# Patient Record
Sex: MALE | Race: WHITE | NOT HISPANIC OR LATINO | Employment: OTHER | URBAN - METROPOLITAN AREA
[De-identification: names, ages, dates, MRNs, and addresses within clinical notes are randomized per-mention and may not be internally consistent; named-entity substitution may affect disease eponyms.]

---

## 2018-04-05 ENCOUNTER — OFFICE VISIT (OUTPATIENT)
Dept: CARDIOLOGY | Facility: CLINIC | Age: 63
End: 2018-04-05

## 2018-04-05 VITALS
DIASTOLIC BLOOD PRESSURE: 81 MMHG | BODY MASS INDEX: 45.64 KG/M2 | HEART RATE: 73 BPM | SYSTOLIC BLOOD PRESSURE: 135 MMHG | HEIGHT: 66 IN | WEIGHT: 284 LBS

## 2018-04-05 DIAGNOSIS — R94.31 ABNORMAL EKG: ICD-10-CM

## 2018-04-05 DIAGNOSIS — I25.110 CORONARY ARTERY DISEASE INVOLVING NATIVE HEART WITH UNSTABLE ANGINA PECTORIS, UNSPECIFIED VESSEL OR LESION TYPE (HCC): ICD-10-CM

## 2018-04-05 DIAGNOSIS — R06.02 SOB (SHORTNESS OF BREATH): ICD-10-CM

## 2018-04-05 DIAGNOSIS — E66.09 CLASS 1 OBESITY DUE TO EXCESS CALORIES WITHOUT SERIOUS COMORBIDITY WITH BODY MASS INDEX (BMI) OF 30.0 TO 30.9 IN ADULT: Primary | ICD-10-CM

## 2018-04-05 PROCEDURE — 99203 OFFICE O/P NEW LOW 30 MIN: CPT | Performed by: INTERNAL MEDICINE

## 2018-04-05 PROCEDURE — 93000 ELECTROCARDIOGRAM COMPLETE: CPT | Performed by: INTERNAL MEDICINE

## 2018-04-05 RX ORDER — MONTELUKAST SODIUM 10 MG/1
10 TABLET ORAL NIGHTLY
COMMUNITY
Start: 2018-02-08

## 2018-04-05 RX ORDER — DULOXETIN HYDROCHLORIDE 60 MG/1
60 CAPSULE, DELAYED RELEASE ORAL 2 TIMES DAILY
COMMUNITY

## 2018-04-05 RX ORDER — DICLOFENAC SODIUM 75 MG/1
75 TABLET, DELAYED RELEASE ORAL 2 TIMES DAILY
COMMUNITY
Start: 2014-09-22 | End: 2020-02-29 | Stop reason: HOSPADM

## 2018-04-05 RX ORDER — HYDROCODONE BITARTRATE AND ACETAMINOPHEN 10; 325 MG/1; MG/1
1 TABLET ORAL EVERY 6 HOURS PRN
COMMUNITY

## 2018-04-05 RX ORDER — GABAPENTIN 300 MG/1
300 CAPSULE ORAL 3 TIMES DAILY
COMMUNITY
Start: 2018-03-14

## 2018-04-05 RX ORDER — OMEPRAZOLE 40 MG/1
40 CAPSULE, DELAYED RELEASE ORAL 2 TIMES DAILY
COMMUNITY
Start: 2017-11-26

## 2018-04-05 RX ORDER — ALFUZOSIN HYDROCHLORIDE 10 MG/1
10 TABLET, EXTENDED RELEASE ORAL DAILY
COMMUNITY
Start: 2018-03-27

## 2018-04-05 RX ORDER — LAMOTRIGINE 200 MG/1
200 TABLET ORAL DAILY
COMMUNITY
Start: 2018-03-27

## 2018-04-05 NOTE — PROGRESS NOTES
" Subjective:        CC  CP WITH DEEP BREATHING    SOB ON MINIMUM EXCERTION         Armando Dixon Jr. is a 62 y.o. male who Is here for the cardiac evaluation as the patient has been complaining of chest pains mild to moderate in intensity worse with a deep breathing     Patient also complaining of shortness of breath on minimum exertion     Armando Dixon Jr. has been complaining of the shortness of breath mild-to-moderate in intensity with mild-to-moderate usually relieved with rest associated with anxiety and fatigue  Cardiac risk factors: advanced age (older than 55 for men, 65 for women), male gender and obesity (BMI >= 30 kg/m2).    History reviewed. No pertinent past medical history. reports that he has never smoked. He has never used smokeless tobacco. He reports that he does not drink alcohol or use drugs.  Family History   Problem Relation Age of Onset   • Hypertension Mother    • Arrhythmia Mother    • Hyperlipidemia Neg Hx    • Heart disease Neg Hx    • Heart failure Neg Hx    • Heart attack Neg Hx         Review of Systems  Constitutional: No wt loss, fever   Gastrointestinal: No nausea , abdominal pain  Behavioral/Psych: No insomnia or anxiety   Cardiovascular ----positive for Chest pain and shortness of breath. All other systems reviewed and are negative    Objective:       Physical Exam             Physical Exam  /81   Pulse 73   Ht 167.6 cm (66\")   Wt 129 kg (284 lb)   BMI 45.84 kg/m²     General appearance: NAD, conversant   Eyes: anicteric sclerae, moist conjunctivae; no lid-lag; PERRLA   HENT: Atraumatic; oropharynx clear with moist mucous membranes and no mucosal ulcerations;  normal hard and soft palate   Neck: Trachea midline; FROM, supple, no thyromegaly or lymphadenopathy   Lungs: CTA, with normal respiratory effort and no intercostal retractions   CV: S1-S2 regular, no murmurs, no rub, no gallop   Abdomen: Soft, non-tender; no masses or HSM   Extremities: No peripheral edema " or extremity lymphadenopathy  Skin: Normal temperature, turgor and texture; no rash, ulcers or subcutaneous nodules   Psych: Appropriate affect, alert and oriented to person, place and time           Cardiographics  @  ECG 12 Lead  Date/Time: 4/5/2018 12:37 PM  Performed by: SABIHA BALDWIN  Authorized by: SABIHA BALDWIN   Comparison: compared with previous ECG   Similar to previous ECG  Rhythm: sinus rhythm  ST Flattening: all  Clinical impression: non-specific ECG        8/14  IMPRESSION:  1.  Normal left ventricular systolic function.   2.  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  His symptoms are more likely to be gastrointestinal.  I would not like   to stent this vessel until gastrointestinal causes are ruled out.    Echocardiogram:     Imaging  Chest x-ray: not done     Lab Review   not applicable       Current Outpatient Prescriptions:   •  diclofenac (VOLTAREN) 75 MG EC tablet, Take  by mouth., Disp: , Rfl:   •  omeprazole (priLOSEC) 40 MG capsule, Take 40 mg by mouth., Disp: , Rfl:   •  alfuzosin (UROXATRAL) 10 MG 24 hr tablet, , Disp: , Rfl:   •  DULoxetine (CYMBALTA) 60 MG capsule, Take 60 mg by mouth., Disp: , Rfl:   •  gabapentin (NEURONTIN) 300 MG capsule, , Disp: , Rfl:   •  HYDROcodone-acetaminophen (NORCO)  MG per tablet, Take 1 tablet by mouth., Disp: , Rfl:   •  lamoTRIgine (LaMICtal) 200 MG tablet, , Disp: , Rfl:   •  montelukast (SINGULAIR) 10 MG tablet, , Disp: , Rfl:         Assessment:      1. Class 1 obesity due to excess calories without serious comorbidity with body mass index (BMI) of 30.0 to 30.9 in adult    2. Coronary artery disease involving native heart with unstable angina pectoris, unspecified vessel or lesion type    3. SOB (shortness of breath)    4. Abnormal EKG        There is no problem list on file for this patient.        Plan:          1. Class  1 obesity due to excess calories without serious comorbidity with body mass index (BMI) of 30.0 to 30.9 in adult  Significant risk of obesity to CAD, HTN has been explained  Advantages of wt reduction has been explained    - Stress Test With Myocardial Perfusion One Day  - Adult Transthoracic Echo Complete W/ Cont if Necessary Per Protocol    2. Coronary artery disease involving native heart with unstable angina pectoris, unspecified vessel or lesion type  Armando PRINCESS Dixon Jr. with coronary artery disease has no angina pectoris    Risk reduction for the coronary artery disease, controlling the blood pressure, blood sugar management, cholesterol management, exercise, stress management, and proper compliance with medications and follow-up has been discussed    - Stress Test With Myocardial Perfusion One Day  - Adult Transthoracic Echo Complete W/ Cont if Necessary Per Protocol    3. SOB (shortness of breath)  Considering the patient's symptoms as well as clinical situation and  EKG findings, along with cardiac risk factors, ischemic workup is necessary to rule out ischemic cardiomyopathy, stress induced arrhythmias, and functional capacity for diagnosis as well as prognostic consideration    - Stress Test With Myocardial Perfusion One Day  - Adult Transthoracic Echo Complete W/ Cont if Necessary Per Protocol    4. Abnormal EKG  Considering patient's medical condition as well as the risk factors, patient will require echocardiogram for further evaluation for the LV function, four-chamber evaluation, including the pressures, valvular function and  pericardial disease and pericardial effusion    - Stress Test With Myocardial Perfusion One Day  - Adult Transthoracic Echo Complete W/ Cont if Necessary Per Protocol      Pros and cons of ASA in primary and secondary prevention of CAD has been discussed.  Risks of bleeding and other possible side effects have been discussed, Diff advantages and disadvantages of 325 vs 81  Mg  of ASA were discussed, at this stage it has been recommended to start ASA 81 mg /day       WALKING BELLE, ECHO    SEE US 2-4 WKS        Counseling was given to Armando Dixon Jr. for the following topics:  risk factor reductions, prognosis and risks and benefits of treatment options .       SMOKING COUNSELING:    Counseling given: Not Answered      .  EMR Dragon/Transcription disclaimer:   Much of this encounter note is an electronic transcription/translation of spoken language to printed text. The electronic translation of spoken language may permit erroneous, or at times, nonsensical words or phrases to be inadvertently transcribed; Although I have reviewed the note for such errors, some may still exist.

## 2018-04-30 ENCOUNTER — HOSPITAL ENCOUNTER (OUTPATIENT)
Dept: CARDIOLOGY | Facility: HOSPITAL | Age: 63
Discharge: HOME | End: 2018-04-30
Attending: INTERNAL MEDICINE | Admitting: INTERNAL MEDICINE

## 2018-04-30 ENCOUNTER — HOSPITAL ENCOUNTER (OUTPATIENT)
Dept: CARDIOLOGY | Facility: HOSPITAL | Age: 63
Discharge: HOME | End: 2018-04-30
Attending: INTERNAL MEDICINE

## 2018-04-30 ENCOUNTER — TELEPHONE (OUTPATIENT)
Dept: CARDIOLOGY | Facility: HOSPITAL | Age: 63
End: 2018-04-30

## 2018-04-30 VITALS
HEART RATE: 60 BPM | DIASTOLIC BLOOD PRESSURE: 81 MMHG | RESPIRATION RATE: 20 BRPM | HEIGHT: 66 IN | WEIGHT: 290 LBS | SYSTOLIC BLOOD PRESSURE: 159 MMHG | OXYGEN SATURATION: 96 % | BODY MASS INDEX: 46.61 KG/M2

## 2018-04-30 LAB
AORTIC DIMENSIONLESS INDEX: 0.8 (DI)
BH CV ECHO MEAS - ACS: 2.3 CM
BH CV ECHO MEAS - AO MAX PG (FULL): 5.5 MMHG
BH CV ECHO MEAS - AO MAX PG: 12.7 MMHG
BH CV ECHO MEAS - AO MEAN PG (FULL): 2 MMHG
BH CV ECHO MEAS - AO MEAN PG: 6 MMHG
BH CV ECHO MEAS - AO ROOT AREA (BSA CORRECTED): 1.5
BH CV ECHO MEAS - AO ROOT AREA: 9.1 CM^2
BH CV ECHO MEAS - AO ROOT DIAM: 3.4 CM
BH CV ECHO MEAS - AO V2 MAX: 178 CM/SEC
BH CV ECHO MEAS - AO V2 MEAN: 116 CM/SEC
BH CV ECHO MEAS - AO V2 VTI: 36.2 CM
BH CV ECHO MEAS - ASC AORTA: 2.8 CM
BH CV ECHO MEAS - AVA(I,A): 3.8 CM^2
BH CV ECHO MEAS - AVA(I,D): 3.8 CM^2
BH CV ECHO MEAS - AVA(V,A): 3.7 CM^2
BH CV ECHO MEAS - AVA(V,D): 3.7 CM^2
BH CV ECHO MEAS - BSA(HAYCOCK): 2.5 M^2
BH CV ECHO MEAS - BSA: 2.3 M^2
BH CV ECHO MEAS - BZI_BMI: 45.8 KILOGRAMS/M^2
BH CV ECHO MEAS - BZI_METRIC_HEIGHT: 167.6 CM
BH CV ECHO MEAS - BZI_METRIC_WEIGHT: 128.8 KG
BH CV ECHO MEAS - CONTRAST EF (2CH): 62.5 ML/M^2
BH CV ECHO MEAS - CONTRAST EF 4CH: 58.1 ML/M^2
BH CV ECHO MEAS - EDV(CUBED): 91.1 ML
BH CV ECHO MEAS - EDV(MOD-SP2): 192 ML
BH CV ECHO MEAS - EDV(MOD-SP4): 179 ML
BH CV ECHO MEAS - EDV(TEICH): 92.4 ML
BH CV ECHO MEAS - EF(CUBED): 64 %
BH CV ECHO MEAS - EF(MOD-BP): 61 %
BH CV ECHO MEAS - EF(MOD-SP2): 62 %
BH CV ECHO MEAS - EF(MOD-SP4): 58.1 %
BH CV ECHO MEAS - EF(TEICH): 55.7 %
BH CV ECHO MEAS - ESV(CUBED): 32.8 ML
BH CV ECHO MEAS - ESV(MOD-SP2): 72 ML
BH CV ECHO MEAS - ESV(MOD-SP4): 75 ML
BH CV ECHO MEAS - ESV(TEICH): 41 ML
BH CV ECHO MEAS - FS: 28.9 %
BH CV ECHO MEAS - IVS/LVPW: 1
BH CV ECHO MEAS - IVSD: 1.3 CM
BH CV ECHO MEAS - LAT PEAK E' VEL: 11 CM/SEC
BH CV ECHO MEAS - LV DIASTOLIC VOL/BSA (35-75): 77.1 ML/M^2
BH CV ECHO MEAS - LV MASS(C)D: 222.6 GRAMS
BH CV ECHO MEAS - LV MASS(C)DI: 95.9 GRAMS/M^2
BH CV ECHO MEAS - LV MAX PG: 7.2 MMHG
BH CV ECHO MEAS - LV MEAN PG: 4 MMHG
BH CV ECHO MEAS - LV SYSTOLIC VOL/BSA (12-30): 32.3 ML/M^2
BH CV ECHO MEAS - LV V1 MAX: 134 CM/SEC
BH CV ECHO MEAS - LV V1 MEAN: 87.4 CM/SEC
BH CV ECHO MEAS - LV V1 VTI: 28 CM
BH CV ECHO MEAS - LVIDD: 4.5 CM
BH CV ECHO MEAS - LVIDS: 3.2 CM
BH CV ECHO MEAS - LVLD AP2: 9.2 CM
BH CV ECHO MEAS - LVLD AP4: 9.4 CM
BH CV ECHO MEAS - LVLS AP2: 7.8 CM
BH CV ECHO MEAS - LVLS AP4: 8.1 CM
BH CV ECHO MEAS - LVOT AREA (M): 4.9 CM^2
BH CV ECHO MEAS - LVOT AREA: 4.9 CM^2
BH CV ECHO MEAS - LVOT DIAM: 2.5 CM
BH CV ECHO MEAS - LVPWD: 1.3 CM
BH CV ECHO MEAS - MED PEAK E' VEL: 10 CM/SEC
BH CV ECHO MEAS - MV A DUR: 0.13 SEC
BH CV ECHO MEAS - MV A MAX VEL: 85.9 CM/SEC
BH CV ECHO MEAS - MV DEC SLOPE: 503 CM/SEC^2
BH CV ECHO MEAS - MV DEC TIME: 0.15 SEC
BH CV ECHO MEAS - MV E MAX VEL: 90.7 CM/SEC
BH CV ECHO MEAS - MV E/A: 1.1
BH CV ECHO MEAS - MV MAX PG: 7.4 MMHG
BH CV ECHO MEAS - MV MEAN PG: 3 MMHG
BH CV ECHO MEAS - MV P1/2T MAX VEL: 128 CM/SEC
BH CV ECHO MEAS - MV P1/2T: 74.5 MSEC
BH CV ECHO MEAS - MV V2 MAX: 136 CM/SEC
BH CV ECHO MEAS - MV V2 MEAN: 80 CM/SEC
BH CV ECHO MEAS - MV V2 VTI: 36.3 CM
BH CV ECHO MEAS - MVA P1/2T LCG: 1.7 CM^2
BH CV ECHO MEAS - MVA(P1/2T): 3 CM^2
BH CV ECHO MEAS - MVA(VTI): 3.8 CM^2
BH CV ECHO MEAS - PA ACC TIME: 0.11 SEC
BH CV ECHO MEAS - PA MAX PG (FULL): 2.3 MMHG
BH CV ECHO MEAS - PA MAX PG: 4.4 MMHG
BH CV ECHO MEAS - PA PR(ACCEL): 29.1 MMHG
BH CV ECHO MEAS - PA V2 MAX: 105 CM/SEC
BH CV ECHO MEAS - PULM A REVS DUR: 0.12 SEC
BH CV ECHO MEAS - PULM A REVS VEL: 29.1 CM/SEC
BH CV ECHO MEAS - PULM DIAS VEL: 38.1 CM/SEC
BH CV ECHO MEAS - PULM S/D: 1.9
BH CV ECHO MEAS - PULM SYS VEL: 73.3 CM/SEC
BH CV ECHO MEAS - PVA(V,A): 2.2 CM^2
BH CV ECHO MEAS - PVA(V,D): 2.2 CM^2
BH CV ECHO MEAS - QP/QS: 0.4
BH CV ECHO MEAS - RV MAX PG: 2.1 MMHG
BH CV ECHO MEAS - RV MEAN PG: 1 MMHG
BH CV ECHO MEAS - RV V1 MAX: 72.8 CM/SEC
BH CV ECHO MEAS - RV V1 MEAN: 55.3 CM/SEC
BH CV ECHO MEAS - RV V1 VTI: 17.4 CM
BH CV ECHO MEAS - RVOT AREA: 3.1 CM^2
BH CV ECHO MEAS - RVOT DIAM: 2 CM
BH CV ECHO MEAS - SI(AO): 141.6 ML/M^2
BH CV ECHO MEAS - SI(CUBED): 25.1 ML/M^2
BH CV ECHO MEAS - SI(LVOT): 59.2 ML/M^2
BH CV ECHO MEAS - SI(MOD-SP2): 51.7 ML/M^2
BH CV ECHO MEAS - SI(MOD-SP4): 44.8 ML/M^2
BH CV ECHO MEAS - SI(TEICH): 22.2 ML/M^2
BH CV ECHO MEAS - SV(AO): 328.7 ML
BH CV ECHO MEAS - SV(CUBED): 58.4 ML
BH CV ECHO MEAS - SV(LVOT): 137.4 ML
BH CV ECHO MEAS - SV(MOD-SP2): 120 ML
BH CV ECHO MEAS - SV(MOD-SP4): 104 ML
BH CV ECHO MEAS - SV(RVOT): 54.7 ML
BH CV ECHO MEAS - SV(TEICH): 51.5 ML
BH CV ECHO MEAS - TAPSE (>1.6): 3.2 CM2
BH CV ECHO MEASUREMENTS AVERAGE E/E' RATIO: 8.64
BH CV STRESS BP STAGE 1: NORMAL
BH CV STRESS COMMENTS STAGE 1: NORMAL
BH CV STRESS DOSE REGADENOSON STAGE 1: 0.4
BH CV STRESS DURATION MIN STAGE 1: 2
BH CV STRESS DURATION SEC STAGE 1: 37
BH CV STRESS GRADE STAGE 1: 0
BH CV STRESS HR STAGE 1: 111
BH CV STRESS METS STAGE 1: 1.4
BH CV STRESS PROTOCOL 1: NORMAL
BH CV STRESS RECOVERY BP: NORMAL MMHG
BH CV STRESS RECOVERY HR: 75 BPM
BH CV STRESS RECOVERY O2: 96 %
BH CV STRESS SPEED STAGE 1: 1
BH CV STRESS STAGE 1: 1
BH CV VAS BP LEFT ARM: NORMAL MMHG
BH CV XLRA - RV BASE: 3.7 CM
BH CV XLRA - TDI S': 16 CM/SEC
LEFT ATRIUM VOLUME INDEX: 30.8 ML/M2
LV EF NUC BP: 60 %
MAXIMAL PREDICTED HEART RATE: 159 BPM
MAXIMAL PREDICTED HEART RATE: 159 BPM
PERCENT MAX PREDICTED HR: 69.81 %
STRESS BASELINE BP: NORMAL MMHG
STRESS BASELINE HR: 62 BPM
STRESS O2 SAT REST: 96 %
STRESS PERCENT HR: 82 %
STRESS POST ESTIMATED WORKLOAD: 1.4 METS
STRESS POST EXERCISE DUR MIN: 2 MIN
STRESS POST EXERCISE DUR SEC: 37 SEC
STRESS POST PEAK BP: NORMAL MMHG
STRESS POST PEAK HR: 111 BPM
STRESS TARGET HR: 135 BPM
STRESS TARGET HR: 135 BPM

## 2018-04-30 PROCEDURE — 93306 TTE W/DOPPLER COMPLETE: CPT

## 2018-04-30 PROCEDURE — 93017 CV STRESS TEST TRACING ONLY: CPT

## 2018-04-30 PROCEDURE — A9500 TC99M SESTAMIBI: HCPCS | Performed by: INTERNAL MEDICINE

## 2018-04-30 PROCEDURE — 93306 TTE W/DOPPLER COMPLETE: CPT | Performed by: INTERNAL MEDICINE

## 2018-04-30 PROCEDURE — 25010000002 PERFLUTREN (DEFINITY) 8.476 MG IN SODIUM CHLORIDE 0.9 % 10 ML INJECTION: Performed by: INTERNAL MEDICINE

## 2018-04-30 PROCEDURE — 78452 HT MUSCLE IMAGE SPECT MULT: CPT | Performed by: INTERNAL MEDICINE

## 2018-04-30 PROCEDURE — 93016 CV STRESS TEST SUPVJ ONLY: CPT | Performed by: INTERNAL MEDICINE

## 2018-04-30 PROCEDURE — 93018 CV STRESS TEST I&R ONLY: CPT | Performed by: INTERNAL MEDICINE

## 2018-04-30 PROCEDURE — 25010000002 REGADENOSON 0.4 MG/5ML SOLUTION: Performed by: INTERNAL MEDICINE

## 2018-04-30 PROCEDURE — 0 TECHNETIUM SESTAMIBI: Performed by: INTERNAL MEDICINE

## 2018-04-30 PROCEDURE — 78452 HT MUSCLE IMAGE SPECT MULT: CPT

## 2018-04-30 RX ADMIN — REGADENOSON 0.4 MG: 0.08 INJECTION, SOLUTION INTRAVENOUS at 09:10

## 2018-04-30 RX ADMIN — TECHNETIUM TC 99M SESTAMIBI 1 DOSE: 1 INJECTION INTRAVENOUS at 07:23

## 2018-04-30 RX ADMIN — TECHNETIUM TC 99M SESTAMIBI 1 DOSE: 1 INJECTION INTRAVENOUS at 09:10

## 2018-04-30 RX ADMIN — SODIUM CHLORIDE 7 ML: 9 INJECTION INTRAMUSCULAR; INTRAVENOUS; SUBCUTANEOUS at 07:49

## 2018-06-07 ENCOUNTER — OFFICE VISIT (OUTPATIENT)
Dept: CARDIOLOGY | Facility: CLINIC | Age: 63
End: 2018-06-07

## 2018-06-07 ENCOUNTER — TELEPHONE (OUTPATIENT)
Dept: CARDIOLOGY | Facility: CLINIC | Age: 63
End: 2018-06-07

## 2018-06-07 VITALS
DIASTOLIC BLOOD PRESSURE: 84 MMHG | HEIGHT: 66 IN | HEART RATE: 71 BPM | BODY MASS INDEX: 47.09 KG/M2 | SYSTOLIC BLOOD PRESSURE: 164 MMHG | WEIGHT: 293 LBS

## 2018-06-07 DIAGNOSIS — Z53.21 PATIENT LEFT WITHOUT BEING SEEN: Primary | ICD-10-CM

## 2018-06-07 RX ORDER — TRIAMTERENE AND HYDROCHLOROTHIAZIDE 37.5; 25 MG/1; MG/1
1 CAPSULE ORAL EVERY MORNING
Qty: 30 CAPSULE | Refills: 0 | Status: SHIPPED | OUTPATIENT
Start: 2018-06-07 | End: 2018-07-09 | Stop reason: SDUPTHER

## 2018-06-07 NOTE — TELEPHONE ENCOUNTER
Mr Dixon has been having swelling in his legs and feet. Per Dr ROJAS start on dyazide and return in one month.    Mr Dixon verbalized understanding and states will call back sooner if no improvement.

## 2018-07-05 ENCOUNTER — OFFICE VISIT (OUTPATIENT)
Dept: CARDIOLOGY | Facility: CLINIC | Age: 63
End: 2018-07-05

## 2018-07-05 VITALS
SYSTOLIC BLOOD PRESSURE: 127 MMHG | DIASTOLIC BLOOD PRESSURE: 73 MMHG | WEIGHT: 284 LBS | HEART RATE: 77 BPM | BODY MASS INDEX: 45.64 KG/M2 | HEIGHT: 66 IN

## 2018-07-05 DIAGNOSIS — E66.09 CLASS 1 OBESITY DUE TO EXCESS CALORIES WITHOUT SERIOUS COMORBIDITY WITH BODY MASS INDEX (BMI) OF 30.0 TO 30.9 IN ADULT: ICD-10-CM

## 2018-07-05 DIAGNOSIS — I10 ESSENTIAL HYPERTENSION: Primary | ICD-10-CM

## 2018-07-05 PROBLEM — E66.811 CLASS 1 OBESITY DUE TO EXCESS CALORIES WITHOUT SERIOUS COMORBIDITY WITH BODY MASS INDEX (BMI) OF 30.0 TO 30.9 IN ADULT: Status: ACTIVE | Noted: 2018-07-05

## 2018-07-05 PROCEDURE — 99213 OFFICE O/P EST LOW 20 MIN: CPT | Performed by: INTERNAL MEDICINE

## 2018-07-05 NOTE — PROGRESS NOTES
" Subjective:       Armando Dixon Jr. is a 63 y.o. male who here for follow up    CC  Follow-up for the hypertension and obesity   HPI  63-year-old male with known history of the benign essential arterial hypertension and obesity here for the follow-up after the stress test and echocardiogram which are normal     Problem List Items Addressed This Visit        Cardiovascular and Mediastinum    Essential hypertension - Primary       Digestive    Class 1 obesity due to excess calories without serious comorbidity with body mass index (BMI) of 30.0 to 30.9 in adult        .    The following portions of the patient's history were reviewed and updated as appropriate: allergies, current medications, past family history, past medical history, past social history, past surgical history and problem list.    Past Medical History:   Diagnosis Date   • Chronic kidney disease     chronic stones  has had 4 eposodes   • Hypertension    • ENRRIQUE on CPAP    • Seasonal allergies     reports that he has never smoked. He has never used smokeless tobacco. He reports that he does not drink alcohol or use drugs.  Family History   Problem Relation Age of Onset   • Hypertension Mother    • Arrhythmia Mother    • Hyperlipidemia Neg Hx    • Heart disease Neg Hx    • Heart failure Neg Hx    • Heart attack Neg Hx        Review of Systems  Constitutional: No wt loss, fever, fatigue  Gastrointestinal: No nausea, abdominal pain  Behavioral/Psych: No insomnia or anxiety   Cardiovascular No chest pains or tightness in chest  Objective:               Physical Exam  /73 (BP Location: Left arm, Patient Position: Sitting)   Pulse 77   Ht 167.6 cm (66\")   Wt 129 kg (284 lb)   BMI 45.84 kg/m²     General appearance: NAD, conversant   Eyes: anicteric sclerae, moist conjunctivae; no lid-lag; PERRLA   HENT: Atraumatic; oropharynx clear with moist mucous membranes and no mucosal ulcerations;  normal hard and soft palate   Neck: Trachea midline; FROM, " supple, no thyromegaly or lymphadenopathy   Lungs: CTA, with normal respiratory effort and no intercostal retractions   CV: S1-S2 regular, no murmurs, no rub, no gallop   Abdomen: Soft, non-tender; no masses or HSM   Extremities: No peripheral edema or extremity lymphadenopathy  Skin: Normal temperature, turgor and texture; no rash, ulcers or subcutaneous nodules   Psych: Appropriate affect, alert and oriented to person, place and time           Cardiographics  @Procedures    Echocardiogram:        Current Outpatient Prescriptions:   •  alfuzosin (UROXATRAL) 10 MG 24 hr tablet, , Disp: , Rfl:   •  diclofenac (VOLTAREN) 75 MG EC tablet, Take  by mouth., Disp: , Rfl:   •  DULoxetine (CYMBALTA) 60 MG capsule, Take 60 mg by mouth., Disp: , Rfl:   •  gabapentin (NEURONTIN) 300 MG capsule, , Disp: , Rfl:   •  HYDROcodone-acetaminophen (NORCO)  MG per tablet, Take 1 tablet by mouth., Disp: , Rfl:   •  lamoTRIgine (LaMICtal) 200 MG tablet, , Disp: , Rfl:   •  montelukast (SINGULAIR) 10 MG tablet, , Disp: , Rfl:   •  omeprazole (priLOSEC) 40 MG capsule, Take 40 mg by mouth., Disp: , Rfl:   •  triamterene-hydrochlorothiazide (DYAZIDE) 37.5-25 MG per capsule, Take 1 capsule by mouth Every Morning., Disp: 30 capsule, Rfl: 0   Assessment:        There is no problem list on file for this patient.        Interpretation Summary        · Findings consistent with an equivocal ECG stress test.  · Left ventricular ejection fraction is normal (Calculated EF = 60%).  · Myocardial perfusion imaging indicates a normal myocardial perfusion study with no evidence of ischemia.  · Impressions are consistent with a low risk study     Interpretation Summary     · Left atrial cavity size is mildly dilated.  · Calculated EF = 61%.  · There is no evidence of pericardial effusion.              Plan:            ICD-10-CM ICD-9-CM   1. Essential hypertension I10 401.9   2. Class 1 obesity due to excess calories without serious comorbidity with  body mass index (BMI) of 30.0 to 30.9 in adult E66.09 278.00    Z68.30 V85.30     1. Essential hypertension  Blood pressure under control    2. Class 1 obesity due to excess calories without serious comorbidity with body mass index (BMI) of 30.0 to 30.9 in adult  Significant risk of obesity to CAD, HTN has been explained  Advantages of wt reduction has been explained         Specificity and sensitivity of the stress test/ cardiac workup has been explained. Pt has been explained if  Symptoms continue please go to ER, and further w/p will be required.    Also explained this does not rule out coronary artery disease or the future events, continue to emphasize on risk reductions for coronary artery disease    bp better with dyazide  See in 1 yr  COUNSELING:    Armando Dixon Jr.Counseling was given to patient for the following topics: diagnostic results, risk factor reductions, impressions, risks and benefits of treatment options and importance of treatment compliance .       SMOKING COUNSELING:    Counseling given: Not Answered      EMR Dragon/Transcription disclaimer:   Much of this encounter note is an electronic transcription/translation of spoken language to printed text. The electronic translation of spoken language may permit erroneous, or at times, nonsensical words or phrases to be inadvertently transcribed; Although I have reviewed the note for such errors, some may still exist.

## 2018-07-10 RX ORDER — TRIAMTERENE AND HYDROCHLOROTHIAZIDE 37.5; 25 MG/1; MG/1
CAPSULE ORAL
Qty: 30 CAPSULE | Refills: 1 | Status: SHIPPED | OUTPATIENT
Start: 2018-07-10 | End: 2018-09-17 | Stop reason: SDUPTHER

## 2018-09-17 RX ORDER — TRIAMTERENE AND HYDROCHLOROTHIAZIDE 37.5; 25 MG/1; MG/1
CAPSULE ORAL
Qty: 30 CAPSULE | Refills: 0 | Status: SHIPPED | OUTPATIENT
Start: 2018-09-17 | End: 2018-10-22 | Stop reason: SDUPTHER

## 2018-10-23 RX ORDER — TRIAMTERENE AND HYDROCHLOROTHIAZIDE 37.5; 25 MG/1; MG/1
CAPSULE ORAL
Qty: 30 CAPSULE | Refills: 3 | Status: SHIPPED | OUTPATIENT
Start: 2018-10-23 | End: 2019-04-08 | Stop reason: SDUPTHER

## 2019-04-08 RX ORDER — TRIAMTERENE AND HYDROCHLOROTHIAZIDE 37.5; 25 MG/1; MG/1
CAPSULE ORAL
Qty: 30 CAPSULE | Refills: 2 | Status: SHIPPED | OUTPATIENT
Start: 2019-04-08 | End: 2019-07-26 | Stop reason: SDUPTHER

## 2019-07-26 RX ORDER — TRIAMTERENE AND HYDROCHLOROTHIAZIDE 37.5; 25 MG/1; MG/1
CAPSULE ORAL
Qty: 30 CAPSULE | Refills: 0 | Status: SHIPPED | OUTPATIENT
Start: 2019-07-26 | End: 2019-08-27 | Stop reason: SDUPTHER

## 2019-08-28 RX ORDER — TRIAMTERENE AND HYDROCHLOROTHIAZIDE 37.5; 25 MG/1; MG/1
CAPSULE ORAL
Qty: 30 CAPSULE | Refills: 0 | Status: SHIPPED | OUTPATIENT
Start: 2019-08-28 | End: 2019-10-15 | Stop reason: SDUPTHER

## 2019-10-15 RX ORDER — TRIAMTERENE AND HYDROCHLOROTHIAZIDE 37.5; 25 MG/1; MG/1
CAPSULE ORAL
Qty: 30 CAPSULE | Refills: 0 | Status: SHIPPED | OUTPATIENT
Start: 2019-10-15 | End: 2019-12-10 | Stop reason: SDUPTHER

## 2019-11-20 RX ORDER — TRIAMTERENE AND HYDROCHLOROTHIAZIDE 37.5; 25 MG/1; MG/1
CAPSULE ORAL
Qty: 30 CAPSULE | Refills: 0 | OUTPATIENT
Start: 2019-11-20

## 2019-12-09 RX ORDER — TRIAMTERENE AND HYDROCHLOROTHIAZIDE 37.5; 25 MG/1; MG/1
CAPSULE ORAL
Qty: 30 CAPSULE | Refills: 0 | OUTPATIENT
Start: 2019-12-09

## 2019-12-10 RX ORDER — TRIAMTERENE AND HYDROCHLOROTHIAZIDE 37.5; 25 MG/1; MG/1
1 CAPSULE ORAL EVERY MORNING
Qty: 30 CAPSULE | Refills: 1 | Status: SHIPPED | OUTPATIENT
Start: 2019-12-10 | End: 2020-02-26

## 2019-12-24 ENCOUNTER — OFFICE VISIT (OUTPATIENT)
Dept: CARDIOLOGY | Facility: CLINIC | Age: 64
End: 2019-12-24

## 2019-12-24 VITALS
WEIGHT: 256 LBS | HEIGHT: 66 IN | HEART RATE: 67 BPM | SYSTOLIC BLOOD PRESSURE: 135 MMHG | DIASTOLIC BLOOD PRESSURE: 79 MMHG | BODY MASS INDEX: 41.14 KG/M2

## 2019-12-24 DIAGNOSIS — E78.2 MIXED HYPERLIPIDEMIA: ICD-10-CM

## 2019-12-24 DIAGNOSIS — E66.09 CLASS 1 OBESITY DUE TO EXCESS CALORIES WITHOUT SERIOUS COMORBIDITY WITH BODY MASS INDEX (BMI) OF 30.0 TO 30.9 IN ADULT: ICD-10-CM

## 2019-12-24 DIAGNOSIS — I10 ESSENTIAL HYPERTENSION: Primary | ICD-10-CM

## 2019-12-24 PROCEDURE — 93000 ELECTROCARDIOGRAM COMPLETE: CPT | Performed by: NURSE PRACTITIONER

## 2019-12-24 PROCEDURE — 99214 OFFICE O/P EST MOD 30 MIN: CPT | Performed by: NURSE PRACTITIONER

## 2019-12-24 NOTE — PROGRESS NOTES
Patient Name: Armando Dixon Jr.  :1955  Age: 64 y.o.  Primary Cardiologist: Minerva Adams MD  Encounter Provider:  EDU Spence      Chief Complaint:   Chief Complaint   Patient presents with   • Hypertension     1 YR FOLLOW UP         HPI this 64-year-old male, new to this provider, comes today in annual follow-up with current diagnoses to include hypertension, obesity with BMI of 45.8, ENRRIQUE with CPAP therapy, and chronic kidney disease.  Last echo 2018 revealed mild elation of LAC, EF 61% with no evidence of pericardial effusion.  Stress testing done at the same time revealed no evidence of myocardial ischemia.  Prior cardiac catheterization done 2014 revealed moderate stenosis in the proximal part of diagonal artery and symptomatology at that time were thought to be GI in nature.  Last lipid panel 2019 revealed triglycerides 168  HDL 53 and LDL 94 with AST and ALT within normal limits at that time.  ECG in office today reveals sinus rhythm, heart rate 60s.    Patient Active Problem List   Diagnosis   • Essential hypertension   • Class 1 obesity due to excess calories without serious comorbidity with body mass index (BMI) of 30.0 to 30.9 in adult   • Mixed hyperlipidemia           The following portions of the patient's history were reviewed and updated as appropriate: allergies, current medications, past family history, past medical history, past social history, past surgical history and problem list.    Current Outpatient Medications on File Prior to Visit   Medication Sig Dispense Refill   • alfuzosin (UROXATRAL) 10 MG 24 hr tablet      • diclofenac (VOLTAREN) 75 MG EC tablet Take  by mouth.     • DULoxetine (CYMBALTA) 60 MG capsule Take 60 mg by mouth.     • gabapentin (NEURONTIN) 300 MG capsule      • HYDROcodone-acetaminophen (NORCO)  MG per tablet Take 1 tablet by mouth.     • lamoTRIgine (LaMICtal) 200 MG tablet      • montelukast  (SINGULAIR) 10 MG tablet      • omeprazole (priLOSEC) 40 MG capsule Take 40 mg by mouth.     • triamterene-hydrochlorothiazide (DYAZIDE) 37.5-25 MG per capsule Take 1 capsule by mouth Every Morning. 30 capsule 1     No current facility-administered medications on file prior to visit.        Past Medical History:   Diagnosis Date   • Chronic kidney disease     chronic stones  has had 4 eposodes   • Hypertension    • ENRRIQUE on CPAP    • Seasonal allergies        Past Surgical History:   Procedure Laterality Date   • CARPAL TUNNEL RELEASE Bilateral    • CHOLECYSTECTOMY     • COLONOSCOPY  10/06/2014    [facility]   • ENDOSCOPY  08/29/2014    Severe esophagitis, gastritis, neg for H pylori, Walters's, and fungi   • GASTRIC BYPASS     • TOTAL HIP ARTHROPLASTY         Family History   Problem Relation Age of Onset   • Hypertension Mother    • Arrhythmia Mother    • Hyperlipidemia Neg Hx    • Heart disease Neg Hx    • Heart failure Neg Hx    • Heart attack Neg Hx        Social History     Socioeconomic History   • Marital status:      Spouse name: Not on file   • Number of children: Not on file   • Years of education: Not on file   • Highest education level: Not on file   Tobacco Use   • Smoking status: Never Smoker   • Smokeless tobacco: Never Used   Substance and Sexual Activity   • Alcohol use: Yes     Comment: RARELY   • Drug use: No   • Sexual activity: Defer       Review of Systems   Constitution: Negative for diaphoresis and malaise/fatigue.   HENT: Negative for nosebleeds and stridor.    Cardiovascular: Negative for chest pain, claudication, dyspnea on exertion, irregular heartbeat, leg swelling, near-syncope, orthopnea, palpitations, paroxysmal nocturnal dyspnea and syncope.   Respiratory: Negative for cough, hemoptysis, shortness of breath and wheezing.    Gastrointestinal: Negative for abdominal pain, constipation, diarrhea, hematemesis, hematochezia, melena, nausea and vomiting.   Neurological: Negative for  "dizziness, focal weakness, light-headedness and weakness.       OBJECTIVE:   Vital Signs  Vitals:    12/24/19 1010   BP: 135/79   Pulse: 67     Estimated body mass index is 41.32 kg/m² as calculated from the following:    Height as of this encounter: 167.6 cm (66\").    Weight as of this encounter: 116 kg (256 lb).    Physical Exam   Constitutional: He is oriented to person, place, and time. He appears well-developed and well-nourished. No distress.   HENT:   Head: Normocephalic and atraumatic.   Eyes: Pupils are equal, round, and reactive to light. Conjunctivae and EOM are normal.   Neck: Normal range of motion. Neck supple. No JVD present. No tracheal deviation present. No thyromegaly present.   Cardiovascular: Normal rate, regular rhythm, normal heart sounds and intact distal pulses. Exam reveals no gallop and no friction rub.   No murmur heard.  Pulmonary/Chest: Effort normal and breath sounds normal. No respiratory distress. He has no wheezes. He has no rales. He exhibits no tenderness.   Abdominal: Soft. Bowel sounds are normal. He exhibits no distension. There is no tenderness.   Musculoskeletal: Normal range of motion. He exhibits no edema, tenderness or deformity.   Neurological: He is alert and oriented to person, place, and time.   Skin: Skin is warm and dry. No rash noted. He is not diaphoretic. No erythema. No pallor.   Psychiatric: He has a normal mood and affect. His behavior is normal.         ECG 12 Lead  Date/Time: 12/24/2019 10:25 AM  Performed by: Kimberly Vela APRN  Authorized by: Kimberly Vela APRN   Comparison: compared with previous ECG from 4/6/2018  Similar to previous ECG  Rhythm: sinus rhythm  Rate: normal  Conduction: conduction normal  ST Segments: ST segments normal  T Waves: T waves normal  QRS axis: normal    Clinical impression: normal ECG            Cardiac Cath:  8/26/14  IMPRESSION:  1.  Normal left ventricular systolic function.   2.  Moderate stenosis in the proximal " part of the diagonal branch artery  originating  at the mid-trifurcation of the mid-left anterior descending  artery.  His symptoms are more likely to be gastrointestinal.  I would not like   to stent this vessel until gastrointestinal causes are ruled out.    Stress Testin18  Interpretation Summary        · Findings consistent with an equivocal ECG stress test.  · Left ventricular ejection fraction is normal (Calculated EF = 60%).  · Myocardial perfusion imaging indicates a normal myocardial perfusion study with no evidence of ischemia.  · Impressions are consistent with a low risk study.     Asymptomatic for chest pain. Specificity of study reduced secondary to baseline Non-specific ST-T wave abnormalities, however ECG is not suggestive of ischemia  Ectopy: none.  Pharmacologic study due to inability to tolerate increasing speed and grade of treadmill due to orthopedic and  mobility  issues.  Participated in Low Level exercise and tolerance is fair.          Cardiac Echo:  18  Interpretation Summary     · Left atrial cavity size is mildly dilated.  · Calculated EF = 61%.  · There is no evidence of pericardial effusion.           ASSESSMENT:      Diagnosis Plan   1. Essential hypertension  ECG 12 Lead    Lipid Panel    Comprehensive Metabolic Panel   2. Class 1 obesity due to excess calories without serious comorbidity with body mass index (BMI) of 30.0 to 30.9 in adult     3. Mixed hyperlipidemia           PLAN OF CARE:     1.  Hypertension-blood pressure in office today is currently stable and well controlled.  Patient currently on Dyazide.  Last echo and ischemic work-up as above.  Continue current medication regimen    Importance of controlling hypertension and blood pressure  checkup on the regular basis has been explained  Hypertension as a silent killer has been discussed  Risk reduction of the weight and regular exercises to control the hypertension has been explained      2.   Hyperlipidemia-currently controlled with diet.  Continue to monitor annually.    3.  ENRRIQUE-compliant with CPAP therapy.  Continue.  Patient follows with pulmonary.  ECG in office today reveals sinus rhythm, rate normal.    4.  Obesity-BMI 45.8.  The importance of healthy diet and exercise at least 30 minutes daily reviewed with the patient in detail.  He has been following weight watchers and has lost 50 pounds.  He is encouraged to continue up this excellent work.        Follow-up in 1 year sooner if needed with FLP and CMP    Sincerely,   EDU Spence  [facility]  12/24/19  10:37 AM

## 2020-01-24 PROCEDURE — 93005 ELECTROCARDIOGRAM TRACING: CPT | Performed by: EMERGENCY MEDICINE

## 2020-01-24 PROCEDURE — 99284 EMERGENCY DEPT VISIT MOD MDM: CPT

## 2020-01-24 PROCEDURE — 93005 ELECTROCARDIOGRAM TRACING: CPT

## 2020-01-24 PROCEDURE — 93010 ELECTROCARDIOGRAM REPORT: CPT | Performed by: INTERNAL MEDICINE

## 2020-01-24 RX ORDER — ASPIRIN 325 MG
325 TABLET ORAL ONCE
Status: DISCONTINUED | OUTPATIENT
Start: 2020-01-24 | End: 2020-01-25

## 2020-01-24 RX ORDER — SODIUM CHLORIDE 0.9 % (FLUSH) 0.9 %
10 SYRINGE (ML) INJECTION AS NEEDED
Status: DISCONTINUED | OUTPATIENT
Start: 2020-01-24 | End: 2020-01-25 | Stop reason: HOSPADM

## 2020-01-25 ENCOUNTER — HOSPITAL ENCOUNTER (EMERGENCY)
Facility: HOSPITAL | Age: 65
Discharge: HOME | End: 2020-01-25
Attending: EMERGENCY MEDICINE | Admitting: EMERGENCY MEDICINE

## 2020-01-25 ENCOUNTER — APPOINTMENT (OUTPATIENT)
Dept: GENERAL RADIOLOGY | Facility: HOSPITAL | Age: 65
End: 2020-01-25

## 2020-01-25 VITALS
BODY MASS INDEX: 42.75 KG/M2 | DIASTOLIC BLOOD PRESSURE: 78 MMHG | RESPIRATION RATE: 16 BRPM | TEMPERATURE: 97.2 F | HEART RATE: 62 BPM | SYSTOLIC BLOOD PRESSURE: 137 MMHG | HEIGHT: 66 IN | WEIGHT: 266 LBS | OXYGEN SATURATION: 98 %

## 2020-01-25 DIAGNOSIS — R07.9 CHEST PAIN, UNSPECIFIED TYPE: Primary | ICD-10-CM

## 2020-01-25 LAB
ALBUMIN SERPL-MCNC: 3.9 G/DL (ref 3.5–5.2)
ALBUMIN/GLOB SERPL: 1.6 G/DL
ALP SERPL-CCNC: 104 U/L (ref 39–117)
ALT SERPL W P-5'-P-CCNC: 13 U/L (ref 1–41)
ANION GAP SERPL CALCULATED.3IONS-SCNC: 8.8 MMOL/L (ref 5–15)
AST SERPL-CCNC: 16 U/L (ref 1–40)
BASOPHILS # BLD AUTO: 0.02 10*3/MM3 (ref 0–0.2)
BASOPHILS NFR BLD AUTO: 0.4 % (ref 0–1.5)
BILIRUB SERPL-MCNC: 0.2 MG/DL (ref 0.2–1.2)
BUN BLD-MCNC: 18 MG/DL (ref 8–23)
BUN/CREAT SERPL: 19.8 (ref 7–25)
CALCIUM SPEC-SCNC: 9.1 MG/DL (ref 8.6–10.5)
CHLORIDE SERPL-SCNC: 105 MMOL/L (ref 98–107)
CO2 SERPL-SCNC: 25.2 MMOL/L (ref 22–29)
CREAT BLD-MCNC: 0.91 MG/DL (ref 0.76–1.27)
DEPRECATED RDW RBC AUTO: 40.8 FL (ref 37–54)
EOSINOPHIL # BLD AUTO: 0.13 10*3/MM3 (ref 0–0.4)
EOSINOPHIL NFR BLD AUTO: 2.4 % (ref 0.3–6.2)
ERYTHROCYTE [DISTWIDTH] IN BLOOD BY AUTOMATED COUNT: 12.9 % (ref 12.3–15.4)
GFR SERPL CREATININE-BSD FRML MDRD: 84 ML/MIN/1.73
GLOBULIN UR ELPH-MCNC: 2.5 GM/DL
GLUCOSE BLD-MCNC: 104 MG/DL (ref 65–99)
HCT VFR BLD AUTO: 39 % (ref 37.5–51)
HGB BLD-MCNC: 12.6 G/DL (ref 13–17.7)
HOLD SPECIMEN: NORMAL
HOLD SPECIMEN: NORMAL
IMM GRANULOCYTES # BLD AUTO: 0.01 10*3/MM3 (ref 0–0.05)
IMM GRANULOCYTES NFR BLD AUTO: 0.2 % (ref 0–0.5)
LYMPHOCYTES # BLD AUTO: 1.51 10*3/MM3 (ref 0.7–3.1)
LYMPHOCYTES NFR BLD AUTO: 27.7 % (ref 19.6–45.3)
MCH RBC QN AUTO: 28.3 PG (ref 26.6–33)
MCHC RBC AUTO-ENTMCNC: 32.3 G/DL (ref 31.5–35.7)
MCV RBC AUTO: 87.6 FL (ref 79–97)
MONOCYTES # BLD AUTO: 0.58 10*3/MM3 (ref 0.1–0.9)
MONOCYTES NFR BLD AUTO: 10.6 % (ref 5–12)
NEUTROPHILS # BLD AUTO: 3.21 10*3/MM3 (ref 1.7–7)
NEUTROPHILS NFR BLD AUTO: 58.7 % (ref 42.7–76)
NRBC BLD AUTO-RTO: 0 /100 WBC (ref 0–0.2)
PLATELET # BLD AUTO: 214 10*3/MM3 (ref 140–450)
PMV BLD AUTO: 9.9 FL (ref 6–12)
POTASSIUM BLD-SCNC: 4.5 MMOL/L (ref 3.5–5.2)
PROT SERPL-MCNC: 6.4 G/DL (ref 6–8.5)
RBC # BLD AUTO: 4.45 10*6/MM3 (ref 4.14–5.8)
SODIUM BLD-SCNC: 139 MMOL/L (ref 136–145)
TROPONIN T SERPL-MCNC: <0.01 NG/ML (ref 0–0.03)
WBC NRBC COR # BLD: 5.46 10*3/MM3 (ref 3.4–10.8)
WHOLE BLOOD HOLD SPECIMEN: NORMAL
WHOLE BLOOD HOLD SPECIMEN: NORMAL

## 2020-01-25 PROCEDURE — 84484 ASSAY OF TROPONIN QUANT: CPT

## 2020-01-25 PROCEDURE — 84484 ASSAY OF TROPONIN QUANT: CPT | Performed by: PHYSICIAN ASSISTANT

## 2020-01-25 PROCEDURE — 85025 COMPLETE CBC W/AUTO DIFF WBC: CPT

## 2020-01-25 PROCEDURE — 80053 COMPREHEN METABOLIC PANEL: CPT

## 2020-01-25 PROCEDURE — 71046 X-RAY EXAM CHEST 2 VIEWS: CPT

## 2020-01-25 NOTE — DISCHARGE INSTRUCTIONS
Home, rest, home medicine as prescribed, follow up with cardiologist for recheck and further evaluation. Return to care if pain changes, worsens, or with further concerns.

## 2020-01-25 NOTE — ED NOTES
Pt arrived via ems for c/o gradual onset of chest pain since 8:15, thought was cold air, but pain continued, radiating up into his neck and his left arm.  Pt 324 mg asa prior to ems arrival, given 1 SL nitro per ems with improvement of pain.      Jennifer Leon, RN  01/24/20 [id number]

## 2020-01-25 NOTE — ED NOTES
Pt provided discharge and follow up instructions at this time.  Medications reviewed.  Questions answered.  Pt verbalized understanding of all discharge information. Pt vitals stable, no obvious distress noted.     Kenya Bustos, RN  01/25/20 [id number]

## 2020-01-25 NOTE — ED PROVIDER NOTES
" EMERGENCY DEPARTMENT ENCOUNTER    CHIEF COMPLAINT  Chief Complaint: chest pain  History given by: patient  History limited by: none  Room Number: 17/17  PMD: Bonifacio Dodd MD  Cardiologist: Dr. Adams    HPI:  Pt is a 64 y.o. male who presents complaining of sudden onset of intermittent (x2 episodes) burning chest pain/pressure that was first noticed at around 2015 on his way out of his house to his car. He notes mild SOA during episodes but denies nausea, vomiting, and diaphoresis. He notes that he continued on his way to [facility] to  food and upon ambulating in home after dinner, his chest began to \"burn\" again with radiation into the jaw.  He notes that the pain also began to feel more like a pressure. Pt does not note any precipitating factors. Per pt, he was given NTG en route to hospital by EMS with complete resolution of CP. He reports Hx of 60% cardiac blockage (x6 years ago), esophagitis and takes omeprazole, water pill, and beta-blocker.           PAST MEDICAL HISTORY  Active Ambulatory Problems     Diagnosis Date Noted   • Essential hypertension 07/05/2018   • Class 1 obesity due to excess calories without serious comorbidity with body mass index (BMI) of 30.0 to 30.9 in adult 07/05/2018   • Mixed hyperlipidemia 12/24/2019     Resolved Ambulatory Problems     Diagnosis Date Noted   • No Resolved Ambulatory Problems     Past Medical History:   Diagnosis Date   • Chronic kidney disease    • Hypertension    • ENRRIQUE on CPAP    • Seasonal allergies        PAST SURGICAL HISTORY  Past Surgical History:   Procedure Laterality Date   • CARPAL TUNNEL RELEASE Bilateral    • CHOLECYSTECTOMY     • COLONOSCOPY  10/06/2014    [facility]   • ENDOSCOPY  08/29/2014    Severe esophagitis, gastritis, neg for H pylori, Walters's, and fungi   • GASTRIC BYPASS     • TOTAL HIP ARTHROPLASTY         FAMILY HISTORY  Family History   Problem Relation Age of Onset   • Hypertension Mother    • Arrhythmia Mother    • " Hyperlipidemia Neg Hx    • Heart disease Neg Hx    • Heart failure Neg Hx    • Heart attack Neg Hx        SOCIAL HISTORY  Social History     Socioeconomic History   • Marital status:      Spouse name: Not on file   • Number of children: Not on file   • Years of education: Not on file   • Highest education level: Not on file   Tobacco Use   • Smoking status: Never Smoker   • Smokeless tobacco: Never Used   Substance and Sexual Activity   • Alcohol use: Yes     Comment: RARELY   • Drug use: No   • Sexual activity: Defer       ALLERGIES  Patient has no known allergies.    REVIEW OF SYSTEMS  Review of Systems   Constitutional: Negative for activity change, appetite change, diaphoresis and fever.   HENT: Negative for congestion and sore throat.    Eyes: Negative.    Respiratory: Positive for shortness of breath ( during episodes of CP). Negative for cough.    Cardiovascular: Positive for chest pain ( burning/pressure). Negative for leg swelling.   Gastrointestinal: Negative for abdominal pain, diarrhea, nausea and vomiting.   Endocrine: Negative.    Genitourinary: Negative for decreased urine volume and dysuria.   Musculoskeletal: Negative for neck pain.   Skin: Negative for rash and wound.   Allergic/Immunologic: Negative.    Neurological: Negative for weakness, numbness and headaches.   Hematological: Negative.    Psychiatric/Behavioral: Negative.    All other systems reviewed and are negative.      PHYSICAL EXAM  ED Triage Vitals [01/24/20 2311]   Temp Heart Rate Resp BP SpO2   97.2 °F (36.2 °C) 70 16 151/82 95 %      Temp src Heart Rate Source Patient Position BP Location FiO2 (%)   Tympanic Monitor -- -- --       Physical Exam   Constitutional: He is oriented to person, place, and time. No distress.   HENT:   Head: Normocephalic and atraumatic.   Eyes: Pupils are equal, round, and reactive to light. EOM are normal.   Neck: Normal range of motion. Neck supple.   Cardiovascular: Normal rate, regular rhythm and  normal heart sounds.   Pulmonary/Chest: Effort normal and breath sounds normal. No respiratory distress. He exhibits no tenderness.   Abdominal: Soft. There is no tenderness. There is no rebound and no guarding.   Musculoskeletal: Normal range of motion. He exhibits no edema.   No pedal edema or calf tenderness   Neurological: He is alert and oriented to person, place, and time. He has normal sensation and normal strength.   Skin: Skin is warm and dry.   Psychiatric: Mood and affect normal.   Nursing note and vitals reviewed.      LAB RESULTS  Lab Results (last 24 hours)     Procedure Component Value Units Date/Time    CBC & Differential [[id number]] Collected:  01/25/20 0001    Specimen:  Blood Updated:  01/25/20 0015    Narrative:       The following orders were created for panel order CBC & Differential.  Procedure                               Abnormality         Status                     ---------                               -----------         ------                     CBC Auto Differential[[id number]]        Abnormal            Final result                 Please view results for these tests on the individual orders.    Comprehensive Metabolic Panel [[id number]]  (Abnormal) Collected:  01/25/20 0001    Specimen:  Blood Updated:  01/25/20 0035     Glucose 104 mg/dL      BUN 18 mg/dL      Creatinine 0.91 mg/dL      Sodium 139 mmol/L      Potassium 4.5 mmol/L      Chloride 105 mmol/L      CO2 25.2 mmol/L      Calcium 9.1 mg/dL      Total Protein 6.4 g/dL      Albumin 3.90 g/dL      ALT (SGPT) 13 U/L      AST (SGOT) 16 U/L      Alkaline Phosphatase 104 U/L      Total Bilirubin 0.2 mg/dL      eGFR Non African Amer 84 mL/min/1.73      Globulin 2.5 gm/dL      A/G Ratio 1.6 g/dL      BUN/Creatinine Ratio 19.8     Anion Gap 8.8 mmol/L     Narrative:       GFR Normal >60  Chronic Kidney Disease <60  Kidney Failure <15      Troponin [[id number]]  (Normal) Collected:  01/25/20 0001    Specimen:  Blood Updated:  01/25/20  0035     Troponin T <0.010 ng/mL     Narrative:       Troponin T Reference Range:  <= 0.03 ng/mL-   Negative for AMI  >0.03 ng/mL-     Abnormal for myocardial necrosis.  Clinicians would have to utilize clinical acumen, EKG, Troponin and serial changes to determine if it is an Acute Myocardial Infarction or myocardial injury due to an underlying chronic condition.       Results may be falsely decreased if patient taking Biotin.      CBC Auto Differential [[id number]]  (Abnormal) Collected:  01/25/20 0001    Specimen:  Blood Updated:  01/25/20 0015     WBC 5.46 10*3/mm3      RBC 4.45 10*6/mm3      Hemoglobin 12.6 g/dL      Hematocrit 39.0 %      MCV 87.6 fL      MCH 28.3 pg      MCHC 32.3 g/dL      RDW 12.9 %      RDW-SD 40.8 fl      MPV 9.9 fL      Platelets 214 10*3/mm3      Neutrophil % 58.7 %      Lymphocyte % 27.7 %      Monocyte % 10.6 %      Eosinophil % 2.4 %      Basophil % 0.4 %      Immature Grans % 0.2 %      Neutrophils, Absolute 3.21 10*3/mm3      Lymphocytes, Absolute 1.51 10*3/mm3      Monocytes, Absolute 0.58 10*3/mm3      Eosinophils, Absolute 0.13 10*3/mm3      Basophils, Absolute 0.02 10*3/mm3      Immature Grans, Absolute 0.01 10*3/mm3      nRBC 0.0 /100 WBC     Troponin [[id number]]  (Normal) Collected:  01/25/20 0113    Specimen:  Blood Updated:  01/25/20 0142     Troponin T <0.010 ng/mL     Narrative:       Troponin T Reference Range:  <= 0.03 ng/mL-   Negative for AMI  >0.03 ng/mL-     Abnormal for myocardial necrosis.  Clinicians would have to utilize clinical acumen, EKG, Troponin and serial changes to determine if it is an Acute Myocardial Infarction or myocardial injury due to an underlying chronic condition.       Results may be falsely decreased if patient taking Biotin.      Troponin [[id number]]  (Normal) Collected:  01/25/20 0253    Specimen:  Blood Updated:  01/25/20 0325     Troponin T <0.010 ng/mL     Narrative:       Troponin T Reference Range:  <= 0.03 ng/mL-   Negative for  AMI  >0.03 ng/mL-     Abnormal for myocardial necrosis.  Clinicians would have to utilize clinical acumen, EKG, Troponin and serial changes to determine if it is an Acute Myocardial Infarction or myocardial injury due to an underlying chronic condition.       Results may be falsely decreased if patient taking Biotin.            I ordered the above labs and reviewed the results    RADIOLOGY  XR Chest 2 View   Preliminary Result   1. No active disease               I ordered the above noted radiological studies. Interpreted by radiologist. Discussed with radiologist. Reviewed by me in PACS.       PROCEDURES  Procedures  EKG          EKG time: 2317  Rhythm/Rate: NSR rate 58  P waves and IL: nl  QRS, axis: inferior Q waves present   ST and T waves: no acute ST or T wave changes     Interpreted Contemporaneously by me, independently viewed  changed compared to prior 08/27/14 - PVCs are no longer present, inferior Q waves are more pronounced       HEART SCORE    History Moderately suspicious (1)  ECG Normal (0)  Age 46-65 (1)  Risk factors 1 or 2 (1)  Troponin < or = Normal limit (0)    This patient's HEART score is 3    HEART Score Key:  Scores 0-3: 0.9-1.7% risk of adverse cardiac event. In the HEART Score study, these patients were discharged (0.99% in the retrospective study, 1.7% in the prospective study)  Scores 4-6: 12-16.6% risk of adverse cardiac event. In the HEART Score study, these patients were admitted to the hospital. (11.6% retrospective, 16.6% prospective)  Scores ?7: 50-65% risk of adverse cardiac event. In the HEART Score study, these patients were candidates for early invasive measures. (65.2% retrospective, 50.1% prospective)           PROGRESS AND CONSULTS     0129: Upon exam, I informed pt of negative Troponin level and nl EKG and CXR. Discussed plan to obtain second Troponin in approx 1 hour.    0336: Pt rechecked and resting comfortably, in NAD. Informed pt of negative repeat Troponin. I  "recommended inpatient admission for further catheterization; however, pt declines admission. Discussed plan for discharge with cardiology follow up. Pt understands and agrees with the plan, all questions answered.    0339: Spoke with Dr. Flores (emergency physician) about pt case. After a bedside evaluation, he agrees with the plan for care.       MEDICAL DECISION MAKING  Results were reviewed/discussed with the patient and they were also made aware of online access. Pt also made aware that some labs, such as cultures, will not be resulted during ER visit and follow up with PMD is necessary.     MDM  Number of Diagnoses or Management Options     Amount and/or Complexity of Data Reviewed  Clinical lab tests: reviewed and ordered (Negative initial and repeat Troponin)  Tests in the radiology section of CPT®: ordered and reviewed (No active disease)  Tests in the medicine section of CPT®: ordered and reviewed (See \"procedures\" section of note)  Decide to obtain previous medical records or to obtain history from someone other than the patient: yes           DIAGNOSIS  Final diagnoses:   Chest pain, unspecified type       DISPOSITION  DISCHARGE    Patient discharged in stable condition.    Reviewed implications of results, diagnosis, meds, responsibility to follow up, warning signs and symptoms of possible worsening, potential complications and reasons to return to ER.    Patient/Family voiced understanding of above instructions.    Discussed plan for discharge, as there is no emergent indication for admission. Patient referred to primary care provider for BP management due to today's BP. Pt/family is agreeable and understands need for follow up and repeat testing.  Pt is aware that discharge does not mean that nothing is wrong but it indicates no emergency is present that requires admission and they must continue care with follow-up as given below or physician of their choice.     FOLLOW-UP  Minerva Adams, " MD  [address] [facility] [address]  [phone]    Schedule an appointment as soon as possible for a visit            Medication List      No changes were made to your prescriptions during this visit.           Latest Documented Vital Signs:  As of 4:09 AM  BP- 137/78 HR- 62 Temp- 97.2 °F (36.2 °C) (Tympanic) O2 sat- 98%    --  Documentation assistance provided by dustin Lee for VIGNESH Wen.  Information recorded by the scribe was done at my direction and has been verified and validated by me.       Aleah Lee  01/25/20 0343       Rubin Salazar PA  01/25/20 0416

## 2020-01-25 NOTE — ED PROVIDER NOTES
Pt is a 64 y.o. male who presents to the ED complaining of sudden onset of intermittent CP that started around 2015 on 01/24/20. Pt reports he is improvement of his sxs now. Pt denies nausea, vomiting, diaphoresis. He is due for a stress test as per his cardiologist recommendation.      On exam,  Constitutional: Pt is awake, alert, and oriented in no distress.   Cardiovascular: RRR  Pulmonary: CTAB, no respiratory distress    Labs and imaging reviewed.     Plan: Plan to discharge and have him follow up with his cardiologist (Dr. Adams).      MD ATTESTATION NOTE    The JAIDEN and I have discussed this patient's history, physical exam, and treatment plan.  I have reviewed the documentation and personally had a face to face interaction with the patient. I affirm the documentation and agree with the treatment and plan.  The attached note describes my personal findings.      Documentation assistance provided by dustin Zelaya for Dr. Pernell Floers MD. Information recorded by the scribe was done at my direction and has been verified and validated by me.               Asha Zelaya  01/25/20 0349       Pernell Flores MD  01/25/20 0663

## 2020-01-27 NOTE — PROGRESS NOTES
Subjective:        Armando Dixon Jr. is a 64 y.o. male who here for follow up    No chief complaint on file.  Hospital follow up for chest pain    HPI     Armando Dixon is a 64-year-old male, who is known to this provider.  He has a history of CKD, hypertension, ENRRIQUE on CPAP and seasonal allergies.  He recently went to [facility] ED with complaints of sudden onset intermittent chest pain on 1/24/20.  He stated chest pain occurred a week prior to hospital visit but it improved. Chest x-ray showed no active disease.  Troponin negative x1, and normal EKG.  At that time patient declined admission for cardiac catheterization.    Last lipid profile on 1/13/2020 showed , HDL 50, LDL 85, and triglycerides 82.  Stress test on 4/30/2018 showed a normal myocardial perfusion study with no evidence of ischemia. Echo showed EF 61%, LA size is mildly dilated, and no evidence of pericardial effusion.     He denies chest pain, shortness of breath, syncope, near syncope.      The following portions of the patient's history were reviewed and updated as appropriate: allergies, current medications, past family history, past medical history, past social history, past surgical history and problem list.    Past Medical History:   Diagnosis Date   • Chronic kidney disease     chronic stones  has had 4 eposodes   • Hypertension    • ENRRIQUE on CPAP    • Seasonal allergies          reports that he has never smoked. He has never used smokeless tobacco. He reports that he drinks alcohol. He reports that he does not use drugs.     Family History   Problem Relation Age of Onset   • Hypertension Mother    • Arrhythmia Mother    • Hyperlipidemia Neg Hx    • Heart disease Neg Hx    • Heart failure Neg Hx    • Heart attack Neg Hx        ROS     Review of Systems  Constitutional: No wt loss, fever, fatigue  Gastrointestinal: No nausea, abdominal pain  Behavioral/Psych: No insomnia or anxiety  Cardiovascular: Denies chest pain and shortness of  breath currently, he states that it comes and goes.  Denies syncope, and near syncope.       Objective:           Physical Exam   Constitutional: He is oriented to person, place, and time. He appears well-developed and well-nourished.   HENT:   Head: Normocephalic.   Right Ear: External ear normal.   Left Ear: External ear normal.   Eyes: EOM are normal.   Neck: Normal range of motion. No JVD present.   Cardiovascular: Normal rate, regular rhythm, normal heart sounds and intact distal pulses. Exam reveals no gallop and no friction rub.   No murmur heard.  Pulmonary/Chest: Effort normal and breath sounds normal. No stridor. No respiratory distress. He has no rales.   Abdominal: Soft. Bowel sounds are normal. He exhibits no distension. There is no tenderness. There is no guarding.   Musculoskeletal: Normal range of motion. He exhibits no edema or tenderness.   Neurological: He is alert and oriented to person, place, and time. He has normal reflexes.   Skin: Skin is warm.   Psychiatric: He has a normal mood and affect. Judgment normal.   Nursing note and vitals reviewed.      1/25/20  Procedures     4/30/2018  Interpretation Summary        · Findings consistent with an equivocal ECG stress test.  · Left ventricular ejection fraction is normal (Calculated EF = 60%).  · Myocardial perfusion imaging indicates a normal myocardial perfusion study with no evidence of ischemia.  · Impressions are consistent with a low risk study.     4/30/18  Interpretation Summary     · Left atrial cavity size is mildly dilated.  · Calculated EF = 61%.  · There is no evidence of pericardial effusion.        IMPRESSION:  1.  Normal left ventricular systolic function.   2.  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  His symptoms are more likely to be gastrointestinal.  I would not like   to stent this vessel until gastrointestinal  causes are ruled out.    Current Outpatient Medications:   •  alfuzosin (UROXATRAL) 10 MG 24 hr tablet, , Disp: , Rfl:   •  diclofenac (VOLTAREN) 75 MG EC tablet, Take  by mouth., Disp: , Rfl:   •  DULoxetine (CYMBALTA) 60 MG capsule, Take 60 mg by mouth., Disp: , Rfl:   •  gabapentin (NEURONTIN) 300 MG capsule, , Disp: , Rfl:   •  HYDROcodone-acetaminophen (NORCO)  MG per tablet, Take 1 tablet by mouth., Disp: , Rfl:   •  lamoTRIgine (LaMICtal) 200 MG tablet, , Disp: , Rfl:   •  montelukast (SINGULAIR) 10 MG tablet, , Disp: , Rfl:   •  omeprazole (priLOSEC) 40 MG capsule, Take 40 mg by mouth., Disp: , Rfl:   •  triamterene-hydrochlorothiazide (DYAZIDE) 37.5-25 MG per capsule, Take 1 capsule by mouth Every Morning., Disp: 30 capsule, Rfl: 1     Assessment:        Patient Active Problem List   Diagnosis   • Essential hypertension   • Class 1 obesity due to excess calories without serious comorbidity with body mass index (BMI) of 30.0 to 30.9 in adult   • Mixed hyperlipidemia               Plan:    1.  Hospital follow-up for chest pain: He was recently went to the ED for shortness of breath with intermediate chest pain.  Panel was negative x1, chest x-ray normal and ECG was WNL.  He was asked to stay for further evaluation  and a cardiac catheterization.  He declined at that time. He states the chest pain comes and goes.  I will have him do a stress test.     It was explained to the patient that stress testing carries 85% specificity/sensitivity, and does not rule out future cardiac event.  Risks of the procedure were explained to the patient including shortness of breath, induction of myocardial infarction, and dizziness.  Patient is agreeable to proceeding with stress testing.        2. Hypertension: Today in the office his blood pressure is controlled on current medications.    Educated patient on exercising for at least 30 minutes a day for 2 to 3 days a week. Importance of controlling hypertension and blood  pressure checkup on the regular basis has been explained. Hypertension as a silent killer has been discussed. Risk reduction of the weight and regular exercises to control the hypertension has been explained.    3. ENRRIQUE: CPAP compliant    4.  CAD: Last lipid profile 1/13/2020 showed , HDL 50, LDL 85, and triglycerides 82.  Recent ischemic work-up as above.  I will do a stress test.     Risk reduction for the coronary artery disease, controlling the blood pressure, blood sugar management, cholesterol management, exercise, stress management, and proper compliance with medications and follow-up has been discussed     5. Shortness of breath: None today, but comes and goes. Will do echo.    6. Obesity: BMI  41.5. He has been doing a diet program since Sept. 2019.    No diagnosis found.    There are no diagnoses linked to this encounter.    COUNSELING:    Armando Dixon Jr.Counseling was given to patient for the following topics: diagnostic results, risk factor reductions, impressions, risks and benefits of treatment options and importance of treatment compliance .       SMOKING COUNSELING: Denies    We will call in nitro, do a stress test and echo. He will follow up with Dr. Horton for results.        Sincerely,   EDU Maldonado  [facility]  1/29/20  [id number]    EMR Dragon/Transcription disclaimer:   Much of this encounter note is an electronic transcription/translation of spoken language to printed text. The electronic translation of spoken language may permit erroneous, or at times, nonsensical words or phrases to be inadvertently transcribed; Although I have reviewed the note for such errors, some may still exist.

## 2020-01-29 ENCOUNTER — OFFICE VISIT (OUTPATIENT)
Dept: CARDIOLOGY | Facility: CLINIC | Age: 65
End: 2020-01-29

## 2020-01-29 VITALS
BODY MASS INDEX: 41.3 KG/M2 | HEIGHT: 66 IN | DIASTOLIC BLOOD PRESSURE: 65 MMHG | WEIGHT: 257 LBS | HEART RATE: 63 BPM | SYSTOLIC BLOOD PRESSURE: 122 MMHG

## 2020-01-29 DIAGNOSIS — R06.02 SHORTNESS OF BREATH: ICD-10-CM

## 2020-01-29 DIAGNOSIS — I25.110 CORONARY ARTERY DISEASE INVOLVING NATIVE HEART WITH UNSTABLE ANGINA PECTORIS, UNSPECIFIED VESSEL OR LESION TYPE (HCC): ICD-10-CM

## 2020-01-29 DIAGNOSIS — G47.33 OBSTRUCTIVE SLEEP APNEA SYNDROME: ICD-10-CM

## 2020-01-29 DIAGNOSIS — I10 ESSENTIAL HYPERTENSION: ICD-10-CM

## 2020-01-29 DIAGNOSIS — R07.2 PRECORDIAL PAIN: Primary | ICD-10-CM

## 2020-01-29 DIAGNOSIS — E66.01 MORBID OBESITY WITH BMI OF 40.0-44.9, ADULT (HCC): ICD-10-CM

## 2020-01-29 DIAGNOSIS — Z09 HOSPITAL DISCHARGE FOLLOW-UP: ICD-10-CM

## 2020-01-29 PROCEDURE — 99214 OFFICE O/P EST MOD 30 MIN: CPT | Performed by: NURSE PRACTITIONER

## 2020-01-29 RX ORDER — NITROGLYCERIN 0.4 MG/1
TABLET SUBLINGUAL
Qty: 25 TABLET | Refills: 3 | Status: SHIPPED | OUTPATIENT
Start: 2020-01-29 | End: 2021-08-16 | Stop reason: SDUPTHER

## 2020-02-20 ENCOUNTER — HOSPITAL ENCOUNTER (OUTPATIENT)
Dept: CARDIOLOGY | Facility: HOSPITAL | Age: 65
Discharge: HOME | End: 2020-02-20

## 2020-02-20 ENCOUNTER — HOSPITAL ENCOUNTER (OUTPATIENT)
Dept: CARDIOLOGY | Facility: HOSPITAL | Age: 65
Discharge: HOME | End: 2020-02-20
Admitting: NURSE PRACTITIONER

## 2020-02-20 VITALS
DIASTOLIC BLOOD PRESSURE: 78 MMHG | HEIGHT: 66 IN | OXYGEN SATURATION: 93 % | SYSTOLIC BLOOD PRESSURE: 144 MMHG | BODY MASS INDEX: 42.75 KG/M2 | RESPIRATION RATE: 18 BRPM | HEART RATE: 59 BPM | WEIGHT: 266 LBS

## 2020-02-20 LAB
AORTIC DIMENSIONLESS INDEX: 0.6 (DI)
BH CV ECHO MEAS - ACS: 2.3 CM
BH CV ECHO MEAS - AO MAX PG (FULL): 5 MMHG
BH CV ECHO MEAS - AO MAX PG: 9.2 MMHG
BH CV ECHO MEAS - AO MEAN PG (FULL): 3 MMHG
BH CV ECHO MEAS - AO MEAN PG: 5 MMHG
BH CV ECHO MEAS - AO ROOT AREA (BSA CORRECTED): 1.6
BH CV ECHO MEAS - AO ROOT AREA: 9.6 CM^2
BH CV ECHO MEAS - AO ROOT DIAM: 3.5 CM
BH CV ECHO MEAS - AO V2 MAX: 152 CM/SEC
BH CV ECHO MEAS - AO V2 MEAN: 106 CM/SEC
BH CV ECHO MEAS - AO V2 VTI: 34.6 CM
BH CV ECHO MEAS - AVA(I,A): 2.4 CM^2
BH CV ECHO MEAS - AVA(I,D): 2.4 CM^2
BH CV ECHO MEAS - AVA(V,A): 2.6 CM^2
BH CV ECHO MEAS - AVA(V,D): 2.6 CM^2
BH CV ECHO MEAS - BSA(HAYCOCK): 2.4 M^2
BH CV ECHO MEAS - BSA: 2.2 M^2
BH CV ECHO MEAS - BZI_BMI: 41.5 KILOGRAMS/M^2
BH CV ECHO MEAS - BZI_METRIC_HEIGHT: 167.6 CM
BH CV ECHO MEAS - BZI_METRIC_WEIGHT: 116.6 KG
BH CV ECHO MEAS - EDV(CUBED): 74.1 ML
BH CV ECHO MEAS - EDV(MOD-SP2): 119 ML
BH CV ECHO MEAS - EDV(MOD-SP4): 127 ML
BH CV ECHO MEAS - EDV(TEICH): 78.6 ML
BH CV ECHO MEAS - EF(CUBED): 70.4 %
BH CV ECHO MEAS - EF(MOD-BP): 62 %
BH CV ECHO MEAS - EF(MOD-SP2): 69.7 %
BH CV ECHO MEAS - EF(MOD-SP4): 59.1 %
BH CV ECHO MEAS - EF(TEICH): 62.4 %
BH CV ECHO MEAS - ESV(CUBED): 22 ML
BH CV ECHO MEAS - ESV(MOD-SP2): 36 ML
BH CV ECHO MEAS - ESV(MOD-SP4): 52 ML
BH CV ECHO MEAS - ESV(TEICH): 29.6 ML
BH CV ECHO MEAS - FS: 33.3 %
BH CV ECHO MEAS - IVS/LVPW: 1
BH CV ECHO MEAS - IVSD: 1.3 CM
BH CV ECHO MEAS - LAT PEAK E' VEL: 10.8 CM/SEC
BH CV ECHO MEAS - LV DIASTOLIC VOL/BSA (35-75): 57.1 ML/M^2
BH CV ECHO MEAS - LV MASS(C)D: 200.6 GRAMS
BH CV ECHO MEAS - LV MASS(C)DI: 90.1 GRAMS/M^2
BH CV ECHO MEAS - LV MAX PG: 4.2 MMHG
BH CV ECHO MEAS - LV MEAN PG: 2 MMHG
BH CV ECHO MEAS - LV SYSTOLIC VOL/BSA (12-30): 23.4 ML/M^2
BH CV ECHO MEAS - LV V1 MAX: 103 CM/SEC
BH CV ECHO MEAS - LV V1 MEAN: 68.6 CM/SEC
BH CV ECHO MEAS - LV V1 VTI: 21.9 CM
BH CV ECHO MEAS - LVIDD: 4.2 CM
BH CV ECHO MEAS - LVIDS: 2.8 CM
BH CV ECHO MEAS - LVLD AP2: 8.1 CM
BH CV ECHO MEAS - LVLD AP4: 8.2 CM
BH CV ECHO MEAS - LVLS AP2: 6.8 CM
BH CV ECHO MEAS - LVLS AP4: 6.9 CM
BH CV ECHO MEAS - LVOT AREA (M): 3.8 CM^2
BH CV ECHO MEAS - LVOT AREA: 3.8 CM^2
BH CV ECHO MEAS - LVOT DIAM: 2.2 CM
BH CV ECHO MEAS - LVPWD: 1.3 CM
BH CV ECHO MEAS - MED PEAK E' VEL: 9.4 CM/SEC
BH CV ECHO MEAS - MV A DUR: 0.14 SEC
BH CV ECHO MEAS - MV A MAX VEL: 89.2 CM/SEC
BH CV ECHO MEAS - MV DEC SLOPE: 470 CM/SEC^2
BH CV ECHO MEAS - MV DEC TIME: 153 SEC
BH CV ECHO MEAS - MV E MAX VEL: 114 CM/SEC
BH CV ECHO MEAS - MV E/A: 1.3
BH CV ECHO MEAS - MV MAX PG: 5.4 MMHG
BH CV ECHO MEAS - MV MEAN PG: 2 MMHG
BH CV ECHO MEAS - MV P1/2T MAX VEL: 127 CM/SEC
BH CV ECHO MEAS - MV P1/2T: 79.1 MSEC
BH CV ECHO MEAS - MV V2 MAX: 116 CM/SEC
BH CV ECHO MEAS - MV V2 MEAN: 68.7 CM/SEC
BH CV ECHO MEAS - MV V2 VTI: 31.8 CM
BH CV ECHO MEAS - MVA P1/2T LCG: 1.7 CM^2
BH CV ECHO MEAS - MVA(P1/2T): 2.8 CM^2
BH CV ECHO MEAS - MVA(VTI): 2.6 CM^2
BH CV ECHO MEAS - PA ACC TIME: 0.09 SEC
BH CV ECHO MEAS - PA MAX PG (FULL): 1.3 MMHG
BH CV ECHO MEAS - PA MAX PG: 4.2 MMHG
BH CV ECHO MEAS - PA PR(ACCEL): 38.5 MMHG
BH CV ECHO MEAS - PA V2 MAX: 103 CM/SEC
BH CV ECHO MEAS - PULM A REVS DUR: 0.11 SEC
BH CV ECHO MEAS - PULM A REVS VEL: 28.3 CM/SEC
BH CV ECHO MEAS - PULM DIAS VEL: 38.1 CM/SEC
BH CV ECHO MEAS - PULM S/D: 1.5
BH CV ECHO MEAS - PULM SYS VEL: 57 CM/SEC
BH CV ECHO MEAS - RV MAX PG: 3 MMHG
BH CV ECHO MEAS - RV MEAN PG: 2 MMHG
BH CV ECHO MEAS - RV V1 MAX: 86.5 CM/SEC
BH CV ECHO MEAS - RV V1 MEAN: 66.9 CM/SEC
BH CV ECHO MEAS - RV V1 VTI: 21.1 CM
BH CV ECHO MEAS - SI(AO): 149.6 ML/M^2
BH CV ECHO MEAS - SI(CUBED): 23.4 ML/M^2
BH CV ECHO MEAS - SI(LVOT): 37.4 ML/M^2
BH CV ECHO MEAS - SI(MOD-SP2): 37.3 ML/M^2
BH CV ECHO MEAS - SI(MOD-SP4): 33.7 ML/M^2
BH CV ECHO MEAS - SI(TEICH): 22 ML/M^2
BH CV ECHO MEAS - SV(AO): 332.9 ML
BH CV ECHO MEAS - SV(CUBED): 52.1 ML
BH CV ECHO MEAS - SV(LVOT): 83.2 ML
BH CV ECHO MEAS - SV(MOD-SP2): 83 ML
BH CV ECHO MEAS - SV(MOD-SP4): 75 ML
BH CV ECHO MEAS - SV(TEICH): 49 ML
BH CV ECHO MEAS - TAPSE (>1.6): 2 CM2
BH CV ECHO MEASUREMENTS AVERAGE E/E' RATIO: 11.29
BH CV STRESS BP STAGE 1: NORMAL
BH CV STRESS BP STAGE 2: NORMAL
BH CV STRESS BP STAGE 3: NORMAL
BH CV STRESS DURATION MIN STAGE 1: 3
BH CV STRESS DURATION MIN STAGE 2: 3
BH CV STRESS DURATION MIN STAGE 3: 1
BH CV STRESS DURATION SEC STAGE 1: 0
BH CV STRESS DURATION SEC STAGE 2: 0
BH CV STRESS DURATION SEC STAGE 3: 47
BH CV STRESS GRADE STAGE 1: 0
BH CV STRESS GRADE STAGE 2: 10
BH CV STRESS GRADE STAGE 3: 12
BH CV STRESS HR STAGE 1: 99
BH CV STRESS HR STAGE 2: 125
BH CV STRESS HR STAGE 3: 136
BH CV STRESS METS STAGE 1: 2.3
BH CV STRESS METS STAGE 2: 4.6
BH CV STRESS METS STAGE 3: 6.5
BH CV STRESS PROTOCOL 1: NORMAL
BH CV STRESS RECOVERY BP: NORMAL MMHG
BH CV STRESS RECOVERY HR: 76 BPM
BH CV STRESS SPEED STAGE 1: 1.7
BH CV STRESS SPEED STAGE 2: 1.7
BH CV STRESS SPEED STAGE 3: 2.3
BH CV STRESS STAGE 1: NORMAL
BH CV STRESS STAGE 2: 1
BH CV STRESS STAGE 3: 2
BH CV XLRA - RV BASE: 3.7 CM
BH CV XLRA - TDI S': 14.1 CM/SEC
LEFT ATRIUM VOLUME INDEX: 29.3 ML/M2
LV EF NUC BP: 60 %
MAXIMAL PREDICTED HEART RATE: 157 BPM
MAXIMAL PREDICTED HEART RATE: 157 BPM
PERCENT MAX PREDICTED HR: 86.62 %
STRESS BASELINE BP: NORMAL MMHG
STRESS BASELINE HR: 57 BPM
STRESS O2 SAT REST: 93 %
STRESS PERCENT HR: 102 %
STRESS POST ESTIMATED WORKLOAD: 6.5 METS
STRESS POST EXERCISE DUR MIN: 7 MIN
STRESS POST EXERCISE DUR SEC: 47 SEC
STRESS POST PEAK BP: NORMAL MMHG
STRESS POST PEAK HR: 136 BPM
STRESS TARGET HR: 133 BPM
STRESS TARGET HR: 133 BPM

## 2020-02-20 PROCEDURE — 25010000002 REGADENOSON 0.4 MG/5ML SOLUTION: Performed by: INTERNAL MEDICINE

## 2020-02-20 PROCEDURE — 93306 TTE W/DOPPLER COMPLETE: CPT | Performed by: INTERNAL MEDICINE

## 2020-02-20 PROCEDURE — 78452 HT MUSCLE IMAGE SPECT MULT: CPT

## 2020-02-20 PROCEDURE — 93018 CV STRESS TEST I&R ONLY: CPT | Performed by: INTERNAL MEDICINE

## 2020-02-20 PROCEDURE — 93306 TTE W/DOPPLER COMPLETE: CPT

## 2020-02-20 PROCEDURE — 78452 HT MUSCLE IMAGE SPECT MULT: CPT | Performed by: INTERNAL MEDICINE

## 2020-02-20 PROCEDURE — 93016 CV STRESS TEST SUPVJ ONLY: CPT | Performed by: INTERNAL MEDICINE

## 2020-02-20 PROCEDURE — A9500 TC99M SESTAMIBI: HCPCS | Performed by: INTERNAL MEDICINE

## 2020-02-20 PROCEDURE — 93017 CV STRESS TEST TRACING ONLY: CPT

## 2020-02-20 PROCEDURE — 0 TECHNETIUM SESTAMIBI: Performed by: INTERNAL MEDICINE

## 2020-02-20 RX ORDER — LIFITEGRAST 50 MG/ML
SOLUTION/ DROPS OPHTHALMIC
COMMUNITY
Start: 2020-02-19 | End: 2021-08-16

## 2020-02-20 RX ADMIN — TECHNETIUM TC 99M SESTAMIBI 1 DOSE: 1 INJECTION INTRAVENOUS at 11:57

## 2020-02-20 RX ADMIN — REGADENOSON 0.4 MG: 0.08 INJECTION, SOLUTION INTRAVENOUS at 11:56

## 2020-02-20 RX ADMIN — TECHNETIUM TC 99M SESTAMIBI 1 DOSE: 1 INJECTION INTRAVENOUS at 09:02

## 2020-02-24 ENCOUNTER — OFFICE VISIT (OUTPATIENT)
Dept: CARDIOLOGY | Facility: CLINIC | Age: 65
End: 2020-02-24

## 2020-02-24 ENCOUNTER — HOSPITAL ENCOUNTER (OUTPATIENT)
Dept: CARDIOLOGY | Facility: HOSPITAL | Age: 65
Discharge: HOME | End: 2020-02-24
Admitting: INTERNAL MEDICINE

## 2020-02-24 VITALS
WEIGHT: 263 LBS | SYSTOLIC BLOOD PRESSURE: 157 MMHG | HEART RATE: 64 BPM | HEIGHT: 66 IN | BODY MASS INDEX: 42.27 KG/M2 | DIASTOLIC BLOOD PRESSURE: 75 MMHG

## 2020-02-24 DIAGNOSIS — R94.30 ABNORMAL CARDIAC FUNCTION TEST: Primary | ICD-10-CM

## 2020-02-24 DIAGNOSIS — I10 ESSENTIAL HYPERTENSION: ICD-10-CM

## 2020-02-24 DIAGNOSIS — E66.09 CLASS 1 OBESITY DUE TO EXCESS CALORIES WITHOUT SERIOUS COMORBIDITY WITH BODY MASS INDEX (BMI) OF 30.0 TO 30.9 IN ADULT: ICD-10-CM

## 2020-02-24 DIAGNOSIS — R07.89 CHEST PAIN, ATYPICAL: ICD-10-CM

## 2020-02-24 DIAGNOSIS — E78.2 MIXED HYPERLIPIDEMIA: ICD-10-CM

## 2020-02-24 LAB
ANION GAP SERPL CALCULATED.3IONS-SCNC: 10.2 MMOL/L (ref 5–15)
APTT PPP: 31.3 SECONDS (ref 22.7–35.4)
BASOPHILS # BLD AUTO: 0.02 10*3/MM3 (ref 0–0.2)
BASOPHILS NFR BLD AUTO: 0.3 % (ref 0–1.5)
BUN BLD-MCNC: 18 MG/DL (ref 8–23)
BUN/CREAT SERPL: 22 (ref 7–25)
CALCIUM SPEC-SCNC: 8.8 MG/DL (ref 8.6–10.5)
CHLORIDE SERPL-SCNC: 103 MMOL/L (ref 98–107)
CO2 SERPL-SCNC: 24.8 MMOL/L (ref 22–29)
CREAT BLD-MCNC: 0.82 MG/DL (ref 0.76–1.27)
DEPRECATED RDW RBC AUTO: 39.8 FL (ref 37–54)
EOSINOPHIL # BLD AUTO: 0.22 10*3/MM3 (ref 0–0.4)
EOSINOPHIL NFR BLD AUTO: 3.6 % (ref 0.3–6.2)
ERYTHROCYTE [DISTWIDTH] IN BLOOD BY AUTOMATED COUNT: 12.7 % (ref 12.3–15.4)
GFR SERPL CREATININE-BSD FRML MDRD: 95 ML/MIN/1.73
GLUCOSE BLD-MCNC: 94 MG/DL (ref 65–99)
HCT VFR BLD AUTO: 37.8 % (ref 37.5–51)
HGB BLD-MCNC: 12.4 G/DL (ref 13–17.7)
IMM GRANULOCYTES # BLD AUTO: 0.02 10*3/MM3 (ref 0–0.05)
IMM GRANULOCYTES NFR BLD AUTO: 0.3 % (ref 0–0.5)
INR PPP: 0.97 (ref 0.9–1.1)
LYMPHOCYTES # BLD AUTO: 1.84 10*3/MM3 (ref 0.7–3.1)
LYMPHOCYTES NFR BLD AUTO: 30.2 % (ref 19.6–45.3)
MCH RBC QN AUTO: 28.3 PG (ref 26.6–33)
MCHC RBC AUTO-ENTMCNC: 32.8 G/DL (ref 31.5–35.7)
MCV RBC AUTO: 86.3 FL (ref 79–97)
MONOCYTES # BLD AUTO: 0.55 10*3/MM3 (ref 0.1–0.9)
MONOCYTES NFR BLD AUTO: 9 % (ref 5–12)
NEUTROPHILS # BLD AUTO: 3.44 10*3/MM3 (ref 1.7–7)
NEUTROPHILS NFR BLD AUTO: 56.6 % (ref 42.7–76)
NRBC BLD AUTO-RTO: 0 /100 WBC (ref 0–0.2)
PLATELET # BLD AUTO: 227 10*3/MM3 (ref 140–450)
PMV BLD AUTO: 10.7 FL (ref 6–12)
POTASSIUM BLD-SCNC: 4.1 MMOL/L (ref 3.5–5.2)
PROTHROMBIN TIME: 12.6 SECONDS (ref 11.7–14.2)
RBC # BLD AUTO: 4.38 10*6/MM3 (ref 4.14–5.8)
SODIUM BLD-SCNC: 138 MMOL/L (ref 136–145)
WBC NRBC COR # BLD: 6.09 10*3/MM3 (ref 3.4–10.8)

## 2020-02-24 PROCEDURE — 36415 COLL VENOUS BLD VENIPUNCTURE: CPT | Performed by: INTERNAL MEDICINE

## 2020-02-24 PROCEDURE — 85730 THROMBOPLASTIN TIME PARTIAL: CPT | Performed by: INTERNAL MEDICINE

## 2020-02-24 PROCEDURE — 85025 COMPLETE CBC W/AUTO DIFF WBC: CPT | Performed by: INTERNAL MEDICINE

## 2020-02-24 PROCEDURE — 99214 OFFICE O/P EST MOD 30 MIN: CPT | Performed by: INTERNAL MEDICINE

## 2020-02-24 PROCEDURE — 85610 PROTHROMBIN TIME: CPT | Performed by: INTERNAL MEDICINE

## 2020-02-24 PROCEDURE — 80048 BASIC METABOLIC PNL TOTAL CA: CPT | Performed by: INTERNAL MEDICINE

## 2020-02-24 NOTE — PROGRESS NOTES
Subjective:        Armando Dixon Jr. is a 64 y.o. male who here for follow up    CC  Cp  Abnormal cardiac fun test    Tightness in chest and sob getting worse  HPI  64-year-old male known history of the hypertension hyperlipidemia still complains of chest pains and tightness in the chest underwent a stress test which is abnormal, patient also complains of shortness of breath which is getting worse     Problem List Items Addressed This Visit        Cardiovascular and Mediastinum    Abnormal cardiac function test - Primary    Relevant Orders    Case Request Cath Lab: Left Heart Cath (Completed)    CBC & Differential (Completed)    Basic Metabolic Panel (Completed)    aPTT (Completed)    Protime-INR (Completed)    CBC Auto Differential (Completed)    Essential hypertension    Mixed hyperlipidemia       Digestive    Class 1 obesity due to excess calories without serious comorbidity with body mass index (BMI) of 30.0 to 30.9 in adult      Other Visit Diagnoses     Chest pain, atypical        Relevant Orders    CBC & Differential (Completed)    Basic Metabolic Panel (Completed)    aPTT (Completed)    Protime-INR (Completed)    CBC Auto Differential (Completed)        .Interpretation Summary        · Findings consistent with an abnormal ECG stress test.  · Left ventricular ejection fraction is normal (Calculated EF = 60%).  · Myocardial perfusion imaging indicates a medium-sized, moderately severe area of ischemia located in the anterior wall and inferior wall.  · Impressions are consistent with a high risk study.     Interpretation Summary     · Calculated EF = 62.0%  · There is no evidence of pericardial effusion.         The following portions of the patient's history were reviewed and updated as appropriate: allergies, current medications, past family history, past medical history, past social history, past surgical history and problem list.    Past Medical History:   Diagnosis Date   • Chronic kidney disease      "chronic stones  has had 4 eposodes,no renal failure   • Coronary artery disease    • Hypertension    • ENRRIQUE on CPAP    • Seasonal allergies      reports that he has never smoked. He has never used smokeless tobacco. He reports that he drinks alcohol. He reports that he does not use drugs.   Family History   Problem Relation Age of Onset   • Hypertension Mother    • Arrhythmia Mother    • Hyperlipidemia Neg Hx    • Heart disease Neg Hx    • Heart failure Neg Hx    • Heart attack Neg Hx        Review of Systems  Constitutional: No wt loss, fever, fatigue  Gastrointestinal: No nausea, abdominal pain  Behavioral/Psych: No insomnia or anxiety   Cardiovascular chest pain and shortness of breath  Objective:       Physical Exam  /75 (BP Location: Left arm, Patient Position: Sitting)   Pulse 64   Ht 167.6 cm (66\")   Wt 119 kg (263 lb)   BMI 42.45 kg/m²   General appearance: No acute changes   Neck: Trachea midline; NECK, supple, no thyromegaly or lymphadenopathy   Lungs: Normal size and shape, normal breath sounds, equal distribution of air, no rales and rhonchi   CV: S1-S2 regular, no murmurs, no rub, no gallop   Abdomen: Soft, non-tender; no masses , no abnormal abdominal sounds   Extremities: No deformity , normal color , no peripheral edema   Skin: Normal temperature, turgor and texture; no rash, ulcers          Procedures      Echocardiogram:        Current Outpatient Medications:   •  alfuzosin (UROXATRAL) 10 MG 24 hr tablet, , Disp: , Rfl:   •  diclofenac (VOLTAREN) 75 MG EC tablet, Take  by mouth., Disp: , Rfl:   •  DULoxetine (CYMBALTA) 60 MG capsule, Take 60 mg by mouth 2 (Two) Times a Day., Disp: , Rfl:   •  gabapentin (NEURONTIN) 300 MG capsule, 300 mg 3 (Three) Times a Day., Disp: , Rfl:   •  HYDROcodone-acetaminophen (NORCO)  MG per tablet, Take 1 tablet by mouth., Disp: , Rfl:   •  lamoTRIgine (LaMICtal) 200 MG tablet, , Disp: , Rfl:   •  montelukast (SINGULAIR) 10 MG tablet, , Disp: , Rfl:   •  " nitroglycerin (NITROSTAT) 0.4 MG SL tablet, 1 under the tongue as needed for angina, may repeat q5mins for up three doses, Disp: 25 tablet, Rfl: 3  •  omeprazole (priLOSEC) 40 MG capsule, Take 40 mg by mouth 2 (Two) Times a Day., Disp: , Rfl:   •  triamterene-hydrochlorothiazide (DYAZIDE) 37.5-25 MG per capsule, Take 1 capsule by mouth Every Morning., Disp: 30 capsule, Rfl: 1  •  XIIDRA 5 % solution, , Disp: , Rfl:    Assessment:        Patient Active Problem List   Diagnosis   • Essential hypertension   • Class 1 obesity due to excess calories without serious comorbidity with body mass index (BMI) of 30.0 to 30.9 in adult   • Mixed hyperlipidemia               Plan:            ICD-10-CM ICD-9-CM   1. Abnormal cardiac function test R94.30 794.30   2. Chest pain, atypical R07.89 786.59   3. Essential hypertension I10 401.9   4. Mixed hyperlipidemia E78.2 272.2   5. Class 1 obesity due to excess calories without serious comorbidity with body mass index (BMI) of 30.0 to 30.9 in adult E66.09 278.00    Z68.30 V85.30     1. Abnormal cardiac function test  Procedure, risks and options of cardiac cath explained to pt INCLUDING BUT NOT LIMITED TO MI, STROKE, DEATH, INFECTION HAEMORRHAGE, . Pt understands well and agrees with no further questions.  - Case Request Cath Lab: Left Heart Cath  - CBC & Differential  - Basic Metabolic Panel  - aPTT  - Protime-INR  - CBC Auto Differential    2. Chest pain, atypical  Needs further evaluation  - CBC & Differential  - Basic Metabolic Panel  - aPTT  - Protime-INR  - CBC Auto Differential    3. Essential hypertension  Blood pressure controlled    4. Mixed hyperlipidemia  Risk of the hyperlipidemia, importance of the treatment has been explained    Pros and cons of the antilipemic drugs has been explained    Regular blood workup as well as side effects including the liver failure, myelopathy death has been explained          5. Class 1 obesity due to excess calories without serious  comorbidity with body mass index (BMI) of 30.0 to 30.9 in adult  Counseling has been done       Needs cath    Procedure, risks and options of cardiac cath explained to pt INCLUDING BUT NOT LIMITED TO MI, STROKE, DEATH, INFECTION HAEMORRHAGE, . Pt understands well and agrees with no further questions.  COUNSELING:    Armando Dixon Jr.Counseling was given to patient for the following topics: diagnostic results, risk factor reductions, impressions, risks and benefits of treatment options and importance of treatment compliance .       SMOKING COUNSELING:    [unfilled]    Dictated using Dragon dictation

## 2020-02-26 RX ORDER — TRIAMTERENE AND HYDROCHLOROTHIAZIDE 37.5; 25 MG/1; MG/1
1 CAPSULE ORAL EVERY MORNING
Qty: 30 CAPSULE | Refills: 0 | Status: SHIPPED | OUTPATIENT
Start: 2020-02-26 | End: 2020-03-30

## 2020-02-28 ENCOUNTER — HOSPITAL ENCOUNTER (OUTPATIENT)
Facility: HOSPITAL | Age: 65
Discharge: HOME | End: 2020-02-29
Attending: INTERNAL MEDICINE | Admitting: INTERNAL MEDICINE

## 2020-02-28 DIAGNOSIS — R94.30 ABNORMAL CARDIAC FUNCTION TEST: ICD-10-CM

## 2020-02-28 PROCEDURE — 93458 L HRT ARTERY/VENTRICLE ANGIO: CPT | Performed by: INTERNAL MEDICINE

## 2020-02-28 PROCEDURE — 85347 COAGULATION TIME ACTIVATED: CPT

## 2020-02-28 PROCEDURE — C1894 INTRO/SHEATH, NON-LASER: HCPCS | Performed by: INTERNAL MEDICINE

## 2020-02-28 PROCEDURE — 25010000002 MIDAZOLAM PER 1 MG: Performed by: INTERNAL MEDICINE

## 2020-02-28 PROCEDURE — 92921 PR PRQ TRLUML CORONARY ANGIOPLASTY ADDL BRANCH: CPT | Performed by: INTERNAL MEDICINE

## 2020-02-28 PROCEDURE — G0378 HOSPITAL OBSERVATION PER HR: HCPCS

## 2020-02-28 PROCEDURE — 0 IOPAMIDOL PER 1 ML: Performed by: INTERNAL MEDICINE

## 2020-02-28 PROCEDURE — C1874 STENT, COATED/COV W/DEL SYS: HCPCS | Performed by: INTERNAL MEDICINE

## 2020-02-28 PROCEDURE — 25010000002 HEPARIN (PORCINE) PER 1000 UNITS: Performed by: INTERNAL MEDICINE

## 2020-02-28 PROCEDURE — 25010000002 PHENYLEPHRINE PER 1 ML: Performed by: INTERNAL MEDICINE

## 2020-02-28 PROCEDURE — C1887 CATHETER, GUIDING: HCPCS | Performed by: INTERNAL MEDICINE

## 2020-02-28 PROCEDURE — C1725 CATH, TRANSLUMIN NON-LASER: HCPCS | Performed by: INTERNAL MEDICINE

## 2020-02-28 PROCEDURE — 99153 MOD SED SAME PHYS/QHP EA: CPT | Performed by: INTERNAL MEDICINE

## 2020-02-28 PROCEDURE — C1769 GUIDE WIRE: HCPCS | Performed by: INTERNAL MEDICINE

## 2020-02-28 PROCEDURE — 99152 MOD SED SAME PHYS/QHP 5/>YRS: CPT | Performed by: INTERNAL MEDICINE

## 2020-02-28 PROCEDURE — 92928 PRQ TCAT PLMT NTRAC ST 1 LES: CPT | Performed by: INTERNAL MEDICINE

## 2020-02-28 PROCEDURE — 25010000002 FENTANYL CITRATE (PF) 100 MCG/2ML SOLUTION: Performed by: INTERNAL MEDICINE

## 2020-02-28 PROCEDURE — 92921: CPT | Performed by: INTERNAL MEDICINE

## 2020-02-28 PROCEDURE — C9600 PERC DRUG-EL COR STENT SING: HCPCS | Performed by: INTERNAL MEDICINE

## 2020-02-28 DEVICE — XIENCE SIERRA™ EVEROLIMUS ELUTING CORONARY STENT SYSTEM 2.75 MM X 08 MM / RAPID-EXCHANGE
Type: IMPLANTABLE DEVICE | Status: FUNCTIONAL
Brand: XIENCE SIERRA™

## 2020-02-28 DEVICE — XIENCE SIERRA™ EVEROLIMUS ELUTING CORONARY STENT SYSTEM 2.50 MM X 33 MM / RAPID-EXCHANGE
Type: IMPLANTABLE DEVICE | Status: FUNCTIONAL
Brand: XIENCE SIERRA™

## 2020-02-28 RX ORDER — SODIUM CHLORIDE 0.9 % (FLUSH) 0.9 %
3 SYRINGE (ML) INJECTION EVERY 12 HOURS SCHEDULED
Status: DISCONTINUED | OUTPATIENT
Start: 2020-02-28 | End: 2020-02-28 | Stop reason: HOSPADM

## 2020-02-28 RX ORDER — MONTELUKAST SODIUM 10 MG/1
10 TABLET ORAL NIGHTLY
Status: DISCONTINUED | OUTPATIENT
Start: 2020-02-28 | End: 2020-02-29 | Stop reason: HOSPADM

## 2020-02-28 RX ORDER — ASPIRIN 81 MG/1
TABLET, CHEWABLE ORAL AS NEEDED
Status: DISCONTINUED | OUTPATIENT
Start: 2020-02-28 | End: 2020-02-28 | Stop reason: HOSPADM

## 2020-02-28 RX ORDER — LIDOCAINE HYDROCHLORIDE 20 MG/ML
INJECTION, SOLUTION INFILTRATION; PERINEURAL AS NEEDED
Status: DISCONTINUED | OUTPATIENT
Start: 2020-02-28 | End: 2020-02-28 | Stop reason: HOSPADM

## 2020-02-28 RX ORDER — MIDAZOLAM HYDROCHLORIDE 1 MG/ML
INJECTION INTRAMUSCULAR; INTRAVENOUS AS NEEDED
Status: DISCONTINUED | OUTPATIENT
Start: 2020-02-28 | End: 2020-02-28 | Stop reason: HOSPADM

## 2020-02-28 RX ORDER — SENNA AND DOCUSATE SODIUM 50; 8.6 MG/1; MG/1
2 TABLET, FILM COATED ORAL 2 TIMES DAILY
Status: DISCONTINUED | OUTPATIENT
Start: 2020-02-28 | End: 2020-02-29 | Stop reason: HOSPADM

## 2020-02-28 RX ORDER — ASPIRIN 81 MG/1
81 TABLET, CHEWABLE ORAL DAILY
Status: DISCONTINUED | OUTPATIENT
Start: 2020-02-28 | End: 2020-02-29 | Stop reason: HOSPADM

## 2020-02-28 RX ORDER — HEPARIN SODIUM 1000 [USP'U]/ML
INJECTION, SOLUTION INTRAVENOUS; SUBCUTANEOUS AS NEEDED
Status: DISCONTINUED | OUTPATIENT
Start: 2020-02-28 | End: 2020-02-28 | Stop reason: HOSPADM

## 2020-02-28 RX ORDER — BISACODYL 5 MG/1
5 TABLET, DELAYED RELEASE ORAL DAILY PRN
Status: DISCONTINUED | OUTPATIENT
Start: 2020-02-28 | End: 2020-02-29 | Stop reason: HOSPADM

## 2020-02-28 RX ORDER — PANTOPRAZOLE SODIUM 40 MG/1
40 TABLET, DELAYED RELEASE ORAL EVERY MORNING
Status: DISCONTINUED | OUTPATIENT
Start: 2020-02-29 | End: 2020-02-29 | Stop reason: HOSPADM

## 2020-02-28 RX ORDER — TAMSULOSIN HYDROCHLORIDE 0.4 MG/1
0.4 CAPSULE ORAL NIGHTLY
Status: DISCONTINUED | OUTPATIENT
Start: 2020-02-28 | End: 2020-02-29 | Stop reason: HOSPADM

## 2020-02-28 RX ORDER — SODIUM CHLORIDE 0.9 % (FLUSH) 0.9 %
10 SYRINGE (ML) INJECTION AS NEEDED
Status: DISCONTINUED | OUTPATIENT
Start: 2020-02-28 | End: 2020-02-28 | Stop reason: HOSPADM

## 2020-02-28 RX ORDER — LAMOTRIGINE 100 MG/1
200 TABLET ORAL DAILY
Status: DISCONTINUED | OUTPATIENT
Start: 2020-02-28 | End: 2020-02-29 | Stop reason: HOSPADM

## 2020-02-28 RX ORDER — TRIAMTERENE AND HYDROCHLOROTHIAZIDE 37.5; 25 MG/1; MG/1
1 TABLET ORAL DAILY
Status: DISCONTINUED | OUTPATIENT
Start: 2020-02-28 | End: 2020-02-29 | Stop reason: HOSPADM

## 2020-02-28 RX ORDER — DULOXETIN HYDROCHLORIDE 60 MG/1
60 CAPSULE, DELAYED RELEASE ORAL 2 TIMES DAILY
Status: DISCONTINUED | OUTPATIENT
Start: 2020-02-28 | End: 2020-02-29 | Stop reason: HOSPADM

## 2020-02-28 RX ORDER — FENTANYL CITRATE 50 UG/ML
INJECTION, SOLUTION INTRAMUSCULAR; INTRAVENOUS AS NEEDED
Status: DISCONTINUED | OUTPATIENT
Start: 2020-02-28 | End: 2020-02-28 | Stop reason: HOSPADM

## 2020-02-28 RX ORDER — ACETAMINOPHEN 325 MG/1
650 TABLET ORAL EVERY 4 HOURS PRN
Status: DISCONTINUED | OUTPATIENT
Start: 2020-02-28 | End: 2020-02-29 | Stop reason: HOSPADM

## 2020-02-28 RX ORDER — BISACODYL 10 MG
10 SUPPOSITORY, RECTAL RECTAL DAILY PRN
Status: DISCONTINUED | OUTPATIENT
Start: 2020-02-28 | End: 2020-02-29 | Stop reason: HOSPADM

## 2020-02-28 RX ORDER — LIDOCAINE HYDROCHLORIDE 10 MG/ML
0.1 INJECTION, SOLUTION EPIDURAL; INFILTRATION; INTRACAUDAL; PERINEURAL ONCE AS NEEDED
Status: DISCONTINUED | OUTPATIENT
Start: 2020-02-28 | End: 2020-02-28 | Stop reason: HOSPADM

## 2020-02-28 RX ORDER — SODIUM CHLORIDE 9 MG/ML
75 INJECTION, SOLUTION INTRAVENOUS CONTINUOUS
Status: DISCONTINUED | OUTPATIENT
Start: 2020-02-28 | End: 2020-02-29 | Stop reason: HOSPADM

## 2020-02-28 RX ADMIN — DULOXETINE HYDROCHLORIDE 60 MG: 60 CAPSULE, DELAYED RELEASE ORAL at 21:10

## 2020-02-28 RX ADMIN — MONTELUKAST SODIUM 10 MG: 10 TABLET, FILM COATED ORAL at 21:10

## 2020-02-28 RX ADMIN — ACETAMINOPHEN 650 MG: 325 TABLET, FILM COATED ORAL at 21:10

## 2020-02-28 RX ADMIN — TAMSULOSIN HYDROCHLORIDE 0.4 MG: 0.4 CAPSULE ORAL at 21:10

## 2020-02-28 RX ADMIN — TRIAMTERENE AND HYDROCHLOROTHIAZIDE 1 TABLET: 37.5; 25 TABLET ORAL at 18:00

## 2020-02-28 RX ADMIN — LAMOTRIGINE 200 MG: 100 TABLET ORAL at 18:00

## 2020-02-28 RX ADMIN — SODIUM CHLORIDE 75 ML/HR: 9 INJECTION, SOLUTION INTRAVENOUS at 10:52

## 2020-02-28 RX ADMIN — TICAGRELOR 90 MG: 90 TABLET ORAL at 21:10

## 2020-02-29 VITALS
RESPIRATION RATE: 16 BRPM | WEIGHT: 260 LBS | TEMPERATURE: 98.4 F | DIASTOLIC BLOOD PRESSURE: 82 MMHG | HEIGHT: 66 IN | HEART RATE: 73 BPM | BODY MASS INDEX: 41.78 KG/M2 | SYSTOLIC BLOOD PRESSURE: 126 MMHG | OXYGEN SATURATION: 95 %

## 2020-02-29 PROCEDURE — 99217 PR OBSERVATION CARE DISCHARGE MANAGEMENT: CPT | Performed by: NURSE PRACTITIONER

## 2020-02-29 PROCEDURE — G0378 HOSPITAL OBSERVATION PER HR: HCPCS

## 2020-02-29 PROCEDURE — 93010 ELECTROCARDIOGRAM REPORT: CPT | Performed by: INTERNAL MEDICINE

## 2020-02-29 PROCEDURE — 93005 ELECTROCARDIOGRAM TRACING: CPT | Performed by: NURSE PRACTITIONER

## 2020-02-29 RX ORDER — ASPIRIN 81 MG/1
81 TABLET, CHEWABLE ORAL DAILY
Qty: 30 TABLET | Refills: 11 | Status: SHIPPED | OUTPATIENT
Start: 2020-03-01

## 2020-02-29 RX ADMIN — ASPIRIN 81 MG: 81 TABLET, CHEWABLE ORAL at 08:33

## 2020-02-29 RX ADMIN — DULOXETINE HYDROCHLORIDE 60 MG: 60 CAPSULE, DELAYED RELEASE ORAL at 08:34

## 2020-02-29 RX ADMIN — TRIAMTERENE AND HYDROCHLOROTHIAZIDE 1 TABLET: 37.5; 25 TABLET ORAL at 08:34

## 2020-02-29 RX ADMIN — ACETAMINOPHEN 650 MG: 325 TABLET, FILM COATED ORAL at 12:50

## 2020-02-29 RX ADMIN — TICAGRELOR 90 MG: 90 TABLET ORAL at 08:34

## 2020-02-29 RX ADMIN — PANTOPRAZOLE SODIUM 40 MG: 40 TABLET, DELAYED RELEASE ORAL at 08:33

## 2020-03-01 NOTE — DISCHARGE SUMMARY
Hospital Discharge    Patient Name: Armando Dixon Jr.  Age/Sex: 64 y.o. male  : 1955  MRN: [id number]    Encounter Provider: EDU Newby  Referring Provider: Minerva Adams MD  Place of Service: [facility] CARDIOLOGY  Patient Care Team:  Bonifacio Dodd MD as PCP - General  Bonifacio Dodd MD as PCP - Family Medicine         Date of Discharge:  2020   Date of Admit: 2020    Discharge Condition: Stable  Discharge Diagnosis:    Abnormal cardiac function test      Hospital Course:   Armando Dixon Jr. is a 64 y.o. male patient of Dr. Adams with a past medical history of coronary artery disease, hypertension, hyperlipidemia, ENRRIQUE on CPAP, seasonal allergies, CKD obesity, bipolar disorder, back/chronic pain, GERD, esophagitis who presented 2020 with chest pain, shortness of breath and abnormal stress test with planned cardiac catheterization.    2020 patient had a stress test that showed his EF was 60% with a medium sized moderately severe area of ischemia located in the anterior and inferior wall consistent with a high risk study.  Echocardiogram showed EF was 62% with normal wall motion and LV diastolic function, trace to mild MR and the rest of echo was within normal limits.    Cardiac catheterization showed to 90% lesions of the LAD and 80% ostial diagonal branch lesion treated with 2 drug-eluting stent placement which jailed the ostial diagonal was dilated with the balloon. He also had 50% RCA dominant ENRIQUE branch stenosis and 60% ostial OM circumflex of a nondominant circumflex, normal left main.  Patient did well overnight.  His repeat EKG was stable.  He does not have any scheduled labs. He had mild stable anemia on labs preop.  In 2020 HgbA1c was 5.3% and lipid panel recently well controlled with total cholesterol 151, triglycerides 82, HDL 50, LDL little above goal of 85. His vital signs were stable cardiac  catheterization.  He ambulated the unit several times without any chest pain, pressure, tightness, dyspnea or dizziness.  He was felt safe for discharge 2/29/2020 with dual antiplatelet therapy including aspirin and Brilinta.  He was given at least a week's worth of samples with Brilinta in case he had difficulty obtaining his prescription over the weekend.  He was educated on avoidance of NSAIDs.   He is referred to cardiac rehab.  Risk factor modification discussed he should follow-up with his PCP in 1 week.  He should follow-up with Dr. Adams's office in 1 week.    Objective:  Temp:  [97.8 °F (36.6 °C)-98.5 °F (36.9 °C)] 98.4 °F (36.9 °C)  Heart Rate:  [59-90] 73  Resp:  [16-17] 16  BP: (123-151)/(67-84) 126/82    Intake/Output Summary (Last 24 hours) at 2/29/2020 1933  Last data filed at 2/29/2020 1235  Gross per 24 hour   Intake 1530 ml   Output 2200 ml   Net -670 ml     Body mass index is 41.97 kg/m².      02/28/20  1028   Weight: 118 kg (260 lb)     Weight change:     Physical Exam:             General Appearance:    Alert, cooperative, in no acute distress, Obese, wearing glasses   Lungs:     Clear to auscultation.  Normal respiratory effort and rate. Room air      Heart:    Regular rhythm and normal rate, normal S1 and S2, no murmurs, gallops or rubs.     Chest Wall:    No abnormalities observed   Abdomen:     Soft, nontender, positive bowel sounds, Obese.     Extremities:   no cyanosis, or BLE pitting edema.  Moves all extremities  Right radial cath site well healed without erythema or ecchymosis, palpable proximal and distal pulses, good capillary refill, good motor function of hand, no thrill or bruit detected, site is soft and non-tender with no hematoma, no sensory deficits noted.  Palpable DP/radial pulses bilaterall.          Procedures Performed  Procedure(s):  Left Heart Cath  Coronary angiography  Left ventriculography  Percutaneous Coronary Intervention  Stent FADY coronary        Consults:  Consults     No orders found for last 30 day(s).          Pertinent Test Results:  Results from last 7 days   Lab Units 02/24/20  1021   SODIUM mmol/L 138   POTASSIUM mmol/L 4.1   CHLORIDE mmol/L 103   CO2 mmol/L 24.8   BUN mg/dL 18   CREATININE mg/dL 0.82   GLUCOSE mg/dL 94   CALCIUM mg/dL 8.8         Results from last 7 days   Lab Units 02/24/20  1021   WBC 10*3/mm3 6.09   HEMOGLOBIN g/dL 12.4*   HEMATOCRIT % 37.8   PLATELETS 10*3/mm3 227     Results from last 7 days   Lab Units 02/24/20  1021   INR  0.97   APTT seconds 31.3               Invalid input(s): LDLCALC            Discharge Medications     Discharge Medications      New Medications      Instructions Start Date   aspirin 81 MG chewable tablet   81 mg, Oral, Daily   Start Date:  March 1, 2020     ticagrelor 90 MG tablet tablet  Commonly known as:  BRILINTA   90 mg, Oral, 2 Times Daily         Continue These Medications      Instructions Start Date   alfuzosin 10 MG 24 hr tablet  Commonly known as:  UROXATRAL   10 mg, Oral, Daily      DULoxetine 60 MG capsule  Commonly known as:  CYMBALTA   60 mg, Oral, 2 Times Daily      gabapentin 300 MG capsule  Commonly known as:  NEURONTIN   300 mg, 3 Times Daily      HYDROcodone-acetaminophen  MG per tablet  Commonly known as:  NORCO   1 tablet, Oral, Every 6 Hours PRN      lamoTRIgine 200 MG tablet  Commonly known as:  LaMICtal   200 mg, Oral, Daily      montelukast 10 MG tablet  Commonly known as:  SINGULAIR   10 mg, Oral, Nightly      nitroglycerin 0.4 MG SL tablet  Commonly known as:  NITROSTAT   1 under the tongue as needed for angina, may repeat q5mins for up three doses      omeprazole 40 MG capsule  Commonly known as:  priLOSEC   40 mg, Oral, 2 Times Daily      triamterene-hydrochlorothiazide 37.5-25 MG per capsule  Commonly known as:  DYAZIDE   1 capsule, Oral, Every Morning      XIIDRA 5 % ophthalmic solution  Generic drug:  Lifitegrast   No dose, route, or frequency recorded.          Stop These Medications    diclofenac 75 MG EC tablet  Commonly known as:  VOLTAREN            Discharge Diet:    Dietary Orders (From admission, onward)     Start     Ordered    02/28/20 1526  Diet Regular; Consistent Carbohydrate, Cardiac  Diet Effective Now     Question Answer Comment   Diet Texture / Consistency Regular    Common Modifiers Consistent Carbohydrate    Common Modifiers Cardiac        02/28/20 1525                Activity at Discharge:    Activity Instructions     activitity as tolerated               Routine post cardiac catheterization/PCI discharge home care instructions:    1. No submerging procedure site below water for 7-10 days.  2. No lifting objects greater than 1 lbs for 3 days.  3. If groin site used, avoid climbing several flights of stairs or sitting for longer than 2 hours at a time for the next 24 hours.   4. Monitor puncture site for bleeding and/or knots;. If bleeding should occur at the groin site: lie flat, apply pressure and return to the ER. If bleeding should occur at the wrist site, apply pressure and return to the ER.  5.  You may apply a DRY Band-Aid over the puncture site if needed. Do not apply any lotions, salves or ointments to site.  6. No driving for 3 days.  7. Return to ER for recurrent symptoms.  8. No smoking.  9. Take all medications as prescribed.    Discharge disposition: home     Discharge Instructions and Follow ups:  No future appointments.  Additional Instructions for the Follow-ups that You Need to Schedule     Ambulatory Referral to Cardiac Rehab   As directed        Follow-up Information     [facility] .    Specialty:  Cardiac Rehabilitation  Contact information:  [address] [place] [address] [phone]           Minerva Adams MD Follow up.    Specialty:  Cardiology  Why:  Call the office to make a follow up appt in 1 week.  Contact information:  [address] [facility]  [address]  [phone]             Bonifacio Dodd MD Follow up.    Specialty:  Internal Medicine  Why:  Follow up with PCP in about 1 week  Contact information:  [address]  [place] [id number] [phone]                   Test Results Pending at Discharge: None     EDU Newby  02/29/20  7:33 PM    Time: Discharge 30 min    EMR Dragon/Transcription disclaimer:   Much of this encounter note is an electronic transcription/translation of spoken language to printed text. The electronic translation of spoken language may permit erroneous, or at times, nonsensical words or phrases to be inadvertently transcribed; Although I have reviewed the note for such errors, some may still exist.

## 2020-03-02 ENCOUNTER — TELEPHONE (OUTPATIENT)
Dept: CARDIOLOGY | Facility: CLINIC | Age: 65
End: 2020-03-02

## 2020-03-02 LAB
ACT BLD: 252 SECONDS (ref 82–152)
ACT BLD: 268 SECONDS (ref 82–152)

## 2020-03-02 NOTE — TELEPHONE ENCOUNTER
PATIENT HAD CATH Friday, D/C SUMMARY SAYS FOLLOW UP IN 1 WEEK BUT THERE IS NO APPTS AVAILABLE SO I NEED TO KNOW WHERE I CAN PUT HIM. ALSO HE WANTS TO KNOW IF HE STILL TAKES HIS ASPIRIN NOW THAT THEY PUT HIM ON BRILLINTA AND HE NEEDS A LETTER FOR WORK STATING WHAT HE CAN AND CANNOT DO AND FOR HOW LONG. HE WORKS AT [employer] PART TIME, PLEASE ADVISE THANKS

## 2020-03-10 ENCOUNTER — OFFICE VISIT (OUTPATIENT)
Dept: CARDIOLOGY | Facility: CLINIC | Age: 65
End: 2020-03-10

## 2020-03-10 VITALS
HEART RATE: 70 BPM | HEIGHT: 66 IN | SYSTOLIC BLOOD PRESSURE: 127 MMHG | WEIGHT: 258 LBS | BODY MASS INDEX: 41.46 KG/M2 | DIASTOLIC BLOOD PRESSURE: 69 MMHG

## 2020-03-10 DIAGNOSIS — I25.10 CORONARY ARTERY DISEASE INVOLVING NATIVE CORONARY ARTERY OF NATIVE HEART WITHOUT ANGINA PECTORIS: Primary | ICD-10-CM

## 2020-03-10 DIAGNOSIS — I10 ESSENTIAL HYPERTENSION: ICD-10-CM

## 2020-03-10 DIAGNOSIS — E78.2 MIXED HYPERLIPIDEMIA: ICD-10-CM

## 2020-03-10 PROCEDURE — 99214 OFFICE O/P EST MOD 30 MIN: CPT | Performed by: NURSE PRACTITIONER

## 2020-03-10 RX ORDER — ATORVASTATIN CALCIUM 20 MG/1
20 TABLET, FILM COATED ORAL DAILY
Qty: 30 TABLET | Refills: 11 | Status: SHIPPED | OUTPATIENT
Start: 2020-03-10 | End: 2021-03-11 | Stop reason: SDUPTHER

## 2020-03-10 NOTE — PROGRESS NOTES
Patient Name: Armando Dixon Jr.  :1955  Age: 64 y.o.  Primary Cardiologist: Minerva Adams MD  Encounter Provider:  EDU Spence      Chief Complaint:   Chief Complaint   Patient presents with   • Coronary Artery Disease         HPI this 64-year-old male, known to this provider, comes today in follow-up regarding recent cardiac catheterization.  Current diagnoses to include hypertension, obesity with BMI of 45.8, ENRRIQUE with CPAP therapy, and chronic kidney disease.  Echo performed 2020 revealed EF of 62% with no evidence of pericardial effusion.  Stress testing performed that time revealed a medium sized moderately severe area of ischemia in the anterior and inferior walls.  This led to cardiac catheterization 8 days later on  which yielded successful PCI with stent placement to LAD with 90% mid stenosis now reduced to 0 and diagonal ostial branch with 80% stenosis now reduced to 0, normal left main, left circumflex nondominant with ostial 60% stenosis and RCA dominant with posterior lateral 50% stenosis.  Lipid panel 2020 revealed triglycerides 82  HDL 50 and LDL 85, goal to be less than 70.  AST/ALT within normal limits at that time.  The patient is currently doing very well since his cath, and is continuing on his journey with weight watchers.        Patient Active Problem List   Diagnosis   • Essential hypertension   • Class 1 obesity due to excess calories without serious comorbidity with body mass index (BMI) of 30.0 to 30.9 in adult   • Mixed hyperlipidemia   • Abnormal cardiac function test   • Coronary artery disease involving native coronary artery of native heart without angina pectoris           The following portions of the patient's history were reviewed and updated as appropriate: allergies, current medications, past family history, past medical history, past social history, past surgical history and problem list.    Current Outpatient  Medications on File Prior to Visit   Medication Sig Dispense Refill   • alfuzosin (UROXATRAL) 10 MG 24 hr tablet Take 10 mg by mouth Daily.     • aspirin 81 MG chewable tablet Chew 1 tablet Daily. 30 tablet 11   • DULoxetine (CYMBALTA) 60 MG capsule Take 60 mg by mouth 2 (Two) Times a Day.     • gabapentin (NEURONTIN) 300 MG capsule 300 mg 3 (Three) Times a Day.     • HYDROcodone-acetaminophen (NORCO)  MG per tablet Take 1 tablet by mouth Every 6 (Six) Hours As Needed.     • lamoTRIgine (LaMICtal) 200 MG tablet Take 200 mg by mouth Daily.     • montelukast (SINGULAIR) 10 MG tablet Take 10 mg by mouth Every Night.     • omeprazole (priLOSEC) 40 MG capsule Take 40 mg by mouth 2 (Two) Times a Day.     • ticagrelor (BRILINTA) 90 MG tablet tablet Take 1 tablet by mouth 2 (Two) Times a Day. 60 tablet 11   • triamterene-hydrochlorothiazide (DYAZIDE) 37.5-25 MG per capsule Take 1 capsule by mouth Every Morning. 30 capsule 0   • XIIDRA 5 % solution      • nitroglycerin (NITROSTAT) 0.4 MG SL tablet 1 under the tongue as needed for angina, may repeat q5mins for up three doses 25 tablet 3     No current facility-administered medications on file prior to visit.        Past Medical History:   Diagnosis Date   • Chronic kidney disease     chronic stones  has had 4 eposodes,no renal failure   • Coronary artery disease    • Hypertension    • ENRRIQUE on CPAP    • Seasonal allergies        Past Surgical History:   Procedure Laterality Date   • CARDIAC CATHETERIZATION     • CARDIAC CATHETERIZATION N/A 2/28/2020    Procedure: Left Heart Cath;  Surgeon: Minerva Adams MD;  Location: [facility] CATH INVASIVE LOCATION;  Service: Cardiology;  Laterality: N/A;   • CARDIAC CATHETERIZATION N/A 2/28/2020    Procedure: Coronary angiography;  Surgeon: Minerva Adams MD;  Location: [facility] CATH INVASIVE LOCATION;  Service: Cardiology;  Laterality: N/A;   • CARDIAC CATHETERIZATION N/A 2/28/2020    Procedure: Left ventriculography;   Surgeon: Minerva Adams MD;  Location: [facility] CATH INVASIVE LOCATION;  Service: Cardiology;  Laterality: N/A;   • CARDIAC CATHETERIZATION N/A 2/28/2020    Procedure: Percutaneous Coronary Intervention;  Surgeon: Minerva Adams MD;  Location:  [facility] CATH INVASIVE LOCATION;  Service: Cardiology;  Laterality: N/A;   • CARDIAC CATHETERIZATION N/A 2/28/2020    Procedure: Stent FADY coronary;  Surgeon: Minerva Adams MD;  [facility];  Service: Cardiology;  Laterality: N/A;   • CARPAL TUNNEL RELEASE Bilateral    • CHOLECYSTECTOMY     • COLONOSCOPY  10/06/2014    [facility]   • ENDOSCOPY  08/29/2014    Severe esophagitis, gastritis, neg for H pylori, Walters's, and fungi   • GASTRIC BYPASS     • TOTAL HIP ARTHROPLASTY         Family History   Problem Relation Age of Onset   • Hypertension Mother    • Arrhythmia Mother    • Hyperlipidemia Neg Hx    • Heart disease Neg Hx    • Heart failure Neg Hx    • Heart attack Neg Hx        Social History     Socioeconomic History   • Marital status:      Spouse name: Not on file   • Number of children: Not on file   • Years of education: Not on file   • Highest education level: Not on file   Tobacco Use   • Smoking status: Never Smoker   • Smokeless tobacco: Never Used   Substance and Sexual Activity   • Alcohol use: Yes     Comment: RARELY   • Drug use: No   • Sexual activity: Defer       Review of Systems   Constitution: Negative for diaphoresis and malaise/fatigue.   HENT: Negative for nosebleeds and stridor.    Cardiovascular: Negative for chest pain, claudication, dyspnea on exertion, irregular heartbeat, leg swelling, near-syncope, orthopnea, palpitations, paroxysmal nocturnal dyspnea and syncope.   Respiratory: Negative for cough, hemoptysis, shortness of breath and wheezing.    Gastrointestinal: Negative for abdominal pain, constipation, diarrhea, hematemesis, hematochezia, melena, nausea and vomiting.   Neurological: Negative  "for dizziness, focal weakness, light-headedness and weakness.       OBJECTIVE:   Vital Signs  Vitals:    03/10/20 0923   BP: 127/69   Pulse: 70     Estimated body mass index is 41.64 kg/m² as calculated from the following:    Height as of this encounter: 167.6 cm (66\").    Weight as of this encounter: 117 kg (258 lb).    Physical Exam   Constitutional: He is oriented to person, place, and time. He appears well-developed and well-nourished. No distress.   HENT:   Head: Normocephalic and atraumatic.   Eyes: Pupils are equal, round, and reactive to light. Conjunctivae and EOM are normal.   Neck: Normal range of motion. Neck supple. No JVD present. No tracheal deviation present. No thyromegaly present.   Cardiovascular: Normal rate, regular rhythm, normal heart sounds and intact distal pulses. Exam reveals no gallop and no friction rub.   No murmur heard.  Right radial cardiac catheterization site with very mild ecchymosis, no edema, erythema, drainage, hematoma; right radial, brachial, and ulnar pulses intact   Pulmonary/Chest: Effort normal and breath sounds normal. No respiratory distress. He has no wheezes. He has no rales. He exhibits no tenderness.   Abdominal: Soft. Bowel sounds are normal. He exhibits no distension. There is no tenderness.   Musculoskeletal: Normal range of motion. He exhibits no edema, tenderness or deformity.   Neurological: He is alert and oriented to person, place, and time.   Skin: Skin is warm and dry. No rash noted. He is not diaphoretic. No erythema. No pallor.   Psychiatric: He has a normal mood and affect. His behavior is normal.       Procedures    Cardiac Cath:  2/28/2020  Conclusion        · Successful right and left coronary angiogram and LV gram  · Successful angioplasty and stent to the mid 90% x 2 lesions and the diagonal branch ostial 80% reduced to 0% with 2.5/33 and 2.75/8 Xience stent in LAD, diagonal branch was jailed but was dilated with 2.5/12 trek balloon  · Normal left " "main  · Left anterior descending long diffuse disease with a midportion consequence 90% stenosis reduced to 0% with 2.5/33 and 2.75/8 Xience stent  · Ostial diagonal branch jailed and was dilated with a 2.5/12 trek balloon  · Circumflex artery nondominant with OM ostial 60% stenosis  · Right coronary artery dominant posterolateral branch 50% stenosis  · Normal LV gram          14  IMPRESSION:  1.  Normal left ventricular systolic function.   2.  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  His symptoms are more likely to be gastrointestinal.  I would not like   to stent this vessel until gastrointestinal causes are ruled out.    Stress Testin20  Interpretation Summary        · Findings consistent with an abnormal ECG stress test.  · Left ventricular ejection fraction is normal (Calculated EF = 60%).  · Myocardial perfusion imaging indicates a medium-sized, moderately severe area of ischemia located in the anterior wall and inferior wall.  · Impressions are consistent with a high risk study.     \"Brief chest twinge\" when escorted from the treadmill states \"lasted a few seconds\"  1/10.  Asymptomatic for chest pain. Specificity of study reduced secondary to significant motion artifact due to exercise capabilities.   ECG is equivocal for suggestion of ischemia    Ectopy: Rare PVC in recovery, Frequent blocked PAC's in recovery.  Blood pressure response:  Hypertensive, Baseline /78, Peak 188/78, Recovery 190/70 -138/74  Returned to baseline 5 minutes into recovery.   Exercise Tolerance: fair with staff assistance.   Reached 18 Of  20 Alexandria Scale, Stage II.      Supervised by: Dr. Adams       Cardiac Echo:  20  Interpretation Summary     · Calculated EF = 62.0%  · There is no evidence of pericardial effusion.               ASSESSMENT:      Diagnosis Plan   1. Coronary artery disease " involving native coronary artery of native heart without angina pectoris     2. Mixed hyperlipidemia     3. Essential hypertension           PLAN OF CARE:     1.  CAD-now status post PCI to LAD.  And with no disease, RCA with 50% posterolateral stenosis and left circumflex with ostial 60% stenosis.  Patient is to remain on dual antiplatelet therapy for 1 full year.  I will also start metoprolol 12.5 mg twice daily as well as atorvastatin 20 mg daily.      2.  Hypertension- blood pressure in office today is currently stable and controlled.  Last echo and ischemic work-up as above.  Will start metoprolol given his CAD.  We will have him follow-up in 2 weeks to reevaluate how he is doing on the beta-blocker.    Importance of controlling hypertension and blood pressure  checkup on the regular basis has been explained  Hypertension as a silent killer has been discussed  Risk reduction of the weight and regular exercises to control the hypertension has been explained      3.  Hyperlipidemia- LDL goal to now be less than 70.  Will start atorvastatin 20 mg daily.  He will need repeat lipid panel and CMP in 3 months.    Risk of the hyperlipidemia, importance of the treatment has been explained  Pros and cons of the statins has been explained  Regular blood workup as well as side effects including the liver failure, myelopathy death has been explained        4.  ENRRIQUE- patient is compliant with CPAP therapy.  Follows with pulmonary.    5.  Obesity- BMI 42.0.  Patient continues to lose weight and do well.  Encouraged to continue to follow a healthy diet and regular exercise of at least 30 minutes daily.        Start metoprolol 12.5 mg twice daily, atorvastatin 20 mg daily, follow-up in 2 weeks or sooner if needed.  Sincerely,   EDU Spence  [facility]  03/10/20  09:46

## 2020-03-24 ENCOUNTER — OFFICE VISIT (OUTPATIENT)
Dept: CARDIOLOGY | Facility: CLINIC | Age: 65
End: 2020-03-24

## 2020-03-24 VITALS
BODY MASS INDEX: 41.3 KG/M2 | SYSTOLIC BLOOD PRESSURE: 118 MMHG | HEART RATE: 62 BPM | HEIGHT: 66 IN | DIASTOLIC BLOOD PRESSURE: 71 MMHG | WEIGHT: 257 LBS

## 2020-03-24 DIAGNOSIS — I25.10 CORONARY ARTERY DISEASE INVOLVING NATIVE CORONARY ARTERY OF NATIVE HEART WITHOUT ANGINA PECTORIS: ICD-10-CM

## 2020-03-24 DIAGNOSIS — E78.2 MIXED HYPERLIPIDEMIA: Primary | ICD-10-CM

## 2020-03-24 DIAGNOSIS — I10 ESSENTIAL HYPERTENSION: ICD-10-CM

## 2020-03-24 PROCEDURE — 99213 OFFICE O/P EST LOW 20 MIN: CPT | Performed by: NURSE PRACTITIONER

## 2020-03-24 NOTE — PROGRESS NOTES
Patient Name: Armando Dixon Jr.  :1955  Age: 64 y.o.  Primary Cardiologist: Minerva Adams MD  Encounter Provider:  EDU Spence      Chief Complaint:   Chief Complaint   Patient presents with   • Hypertension     BP CHECK         HPI This 64-year-old male, known to this provider, comes today in follow-up regarding recent cardiac catheterization.  Current diagnoses to include hypertension, obesity with BMI of 45.8, ENRRIQUE with CPAP therapy, and chronic kidney disease.  Echo performed 2020 revealed EF of 62% with no evidence of pericardial effusion.  Stress testing performed that time revealed a medium sized moderately severe area of ischemia in the anterior and inferior walls.  This led to cardiac catheterization 8 days later on  which yielded successful PCI with stent placement to LAD with 90% mid stenosis now reduced to 0 and diagonal ostial branch with 80% stenosis now reduced to 0, normal left main, left circumflex nondominant with ostial 60% stenosis and RCA dominant with posterior lateral 50% stenosis.  Lipid panel 2020 revealed triglycerides 82  HDL 50 and LDL 85, goal to be less than 70.  AST/ALT within normal limits at that time.  Atorvastatin and metoprolol started at most recent office visit.  Blood pressure tolerating well.  The patient is currently doing well and states his shortness of breath is significantly improved, however he is experiencing significant joint pain secondary to his arthritis given he can no longer take his NSAIDs.      Patient Active Problem List   Diagnosis   • Essential hypertension   • Class 1 obesity due to excess calories without serious comorbidity with body mass index (BMI) of 30.0 to 30.9 in adult   • Mixed hyperlipidemia   • Abnormal cardiac function test   • Coronary artery disease involving native coronary artery of native heart without angina pectoris           The following portions of the patient's history  were reviewed and updated as appropriate: allergies, current medications, past family history, past medical history, past social history, past surgical history and problem list.    Current Outpatient Medications on File Prior to Visit   Medication Sig Dispense Refill   • alfuzosin (UROXATRAL) 10 MG 24 hr tablet Take 10 mg by mouth Daily.     • aspirin 81 MG chewable tablet Chew 1 tablet Daily. 30 tablet 11   • atorvastatin (LIPITOR) 20 MG tablet Take 1 tablet by mouth Daily. 30 tablet 11   • DULoxetine (CYMBALTA) 60 MG capsule Take 60 mg by mouth 2 (Two) Times a Day.     • gabapentin (NEURONTIN) 300 MG capsule 300 mg 3 (Three) Times a Day.     • HYDROcodone-acetaminophen (NORCO)  MG per tablet Take 1 tablet by mouth Every 6 (Six) Hours As Needed.     • lamoTRIgine (LaMICtal) 200 MG tablet Take 200 mg by mouth Daily.     • metoprolol tartrate (LOPRESSOR) 25 MG tablet Take 0.5 tablets by mouth 2 (Two) Times a Day. 30 tablet 11   • montelukast (SINGULAIR) 10 MG tablet Take 10 mg by mouth Every Night.     • omeprazole (priLOSEC) 40 MG capsule Take 40 mg by mouth 2 (Two) Times a Day.     • ticagrelor (BRILINTA) 90 MG tablet tablet Take 1 tablet by mouth 2 (Two) Times a Day. 60 tablet 11   • triamterene-hydrochlorothiazide (DYAZIDE) 37.5-25 MG per capsule Take 1 capsule by mouth Every Morning. 30 capsule 0   • XIIDRA 5 % solution      • nitroglycerin (NITROSTAT) 0.4 MG SL tablet 1 under the tongue as needed for angina, may repeat q5mins for up three doses 25 tablet 3     No current facility-administered medications on file prior to visit.        Past Medical History:   Diagnosis Date   • Chronic kidney disease     chronic stones  has had 4 eposodes,no renal failure   • Coronary artery disease    • Hypertension    • ENRRIQUE on CPAP    • Seasonal allergies        Past Surgical History:   Procedure Laterality Date   • CARDIAC CATHETERIZATION     • CARDIAC CATHETERIZATION N/A 2/28/2020    Procedure: Left Heart Cath;   Surgeon: Minerva Adams MD;  Location:  [facility] CATH INVASIVE LOCATION;  Service: Cardiology;  Laterality: N/A;   • CARDIAC CATHETERIZATION N/A 2/28/2020    Procedure: Coronary angiography;  Surgeon: Minerva Adams MD;  Location:  [facility] CATH INVASIVE LOCATION;  Service: Cardiology;  Laterality: N/A;   • CARDIAC CATHETERIZATION N/A 2/28/2020    Procedure: Left ventriculography;  Surgeon: Minerva Adams MD;  Location:  [facility] CATH INVASIVE LOCATION;  Service: Cardiology;  Laterality: N/A;   • CARDIAC CATHETERIZATION N/A 2/28/2020    Procedure: Percutaneous Coronary Intervention;  Surgeon: Minerva Adams MD;  Location:  [facility] CATH INVASIVE LOCATION;  Service: Cardiology;  Laterality: N/A;   • CARDIAC CATHETERIZATION N/A 2/28/2020    Procedure: Stent FADY coronary;  Surgeon: Minerva Adams MD;  Location: [facility] CATH INVASIVE LOCATION;  Service: Cardiology;  Laterality: N/A;   • CARPAL TUNNEL RELEASE Bilateral    • CHOLECYSTECTOMY     • COLONOSCOPY  10/06/2014    [facility]   • ENDOSCOPY  08/29/2014    Severe esophagitis, gastritis, neg for H pylori, Walters's, and fungi   • GASTRIC BYPASS     • TOTAL HIP ARTHROPLASTY         Family History   Problem Relation Age of Onset   • Hypertension Mother    • Arrhythmia Mother    • Hyperlipidemia Neg Hx    • Heart disease Neg Hx    • Heart failure Neg Hx    • Heart attack Neg Hx        Social History     Socioeconomic History   • Marital status:      Spouse name: Not on file   • Number of children: Not on file   • Years of education: Not on file   • Highest education level: Not on file   Tobacco Use   • Smoking status: Never Smoker   • Smokeless tobacco: Never Used   Substance and Sexual Activity   • Alcohol use: Yes     Comment: RARELY   • Drug use: No   • Sexual activity: Defer       Review of Systems   Constitution: Negative for diaphoresis and malaise/fatigue.   HENT: Negative for nosebleeds and stridor.    Cardiovascular: Negative for chest  "pain, claudication, dyspnea on exertion, irregular heartbeat, leg swelling, near-syncope, orthopnea, palpitations, paroxysmal nocturnal dyspnea and syncope.   Respiratory: Negative for cough, hemoptysis, shortness of breath and wheezing.    Gastrointestinal: Negative for abdominal pain, constipation, diarrhea, hematemesis, hematochezia, melena, nausea and vomiting.   Neurological: Negative for dizziness, focal weakness, light-headedness and weakness.       OBJECTIVE:   Vital Signs  Vitals:    03/24/20 1027   BP: 118/71   Pulse: 62     Estimated body mass index is 41.48 kg/m² as calculated from the following:    Height as of this encounter: 167.6 cm (66\").    Weight as of this encounter: 117 kg (257 lb).    Physical Exam   Constitutional: He is oriented to person, place, and time. He appears well-developed and well-nourished. No distress.   HENT:   Head: Normocephalic and atraumatic.   Eyes: Pupils are equal, round, and reactive to light. Conjunctivae and EOM are normal.   Neck: Normal range of motion. Neck supple. No JVD present. No tracheal deviation present. No thyromegaly present.   Cardiovascular: Normal rate, regular rhythm, normal heart sounds and intact distal pulses. Exam reveals no gallop and no friction rub.   No murmur heard.  Right radial cardiac catheterization site with very mild ecchymosis, no edema, erythema, drainage, hematoma; right radial, brachial, and ulnar pulses intact   Pulmonary/Chest: Effort normal and breath sounds normal. No respiratory distress. He has no wheezes. He has no rales. He exhibits no tenderness.   Abdominal: Soft. Bowel sounds are normal. He exhibits no distension. There is no tenderness.   Musculoskeletal: Normal range of motion. He exhibits no edema, tenderness or deformity.   Neurological: He is alert and oriented to person, place, and time.   Skin: Skin is warm and dry. No rash noted. He is not diaphoretic. No erythema. No pallor.   Psychiatric: He has a normal mood and " "affect. His behavior is normal.       Procedures    Cardiac Cath:  2020  Conclusion        · Successful right and left coronary angiogram and LV gram  · Successful angioplasty and stent to the mid 90% x 2 lesions and the diagonal branch ostial 80% reduced to 0% with 2.5/33 and 2.75/8 Xience stent in LAD, diagonal branch was jailed but was dilated with 2.5/12 trek balloon  · Normal left main  · Left anterior descending long diffuse disease with a midportion consequence 90% stenosis reduced to 0% with 2.5/33 and 2.75/8 Xience stent  · Ostial diagonal branch jailed and was dilated with a 2.5/12 trek balloon  · Circumflex artery nondominant with OM ostial 60% stenosis  · Right coronary artery dominant posterolateral branch 50% stenosis  · Normal LV gram          14  IMPRESSION:  1.  Normal left ventricular systolic function.   2.  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  His symptoms are more likely to be gastrointestinal.  I would not like   to stent this vessel until gastrointestinal causes are ruled out.    Stress Testin20  Interpretation Summary        · Findings consistent with an abnormal ECG stress test.  · Left ventricular ejection fraction is normal (Calculated EF = 60%).  · Myocardial perfusion imaging indicates a medium-sized, moderately severe area of ischemia located in the anterior wall and inferior wall.  · Impressions are consistent with a high risk study.     \"Brief chest twinge\" when escorted from the treadmill states \"lasted a few seconds\"  1/10.  Asymptomatic for chest pain. Specificity of study reduced secondary to significant motion artifact due to exercise capabilities.   ECG is equivocal for suggestion of ischemia    Ectopy: Rare PVC in recovery, Frequent blocked PAC's in recovery.  Blood pressure response:  Hypertensive, Baseline /78, Peak 188/78, Recovery " 190/70 -138/74  Returned to baseline 5 minutes into recovery.   Exercise Tolerance: fair with staff assistance.   Reached 18 Of  20 Alexandria Scale, Stage II.      Supervised by: Dr. Adams       Cardiac Echo:  2/20/20  Interpretation Summary     · Calculated EF = 62.0%  · There is no evidence of pericardial effusion.               ASSESSMENT:      Diagnosis Plan   1. Mixed hyperlipidemia  Lipid Panel    Comprehensive Metabolic Panel   2. Coronary artery disease involving native coronary artery of native heart without angina pectoris  Lipid Panel    Comprehensive Metabolic Panel   3. Essential hypertension           PLAN OF CARE:     1.  CAD- cath report as above, now on maximum medical therapy to include dual antiplatelet therapy, atorvastatin, and metoprolol.  Tolerating metoprolol well as well as atorvastatin.  Continue current medication regimen.      2.  Hypertension- blood pressure tolerating metoprolol well.  Will continue at current dose.    Importance of controlling hypertension and blood pressure  checkup on the regular basis has been explained  Hypertension as a silent killer has been discussed  Risk reduction of the weight and regular exercises to control the hypertension has been explained      3.  Hyperlipidemia- atorvastatin started at prior office visit.  Will repeat lipid panel and CMP in 3 months.    Risk of the hyperlipidemia, importance of the treatment has been explained  Pros and cons of the statins has been explained  Regular blood workup as well as side effects including the liver failure, myelopathy death has been explained           Continue current medication regimen.  Repeat FLP and CMP in 3 months, follow-up in 3 months or sooner if needed.        Sincerely,   EDU Spence  [facility]  03/24/20  11:50

## 2020-03-25 ENCOUNTER — TELEPHONE (OUTPATIENT)
Dept: CARDIOLOGY | Age: 65
End: 2020-03-25

## 2020-03-25 NOTE — TELEPHONE ENCOUNTER
OK TO USE ANTI INFLAMMATORY TOPICAL CREAM   Received: Today   Message Contents   [facility] Clinical Pool   Phone Number: [phone]       Ok to take per Dr Adams

## 2020-03-30 RX ORDER — TRIAMTERENE AND HYDROCHLOROTHIAZIDE 37.5; 25 MG/1; MG/1
CAPSULE ORAL
Qty: 30 CAPSULE | Refills: 2 | Status: SHIPPED | OUTPATIENT
Start: 2020-03-30 | End: 2020-06-29

## 2020-05-19 ENCOUNTER — OFFICE VISIT (OUTPATIENT)
Dept: ORTHOPEDIC SURGERY | Facility: CLINIC | Age: 65
End: 2020-05-19

## 2020-05-19 VITALS — WEIGHT: 252 LBS | BODY MASS INDEX: 40.5 KG/M2 | TEMPERATURE: 97.9 F | HEIGHT: 66 IN

## 2020-05-19 DIAGNOSIS — M25.561 PAIN IN BOTH KNEES, UNSPECIFIED CHRONICITY: Primary | ICD-10-CM

## 2020-05-19 DIAGNOSIS — M25.562 PAIN IN BOTH KNEES, UNSPECIFIED CHRONICITY: Primary | ICD-10-CM

## 2020-05-19 PROCEDURE — 73562 X-RAY EXAM OF KNEE 3: CPT | Performed by: ORTHOPAEDIC SURGERY

## 2020-05-19 PROCEDURE — 99203 OFFICE O/P NEW LOW 30 MIN: CPT | Performed by: ORTHOPAEDIC SURGERY

## 2020-05-19 RX ORDER — SENNOSIDES 8.6 MG
650 CAPSULE ORAL EVERY 8 HOURS PRN
COMMUNITY
End: 2021-08-16

## 2020-05-19 NOTE — PROGRESS NOTES
willowPatient: Armando Dixon Jr.  YOB: 1955 64 y.o. male  Medical Record Number: [id number]    Chief Complaints:   Chief Complaint   Patient presents with   • Left Knee - Establish Care, Pain   • Left Foot - Establish Care, Pain       History of Present Illness:Armando Dixon Jr. is a 64 y.o. male who presents with right greater than left knee pain which is been present for years.  He has seen Dr. Morrissey and pop him at [facility] and.  Over the years he is undergone injections physical therapy without significant relief.  His knee pain is worsened to the point where he is now limited in basic activities of daily living.  He can only walk short distances.  He has a severe stabbing aching pain in the medial side of both knees.  It is worse with weightbearing.  He recently had multiple cardiac stents placed and is on Brilinta.    Allergies: No Known Allergies    Medications:   Current Outpatient Medications   Medication Sig Dispense Refill   • acetaminophen (TYLENOL) 650 MG 8 hr tablet Take 650 mg by mouth Every 8 (Eight) Hours As Needed for Mild Pain .     • alfuzosin (UROXATRAL) 10 MG 24 hr tablet Take 10 mg by mouth Daily.     • aspirin 81 MG chewable tablet Chew 1 tablet Daily. 30 tablet 11   • atorvastatin (LIPITOR) 20 MG tablet Take 1 tablet by mouth Daily. 30 tablet 11   • DULoxetine (CYMBALTA) 60 MG capsule Take 60 mg by mouth 2 (Two) Times a Day.     • gabapentin (NEURONTIN) 300 MG capsule 300 mg 3 (Three) Times a Day.     • HYDROcodone-acetaminophen (NORCO)  MG per tablet Take 1 tablet by mouth Every 6 (Six) Hours As Needed.     • lamoTRIgine (LaMICtal) 200 MG tablet Take 200 mg by mouth Daily.     • metoprolol tartrate (LOPRESSOR) 25 MG tablet Take 0.5 tablets by mouth 2 (Two) Times a Day. 30 tablet 11   • montelukast (SINGULAIR) 10 MG tablet Take 10 mg by mouth Every Night.     • nitroglycerin (NITROSTAT) 0.4 MG SL tablet 1 under the tongue as needed for angina, may  "repeat q5mins for up three doses 25 tablet 3   • omeprazole (priLOSEC) 40 MG capsule Take 40 mg by mouth 2 (Two) Times a Day.     • ticagrelor (BRILINTA) 90 MG tablet tablet Take 1 tablet by mouth 2 (Two) Times a Day. 60 tablet 11   • triamterene-hydrochlorothiazide (DYAZIDE) 37.5-25 MG per capsule TAKE ONE CAPSULE BY MOUTH EVERY MORNING 30 capsule 2   • XIIDRA 5 % solution        No current facility-administered medications for this visit.          The following portions of the patient's history were reviewed and updated as appropriate: allergies, current medications, past family history, past medical history, past social history, past surgical history and problem list.    Review of Systems:   A 14 point review of systems was performed. All systems negative except pertinent positives/negative listed in HPI above    Physical Exam:   Vitals:    05/19/20 1135   Temp: 97.9 °F (36.6 °C)   TempSrc: Temporal   Weight: 114 kg (252 lb)   Height: 167.6 cm (66\")   PainSc:   7   PainLoc: Knee  Comment: BILATERAL KNEE       General: A and O x 3, ASA, NAD    SCLERA:    Normal    DENTITION:   Normal  Knee:  bilateral    ALIGNMENT:     Varus  ,   Patella  tracks  midline    GAIT:    Antalgic    SKIN:    No abnormality    RANGE OF MOTION:   5  -  110   DEG    STRENGTH:   4  / 5    LIGAMENTS:    No varus / valgus instability.   Negative  Lachman.    MENISCUS:     Negative   Archie       DISTAL PULSES:    Paplable    DISTAL SENSATION :   Intact    LYMPHATICS:     No   lymphadenopathy    OTHER:          - Positive   effusion      - Crepitance with ROM          Radiology:  Xrays 3views BOTH KNEES (ap,lateral, sunrise) were ordered and reviewed for evaluation of knee pain demonstrating advanced varus osteoarthritis with bone on bone articulation, subchondral cysts, and periarticular osteophytes. There are no previous films for comparision.    Assessment/Plan: sEVERE r > l KNEE ENDSTAGE oa.  Continuation of conservative management vs. " TKA discussed.  The patient wishes to proceed with total knee replacement.  At this point the patient has failed the full compliment of conservative treatment and stating complete understanding of the risks/benefits/ anternatives wishes to proceed with surgical treatment.    Risk and benefits of surgery were reviewed.  Including, but not limited to, blood clots or pulmonary embolism, anesthesia risk, infection, fracture, skin/leg numbness, persistent pain/crepitance/popping/catching, failure of the implant, need for future surgeries, hematoma, possible nerve or blood vessel injury, need for transfusion, and potential risk of stroke,heart attack or death, among others.  The patient understands and wishes to proceed.     It was explained that if tissue has been repaired or reconstructed, there is also an increased chance of failure which may require further management.  Following the completion of the discussion, the patient expressed understanding of this planned course of care, all their questions were answered and consent will be obtained preoperatively.    Operative Plan: right [employer] Oxinium Total Knee Replacement a 1-2 day staywith home health rehab - is just recovering from mutliple cardiac stents - will need cardiac clearance and needs to be off Brilenta prior to surgery. Will call after clearance when ready to proceed.        Levy Cordova MD  5/19/2020

## 2020-05-27 ENCOUNTER — TREATMENT (OUTPATIENT)
Dept: PHYSICAL THERAPY | Facility: CLINIC | Age: 65
End: 2020-05-27

## 2020-05-27 DIAGNOSIS — M25.562 PAIN IN BOTH KNEES, UNSPECIFIED CHRONICITY: Primary | ICD-10-CM

## 2020-05-27 DIAGNOSIS — M25.561 PAIN IN BOTH KNEES, UNSPECIFIED CHRONICITY: Primary | ICD-10-CM

## 2020-05-27 PROCEDURE — 97110 THERAPEUTIC EXERCISES: CPT | Performed by: PHYSICAL THERAPIST

## 2020-05-27 PROCEDURE — 97161 PT EVAL LOW COMPLEX 20 MIN: CPT | Performed by: PHYSICAL THERAPIST

## 2020-05-27 NOTE — PROGRESS NOTES
Physical Therapy Initial Evaluation and Plan of Care        Subjective Evaluation    History of Present Illness  Onset date: about 5 years.  Mechanism of injury: Patient reports an increase in pain over the last 3 years.  Patient reports the knees are bone on bone and has been putting off surgery.  Patient has OA throughout the body.      History of bilateral THR.  Patient had stents put in in 2020 and if the heart MD will take him off the meds, Dr Cordova will do a TKR as soon as he can.      Patient Occupation: Part Time at [employer]  Current pain ratin  At worst pain rating: 10  Location: bilateral knees (left medial knee and right medial knee and posterior knee)  Quality: sharp  Relieving factors: rest  Aggravating factors: movement and ambulation  Progression: worsening    Diagnostic Tests  X-ray: abnormal (bone on bone)             Objective          Observations     Additional Observation Details  Patient ambulates with an antalgic gait pattern.    Active Range of Motion   Left Knee   Flexion: 112 degrees   Extension: Left knee active extension: -12.     Right Knee   Flexion: 112 degrees   Extension: Right knee active extension: -12.           Assessment & Plan     Assessment  Impairments: abnormal gait, abnormal or restricted ROM, activity intolerance, lacks appropriate home exercise program and pain with function  Assessment details: Patient presents with reports of pain, limited AROM and an antalgic gait pattern which is limiting his ability to perform ADL'S.  Barriers to therapy: none  Prognosis: fair  Prognosis details: STG's  1)  Independent with HEP  2)  Patient to perform the HEP without pain or discomfort    LTG's  1)  Continue with HEP  2)  Patient to f/u with MD to schedule surgery if cleared by MD      Plan  Therapy options: will be seen for skilled physical therapy services  Planned therapy interventions: strengthening, stretching, therapeutic activities and home exercise  program  Treatment plan discussed with: patient        Manual Therapy:    0     mins  12939;  Therapeutic Exercise:    25     mins  61575;    Neuromuscular Haley:    0    mins  75563;    Therapeutic Activity:     0     mins  05768;     Gait Trainin     mins  71855;     Ultrasound:     0     mins  22956;    Work Hardening           0      mins 08276  Iontophoresis               0   mins 09374    Timed Treatment:   25   mins   Total Treatment:     39   mins    PT SIGNATURE: Shelton Ortez, PT   DATE TREATMENT INITIATED: 2020    Initial Certification  Certification Period: 2020  I certify that the therapy services are furnished while this patient is under my care.  The services outlined above are required by this patient, and will be reviewed every 90 days.     PHYSICIAN: Levy Cordova MD      DATE:     Please sign and return via fax to [phone].. Thank you, [facility].

## 2020-06-02 ENCOUNTER — OFFICE VISIT (OUTPATIENT)
Dept: CARDIAC REHAB | Facility: HOSPITAL | Age: 65
End: 2020-06-02

## 2020-06-02 DIAGNOSIS — Z95.5 S/P DRUG ELUTING CORONARY STENT PLACEMENT: Primary | ICD-10-CM

## 2020-06-02 PROCEDURE — 93797 PHYS/QHP OP CAR RHAB WO ECG: CPT

## 2020-06-03 ENCOUNTER — TREATMENT (OUTPATIENT)
Dept: CARDIAC REHAB | Facility: HOSPITAL | Age: 65
End: 2020-06-03

## 2020-06-03 DIAGNOSIS — Z95.5 S/P DRUG ELUTING CORONARY STENT PLACEMENT: Primary | ICD-10-CM

## 2020-06-03 PROCEDURE — 93798 PHYS/QHP OP CAR RHAB W/ECG: CPT

## 2020-06-05 ENCOUNTER — TREATMENT (OUTPATIENT)
Dept: CARDIAC REHAB | Facility: HOSPITAL | Age: 65
End: 2020-06-05

## 2020-06-05 DIAGNOSIS — Z95.5 S/P DRUG ELUTING CORONARY STENT PLACEMENT: Primary | ICD-10-CM

## 2020-06-05 PROCEDURE — 93798 PHYS/QHP OP CAR RHAB W/ECG: CPT

## 2020-06-08 ENCOUNTER — TREATMENT (OUTPATIENT)
Dept: CARDIAC REHAB | Facility: HOSPITAL | Age: 65
End: 2020-06-08

## 2020-06-08 DIAGNOSIS — Z95.5 S/P DRUG ELUTING CORONARY STENT PLACEMENT: Primary | ICD-10-CM

## 2020-06-08 PROCEDURE — 93798 PHYS/QHP OP CAR RHAB W/ECG: CPT

## 2020-06-09 ENCOUNTER — DOCUMENTATION (OUTPATIENT)
Dept: PHYSICAL THERAPY | Facility: CLINIC | Age: 65
End: 2020-06-09

## 2020-06-10 ENCOUNTER — TREATMENT (OUTPATIENT)
Dept: CARDIAC REHAB | Facility: HOSPITAL | Age: 65
End: 2020-06-10

## 2020-06-10 DIAGNOSIS — Z95.5 S/P DRUG ELUTING CORONARY STENT PLACEMENT: Primary | ICD-10-CM

## 2020-06-10 PROCEDURE — 93798 PHYS/QHP OP CAR RHAB W/ECG: CPT

## 2020-06-12 ENCOUNTER — TELEPHONE (OUTPATIENT)
Dept: CARDIOLOGY | Facility: CLINIC | Age: 65
End: 2020-06-12
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
## 2020-06-15 ENCOUNTER — LAB (OUTPATIENT)
Dept: LAB | Facility: HOSPITAL | Age: 65
End: 2020-06-15

## 2020-06-15 ENCOUNTER — TREATMENT (OUTPATIENT)
Dept: CARDIAC REHAB | Facility: HOSPITAL | Age: 65
End: 2020-06-15

## 2020-06-15 DIAGNOSIS — Z95.5 S/P DRUG ELUTING CORONARY STENT PLACEMENT: Primary | ICD-10-CM

## 2020-06-15 LAB
ALBUMIN SERPL-MCNC: 4.3 G/DL (ref 3.5–5.2)
ALBUMIN/GLOB SERPL: 1.9 G/DL
ALP SERPL-CCNC: 108 U/L (ref 39–117)
ALT SERPL W P-5'-P-CCNC: 16 U/L (ref 1–41)
ANION GAP SERPL CALCULATED.3IONS-SCNC: 10.3 MMOL/L (ref 5–15)
AST SERPL-CCNC: 18 U/L (ref 1–40)
BILIRUB SERPL-MCNC: 0.4 MG/DL (ref 0.2–1.2)
BUN BLD-MCNC: 19 MG/DL (ref 8–23)
BUN/CREAT SERPL: 17 (ref 7–25)
CALCIUM SPEC-SCNC: 8.8 MG/DL (ref 8.6–10.5)
CHLORIDE SERPL-SCNC: 101 MMOL/L (ref 98–107)
CHOLEST SERPL-MCNC: 113 MG/DL (ref 0–200)
CO2 SERPL-SCNC: 25.7 MMOL/L (ref 22–29)
CREAT BLD-MCNC: 1.12 MG/DL (ref 0.76–1.27)
GFR SERPL CREATININE-BSD FRML MDRD: 66 ML/MIN/1.73
GLOBULIN UR ELPH-MCNC: 2.3 GM/DL
GLUCOSE BLD-MCNC: 127 MG/DL (ref 65–99)
HDLC SERPL-MCNC: 62 MG/DL (ref 40–60)
LDLC SERPL CALC-MCNC: 39 MG/DL (ref 0–100)
LDLC/HDLC SERPL: 0.64 {RATIO}
POTASSIUM BLD-SCNC: 4 MMOL/L (ref 3.5–5.2)
PROT SERPL-MCNC: 6.6 G/DL (ref 6–8.5)
SODIUM BLD-SCNC: 137 MMOL/L (ref 136–145)
TRIGL SERPL-MCNC: 58 MG/DL (ref 0–150)
VLDLC SERPL-MCNC: 11.6 MG/DL (ref 5–40)

## 2020-06-15 PROCEDURE — 93798 PHYS/QHP OP CAR RHAB W/ECG: CPT

## 2020-06-15 PROCEDURE — 80053 COMPREHEN METABOLIC PANEL: CPT | Performed by: NURSE PRACTITIONER

## 2020-06-15 PROCEDURE — 80061 LIPID PANEL: CPT | Performed by: NURSE PRACTITIONER

## 2020-06-15 NOTE — PROGRESS NOTES
Donato Funez has called requesting a refill of their controlled medication Tramadol  for the management of chronic pain syndrome. Last office visit date: 07/09/19  Last opioid care agreement 01/10/19  Last UDS was done 07/09/19    Date last  was pulled and reviewed : 09/23/19  Last fill date for medication was 08/29/19    Was the patient compliant when the above report was pulled? yes    Analgesia: 80%    Aberrancies: no    ADL's: yes    Adverse Reaction: yes pt states she has nausea and workmans comp will not pay for the medication. She also has constipation and she takes medication for it. Provider's last note and plan of care reviewed? yes  Request forwarded to provider for review. See scanned session report.

## 2020-06-17 ENCOUNTER — TREATMENT (OUTPATIENT)
Dept: CARDIAC REHAB | Facility: HOSPITAL | Age: 65
End: 2020-06-17

## 2020-06-17 DIAGNOSIS — Z95.5 S/P DRUG ELUTING CORONARY STENT PLACEMENT: Primary | ICD-10-CM

## 2020-06-17 PROCEDURE — 93798 PHYS/QHP OP CAR RHAB W/ECG: CPT

## 2020-06-19 ENCOUNTER — TREATMENT (OUTPATIENT)
Dept: CARDIAC REHAB | Facility: HOSPITAL | Age: 65
End: 2020-06-19

## 2020-06-19 DIAGNOSIS — Z95.5 S/P DRUG ELUTING CORONARY STENT PLACEMENT: Primary | ICD-10-CM

## 2020-06-19 PROCEDURE — 93798 PHYS/QHP OP CAR RHAB W/ECG: CPT

## 2020-06-22 ENCOUNTER — TREATMENT (OUTPATIENT)
Dept: CARDIAC REHAB | Facility: HOSPITAL | Age: 65
End: 2020-06-22

## 2020-06-22 DIAGNOSIS — Z95.5 S/P DRUG ELUTING CORONARY STENT PLACEMENT: Primary | ICD-10-CM

## 2020-06-22 PROCEDURE — 93798 PHYS/QHP OP CAR RHAB W/ECG: CPT

## 2020-06-24 ENCOUNTER — TREATMENT (OUTPATIENT)
Dept: CARDIAC REHAB | Facility: HOSPITAL | Age: 65
End: 2020-06-24

## 2020-06-24 DIAGNOSIS — Z95.5 S/P DRUG ELUTING CORONARY STENT PLACEMENT: Primary | ICD-10-CM

## 2020-06-24 PROCEDURE — 93798 PHYS/QHP OP CAR RHAB W/ECG: CPT

## 2020-06-25 ENCOUNTER — OFFICE VISIT (OUTPATIENT)
Dept: CARDIOLOGY | Facility: CLINIC | Age: 65
End: 2020-06-25

## 2020-06-25 VITALS
BODY MASS INDEX: 42.43 KG/M2 | DIASTOLIC BLOOD PRESSURE: 74 MMHG | WEIGHT: 264 LBS | SYSTOLIC BLOOD PRESSURE: 135 MMHG | HEART RATE: 60 BPM | HEIGHT: 66 IN

## 2020-06-25 DIAGNOSIS — I10 ESSENTIAL HYPERTENSION: ICD-10-CM

## 2020-06-25 DIAGNOSIS — E78.2 MIXED HYPERLIPIDEMIA: ICD-10-CM

## 2020-06-25 DIAGNOSIS — E66.09 CLASS 1 OBESITY DUE TO EXCESS CALORIES WITHOUT SERIOUS COMORBIDITY WITH BODY MASS INDEX (BMI) OF 30.0 TO 30.9 IN ADULT: ICD-10-CM

## 2020-06-25 DIAGNOSIS — I25.10 CORONARY ARTERY DISEASE INVOLVING NATIVE CORONARY ARTERY OF NATIVE HEART WITHOUT ANGINA PECTORIS: Primary | ICD-10-CM

## 2020-06-25 PROCEDURE — 99213 OFFICE O/P EST LOW 20 MIN: CPT | Performed by: INTERNAL MEDICINE

## 2020-06-26 ENCOUNTER — TREATMENT (OUTPATIENT)
Dept: CARDIAC REHAB | Facility: HOSPITAL | Age: 65
End: 2020-06-26

## 2020-06-26 DIAGNOSIS — Z95.5 S/P DRUG ELUTING CORONARY STENT PLACEMENT: Primary | ICD-10-CM

## 2020-06-26 PROCEDURE — 93798 PHYS/QHP OP CAR RHAB W/ECG: CPT

## 2020-06-29 ENCOUNTER — TREATMENT (OUTPATIENT)
Dept: CARDIAC REHAB | Facility: HOSPITAL | Age: 65
End: 2020-06-29

## 2020-06-29 DIAGNOSIS — Z95.5 S/P DRUG ELUTING CORONARY STENT PLACEMENT: Primary | ICD-10-CM

## 2020-06-29 PROCEDURE — 93798 PHYS/QHP OP CAR RHAB W/ECG: CPT

## 2020-06-29 RX ORDER — TRIAMTERENE AND HYDROCHLOROTHIAZIDE 37.5; 25 MG/1; MG/1
CAPSULE ORAL
Qty: 30 CAPSULE | Refills: 1 | Status: SHIPPED | OUTPATIENT
Start: 2020-06-29 | End: 2020-09-02

## 2020-07-01 ENCOUNTER — TREATMENT (OUTPATIENT)
Dept: CARDIAC REHAB | Facility: HOSPITAL | Age: 65
End: 2020-07-01

## 2020-07-01 DIAGNOSIS — Z95.5 S/P DRUG ELUTING CORONARY STENT PLACEMENT: Primary | ICD-10-CM

## 2020-07-01 PROCEDURE — 93798 PHYS/QHP OP CAR RHAB W/ECG: CPT

## 2020-07-06 ENCOUNTER — TREATMENT (OUTPATIENT)
Dept: CARDIAC REHAB | Facility: HOSPITAL | Age: 65
End: 2020-07-06

## 2020-07-06 DIAGNOSIS — Z95.5 S/P DRUG ELUTING CORONARY STENT PLACEMENT: Primary | ICD-10-CM

## 2020-07-06 PROCEDURE — 93798 PHYS/QHP OP CAR RHAB W/ECG: CPT

## 2020-07-08 ENCOUNTER — TREATMENT (OUTPATIENT)
Dept: CARDIAC REHAB | Facility: HOSPITAL | Age: 65
End: 2020-07-08

## 2020-07-08 DIAGNOSIS — Z95.5 S/P DRUG ELUTING CORONARY STENT PLACEMENT: Primary | ICD-10-CM

## 2020-07-08 PROCEDURE — 93798 PHYS/QHP OP CAR RHAB W/ECG: CPT

## 2020-07-10 ENCOUNTER — TREATMENT (OUTPATIENT)
Dept: CARDIAC REHAB | Facility: HOSPITAL | Age: 65
End: 2020-07-10

## 2020-07-10 DIAGNOSIS — Z95.5 S/P DRUG ELUTING CORONARY STENT PLACEMENT: Primary | ICD-10-CM

## 2020-07-10 PROCEDURE — 93798 PHYS/QHP OP CAR RHAB W/ECG: CPT

## 2020-07-13 ENCOUNTER — TREATMENT (OUTPATIENT)
Dept: CARDIAC REHAB | Facility: HOSPITAL | Age: 65
End: 2020-07-13

## 2020-07-13 DIAGNOSIS — Z95.5 S/P DRUG ELUTING CORONARY STENT PLACEMENT: Primary | ICD-10-CM

## 2020-07-13 PROCEDURE — 93798 PHYS/QHP OP CAR RHAB W/ECG: CPT

## 2020-07-15 ENCOUNTER — TREATMENT (OUTPATIENT)
Dept: CARDIAC REHAB | Facility: HOSPITAL | Age: 65
End: 2020-07-15

## 2020-07-15 DIAGNOSIS — Z95.5 S/P DRUG ELUTING CORONARY STENT PLACEMENT: Primary | ICD-10-CM

## 2020-07-15 PROCEDURE — 93798 PHYS/QHP OP CAR RHAB W/ECG: CPT
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
## 2020-07-17 ENCOUNTER — TREATMENT (OUTPATIENT)
Dept: CARDIAC REHAB | Facility: HOSPITAL | Age: 65
End: 2020-07-17

## 2020-07-17 DIAGNOSIS — Z95.5 S/P DRUG ELUTING CORONARY STENT PLACEMENT: Primary | ICD-10-CM

## 2020-07-17 PROCEDURE — 93798 PHYS/QHP OP CAR RHAB W/ECG: CPT

## 2020-07-20 ENCOUNTER — TREATMENT (OUTPATIENT)
Dept: CARDIAC REHAB | Facility: HOSPITAL | Age: 65
End: 2020-07-20

## 2020-07-20 DIAGNOSIS — Z95.5 S/P DRUG ELUTING CORONARY STENT PLACEMENT: Primary | ICD-10-CM

## 2020-07-20 PROCEDURE — 93798 PHYS/QHP OP CAR RHAB W/ECG: CPT

## 2020-07-22 ENCOUNTER — TREATMENT (OUTPATIENT)
Dept: CARDIAC REHAB | Facility: HOSPITAL | Age: 65
End: 2020-07-22

## 2020-07-22 DIAGNOSIS — Z95.5 S/P DRUG ELUTING CORONARY STENT PLACEMENT: Primary | ICD-10-CM

## 2020-07-22 PROCEDURE — 93798 PHYS/QHP OP CAR RHAB W/ECG: CPT

## 2020-07-24 ENCOUNTER — TREATMENT (OUTPATIENT)
Dept: CARDIAC REHAB | Facility: HOSPITAL | Age: 65
End: 2020-07-24

## 2020-07-24 DIAGNOSIS — Z95.5 S/P DRUG ELUTING CORONARY STENT PLACEMENT: Primary | ICD-10-CM

## 2020-07-24 PROCEDURE — 93798 PHYS/QHP OP CAR RHAB W/ECG: CPT

## 2020-07-27 ENCOUNTER — TREATMENT (OUTPATIENT)
Dept: CARDIAC REHAB | Facility: HOSPITAL | Age: 65
End: 2020-07-27

## 2020-07-27 DIAGNOSIS — Z95.5 S/P DRUG ELUTING CORONARY STENT PLACEMENT: Primary | ICD-10-CM

## 2020-07-27 PROCEDURE — 93798 PHYS/QHP OP CAR RHAB W/ECG: CPT

## 2020-07-29 ENCOUNTER — TREATMENT (OUTPATIENT)
Dept: CARDIAC REHAB | Facility: HOSPITAL | Age: 65
End: 2020-07-29

## 2020-07-29 DIAGNOSIS — Z95.5 S/P DRUG ELUTING CORONARY STENT PLACEMENT: Primary | ICD-10-CM

## 2020-07-29 PROCEDURE — 93798 PHYS/QHP OP CAR RHAB W/ECG: CPT

## 2020-07-31 ENCOUNTER — TREATMENT (OUTPATIENT)
Dept: CARDIAC REHAB | Facility: HOSPITAL | Age: 65
End: 2020-07-31

## 2020-07-31 DIAGNOSIS — Z95.5 S/P DRUG ELUTING CORONARY STENT PLACEMENT: Primary | ICD-10-CM

## 2020-07-31 PROCEDURE — 93798 PHYS/QHP OP CAR RHAB W/ECG: CPT

## 2020-08-05 ENCOUNTER — APPOINTMENT (OUTPATIENT)
Dept: CARDIAC REHAB | Facility: HOSPITAL | Age: 65
End: 2020-08-05

## 2020-08-07 ENCOUNTER — APPOINTMENT (OUTPATIENT)
Dept: CARDIAC REHAB | Facility: HOSPITAL | Age: 65
End: 2020-08-07

## 2020-08-10 ENCOUNTER — APPOINTMENT (OUTPATIENT)
Dept: CARDIAC REHAB | Facility: HOSPITAL | Age: 65
End: 2020-08-10

## 2020-08-12 ENCOUNTER — APPOINTMENT (OUTPATIENT)
Dept: CARDIAC REHAB | Facility: HOSPITAL | Age: 65
End: 2020-08-12

## 2020-08-14 ENCOUNTER — APPOINTMENT (OUTPATIENT)
Dept: CARDIAC REHAB | Facility: HOSPITAL | Age: 65
End: 2020-08-14

## 2020-08-17 ENCOUNTER — APPOINTMENT (OUTPATIENT)
Dept: CARDIAC REHAB | Facility: HOSPITAL | Age: 65
End: 2020-08-17

## 2020-08-19 ENCOUNTER — APPOINTMENT (OUTPATIENT)
Dept: CARDIAC REHAB | Facility: HOSPITAL | Age: 65
End: 2020-08-19

## 2020-08-21 ENCOUNTER — APPOINTMENT (OUTPATIENT)
Dept: CARDIAC REHAB | Facility: HOSPITAL | Age: 65
End: 2020-08-21

## 2020-08-24 ENCOUNTER — APPOINTMENT (OUTPATIENT)
Dept: CARDIAC REHAB | Facility: HOSPITAL | Age: 65
End: 2020-08-24

## 2020-09-02 RX ORDER — TRIAMTERENE AND HYDROCHLOROTHIAZIDE 37.5; 25 MG/1; MG/1
CAPSULE ORAL
Qty: 30 CAPSULE | Refills: 1 | Status: SHIPPED | OUTPATIENT
Start: 2020-09-02 | End: 2020-11-04

## 2020-11-04 RX ORDER — TRIAMTERENE AND HYDROCHLOROTHIAZIDE 37.5; 25 MG/1; MG/1
CAPSULE ORAL
Qty: 30 CAPSULE | Refills: 3 | Status: SHIPPED | OUTPATIENT
Start: 2020-11-04 | End: 2021-03-08

## 2020-12-11 ENCOUNTER — APPOINTMENT (OUTPATIENT)
Dept: CARDIOLOGY | Facility: HOSPITAL | Age: 65
End: 2020-12-11

## 2020-12-15 ENCOUNTER — HOSPITAL ENCOUNTER (OUTPATIENT)
Dept: CARDIOLOGY | Facility: HOSPITAL | Age: 65
Discharge: HOME | End: 2020-12-15
Admitting: INTERNAL MEDICINE

## 2020-12-15 LAB
ALBUMIN SERPL-MCNC: 4.2 G/DL (ref 3.5–5.2)
ALBUMIN/GLOB SERPL: 1.6 G/DL
ALP SERPL-CCNC: 106 U/L (ref 39–117)
ALT SERPL W P-5'-P-CCNC: 15 U/L (ref 1–41)
ANION GAP SERPL CALCULATED.3IONS-SCNC: 10.1 MMOL/L (ref 5–15)
AST SERPL-CCNC: 18 U/L (ref 1–40)
BILIRUB SERPL-MCNC: 0.4 MG/DL (ref 0–1.2)
BUN SERPL-MCNC: 23 MG/DL (ref 8–23)
BUN/CREAT SERPL: 22.3 (ref 7–25)
CALCIUM SPEC-SCNC: 9.4 MG/DL (ref 8.6–10.5)
CHLORIDE SERPL-SCNC: 101 MMOL/L (ref 98–107)
CHOLEST SERPL-MCNC: 118 MG/DL (ref 0–200)
CO2 SERPL-SCNC: 27.9 MMOL/L (ref 22–29)
CREAT SERPL-MCNC: 1.03 MG/DL (ref 0.76–1.27)
GFR SERPL CREATININE-BSD FRML MDRD: 73 ML/MIN/1.73
GLOBULIN UR ELPH-MCNC: 2.6 GM/DL
GLUCOSE SERPL-MCNC: 115 MG/DL (ref 65–99)
HDLC SERPL-MCNC: 59 MG/DL (ref 40–60)
LDLC SERPL CALC-MCNC: 45 MG/DL (ref 0–100)
LDLC/HDLC SERPL: 0.77 {RATIO}
POTASSIUM SERPL-SCNC: 4.6 MMOL/L (ref 3.5–5.2)
PROT SERPL-MCNC: 6.8 G/DL (ref 6–8.5)
SODIUM SERPL-SCNC: 139 MMOL/L (ref 136–145)
TRIGL SERPL-MCNC: 68 MG/DL (ref 0–150)
VLDLC SERPL-MCNC: 14 MG/DL (ref 5–40)

## 2020-12-15 PROCEDURE — 80053 COMPREHEN METABOLIC PANEL: CPT | Performed by: INTERNAL MEDICINE

## 2020-12-15 PROCEDURE — 80061 LIPID PANEL: CPT | Performed by: INTERNAL MEDICINE

## 2020-12-15 PROCEDURE — 36415 COLL VENOUS BLD VENIPUNCTURE: CPT | Performed by: INTERNAL MEDICINE

## 2020-12-17 ENCOUNTER — OFFICE VISIT (OUTPATIENT)
Dept: CARDIOLOGY | Facility: CLINIC | Age: 65
End: 2020-12-17

## 2020-12-17 VITALS
BODY MASS INDEX: 44.2 KG/M2 | WEIGHT: 275 LBS | DIASTOLIC BLOOD PRESSURE: 68 MMHG | HEART RATE: 70 BPM | HEIGHT: 66 IN | SYSTOLIC BLOOD PRESSURE: 113 MMHG

## 2020-12-17 DIAGNOSIS — E78.2 MIXED HYPERLIPIDEMIA: ICD-10-CM

## 2020-12-17 DIAGNOSIS — E66.09 CLASS 1 OBESITY DUE TO EXCESS CALORIES WITHOUT SERIOUS COMORBIDITY WITH BODY MASS INDEX (BMI) OF 30.0 TO 30.9 IN ADULT: ICD-10-CM

## 2020-12-17 DIAGNOSIS — I10 ESSENTIAL HYPERTENSION: ICD-10-CM

## 2020-12-17 DIAGNOSIS — I25.10 CORONARY ARTERY DISEASE INVOLVING NATIVE CORONARY ARTERY OF NATIVE HEART WITHOUT ANGINA PECTORIS: Primary | ICD-10-CM

## 2020-12-17 PROCEDURE — 99213 OFFICE O/P EST LOW 20 MIN: CPT | Performed by: INTERNAL MEDICINE

## 2020-12-17 PROCEDURE — 93000 ELECTROCARDIOGRAM COMPLETE: CPT | Performed by: INTERNAL MEDICINE

## 2020-12-17 NOTE — PROGRESS NOTES
6 month follow up    Subjective:        Armando Dixon Jr. is a 65 y.o. male who here for follow up    CC  Coronary artery disease  HPI  65-year-old male with known history of coronary artery disease with a status post angioplasty, along with a history of the hypertension and hyperlipidemia here for the follow-up denies any chest pains tightness heaviness or the pressure sensation     Problems Addressed this Visit        Cardiovascular and Mediastinum    Essential hypertension    Mixed hyperlipidemia    Coronary artery disease involving native coronary artery of native heart without angina pectoris - Primary       Digestive    Class 1 obesity due to excess calories without serious comorbidity with body mass index (BMI) of 30.0 to 30.9 in adult      Diagnoses       Codes Comments    Coronary artery disease involving native coronary artery of native heart without angina pectoris    -  Primary ICD-10-CM: I25.10  ICD-9-CM: 414.01     Essential hypertension     ICD-10-CM: I10  ICD-9-CM: 401.9     Mixed hyperlipidemia     ICD-10-CM: E78.2  ICD-9-CM: 272.2     Class 1 obesity due to excess calories without serious comorbidity with body mass index (BMI) of 30.0 to 30.9 in adult     ICD-10-CM: E66.09, Z68.30  ICD-9-CM: 278.00, V85.30         .Conclusion       · Successful right and left coronary angiogram and LV gram  · Successful angioplasty and stent to the mid 90% x 2 lesions and the diagonal branch ostial 80% reduced to 0% with 2.5/33 and 2.75/8 Xience stent in LAD, diagonal branch was jailed but was dilated with 2.5/12 trek balloon  · Normal left main  · Left anterior descending long diffuse disease with a midportion consequence 90% stenosis reduced to 0% with 2.5/33 and 2.75/8 Xience stent  · Ostial diagonal branch jailed and was dilated with a 2.5/12 trek balloon  · Circumflex artery nondominant with OM ostial 60% stenosis  · Right coronary artery dominant posterolateral branch 50% stenosis  · Normal LV gram  "        The following portions of the patient's history were reviewed and updated as appropriate: allergies, current medications, past family history, past medical history, past social history, past surgical history and problem list.    Past Medical History:   Diagnosis Date   • Allergic    • Anxiety    • Arthritis    • Asthma    • Chronic kidney disease     chronic stones  has had 4 eposodes,no renal failure   • Coronary artery disease    • Depression    • Hypertension    • ENRRIQUE on CPAP    • Osteoporosis    • Prostatitis    • Seasonal allergies      reports that he has never smoked. He has never used smokeless tobacco. He reports current alcohol use. He reports that he does not use drugs.   Family History   Problem Relation Age of Onset   • Hypertension Mother    • Arrhythmia Mother    • Hyperlipidemia Neg Hx    • Heart disease Neg Hx    • Heart failure Neg Hx    • Heart attack Neg Hx        Review of Systems  Constitutional: No wt loss, fever, fatigue  Gastrointestinal: No nausea, abdominal pain  Behavioral/Psych: No insomnia or anxiety   Cardiovascular no chest pains or tightness in the chest  Objective:       Physical Exam    /68 (BP Location: Right arm)   Pulse 70   Ht 167.6 cm (66\")   Wt 125 kg (275 lb)   BMI 44.39 kg/m²   General appearance: No acute changes   Neck: Trachea midline; NECK, supple, no thyromegaly or lymphadenopathy   Lungs: Normal size and shape, normal breath sounds, equal distribution of air, no rales and rhonchi   CV: S1-S2 regular, no murmurs, no rub, no gallop   Abdomen: Soft, non-tender; no masses , no abnormal abdominal sounds   Extremities: No deformity , normal color , no peripheral edema   Skin: Normal temperature, turgor and texture; no rash, ulcers            ECG 12 Lead    Date/Time: 12/17/2020 3:11 PM  Performed by: Minerva Adams MD  Authorized by: Minerva Adams MD   Comparison: compared with previous ECG   Similar to previous ECG  Rhythm: sinus " rhythm  ST Flattening: all    Clinical impression: non-specific ECG            Ref Range & Units 2d ago 6mo ago 11mo ago   Total Cholesterol   0 - 200 mg/dL 118  113  151 R, CM    Triglycerides   0 - 150 mg/dL 68  58  82 R, CM    HDL Cholesterol   40 - 60 mg/dL 59  62High   50 R    LDL Cholesterol    0 - 100 mg/dL 45  39  85 CM    VLDL Cholesterol   5 - 40 mg/dL 14  11.6  16      Glucose   65 - 99 mg/dL 115High     BUN   8 - 23 mg/dL 23    Creatinine   0.76 - 1.27 mg/dL 1.03    Sodium   136 - 145 mmol/L 139    Potassium   3.5 - 5.2 mmol/L 4.6    Chloride   98 - 107 mmol/L 101    CO2   22.0 - 29.0 mmol/L 27.9    Calcium   8.6 - 10.5 mg/dL 9.4    Total Protein   6.0 - 8.5 g/dL 6.8    Albumin   3.50 - 5.20 g/dL 4.20    ALT (SGPT)   1 - 41 U/L 15    AST (SGOT)   1 - 40 U/L 18    Alkaline Phosphatase   39 - 117 U/L 106    Total Bilirubin   0.0 - 1.2 mg/dL 0.4    eGFR Non African Amer   >60 mL/min/1.73 73    Globulin   gm/dL 2.6          Echocardiogram:        Current Outpatient Medications:   •  acetaminophen (TYLENOL) 650 MG 8 hr tablet, Take 650 mg by mouth Every 8 (Eight) Hours As Needed for Mild Pain ., Disp: , Rfl:   •  alfuzosin (UROXATRAL) 10 MG 24 hr tablet, Take 10 mg by mouth Daily., Disp: , Rfl:   •  aspirin 81 MG chewable tablet, Chew 1 tablet Daily., Disp: 30 tablet, Rfl: 11  •  atorvastatin (LIPITOR) 20 MG tablet, Take 1 tablet by mouth Daily., Disp: 30 tablet, Rfl: 11  •  DULoxetine (CYMBALTA) 60 MG capsule, Take 60 mg by mouth 2 (Two) Times a Day., Disp: , Rfl:   •  gabapentin (NEURONTIN) 300 MG capsule, 300 mg 3 (Three) Times a Day., Disp: , Rfl:   •  HYDROcodone-acetaminophen (NORCO)  MG per tablet, Take 1 tablet by mouth Every 6 (Six) Hours As Needed., Disp: , Rfl:   •  lamoTRIgine (LaMICtal) 200 MG tablet, Take 200 mg by mouth Daily., Disp: , Rfl:   •  metoprolol tartrate (LOPRESSOR) 25 MG tablet, Take 0.5 tablets by mouth 2 (Two) Times a Day., Disp: 30 tablet, Rfl: 11  •  montelukast  (SINGULAIR) 10 MG tablet, Take 10 mg by mouth Every Night., Disp: , Rfl:   •  nitroglycerin (NITROSTAT) 0.4 MG SL tablet, 1 under the tongue as needed for angina, may repeat q5mins for up three doses, Disp: 25 tablet, Rfl: 3  •  omeprazole (priLOSEC) 40 MG capsule, Take 40 mg by mouth 2 (Two) Times a Day., Disp: , Rfl:   •  ticagrelor (BRILINTA) 90 MG tablet tablet, Take 1 tablet by mouth 2 (Two) Times a Day., Disp: 60 tablet, Rfl: 11  •  triamterene-hydrochlorothiazide (DYAZIDE) 37.5-25 MG per capsule, TAKE ONE CAPSULE BY MOUTH EVERY MORNING, Disp: 30 capsule, Rfl: 3  •  XIIDRA 5 % solution, , Disp: , Rfl:    Assessment:        Patient Active Problem List   Diagnosis   • Essential hypertension   • Class 1 obesity due to excess calories without serious comorbidity with body mass index (BMI) of 30.0 to 30.9 in adult   • Mixed hyperlipidemia   • Abnormal cardiac function test   • Coronary artery disease involving native coronary artery of native heart without angina pectoris               Plan:            ICD-10-CM ICD-9-CM   1. Coronary artery disease involving native coronary artery of native heart without angina pectoris  I25.10 414.01   2. Essential hypertension  I10 401.9   3. Mixed hyperlipidemia  E78.2 272.2   4. Class 1 obesity due to excess calories without serious comorbidity with body mass index (BMI) of 30.0 to 30.9 in adult  E66.09 278.00    Z68.30 V85.30     1. Coronary artery disease involving native coronary artery of native heart without angina pectoris  Considering the patient's symptoms as well as clinical situation and  EKG findings, along with cardiac risk factors, ischemic workup is necessary to rule out ischemic cardiomyopathy, stress induced arrhythmias, and functional capacity for diagnosis as well as prognostic consideration    - Treadmill Stress Test  - ECG 12 Lead    2. Essential hypertension  Blood pressure under control  - Treadmill Stress Test    3. Mixed hyperlipidemia  Continue current  medication  - Treadmill Stress Test    4. Class 1 obesity due to excess calories without serious comorbidity with body mass index (BMI) of 30.0 to 30.9 in adult  Counseling has been done  - Treadmill Stress Test       Stop brillinta in feb    ETT if ok see in 1 yr  COUNSELING:    Armando Dixon Jr.Counseling was given to patient for the following topics: diagnostic results, risk factor reductions, impressions, risks and benefits of treatment options and importance of treatment compliance .       SMOKING COUNSELING:    [unfilled]    Dictated using Dragon dictation

## 2021-01-08 ENCOUNTER — HOSPITAL ENCOUNTER (OUTPATIENT)
Dept: CARDIOLOGY | Facility: HOSPITAL | Age: 66
Discharge: HOME | End: 2021-01-08
Admitting: INTERNAL MEDICINE

## 2021-01-08 VITALS — DIASTOLIC BLOOD PRESSURE: 64 MMHG | HEART RATE: 70 BPM | SYSTOLIC BLOOD PRESSURE: 140 MMHG

## 2021-01-08 PROCEDURE — 93018 CV STRESS TEST I&R ONLY: CPT | Performed by: INTERNAL MEDICINE

## 2021-01-08 PROCEDURE — 93017 CV STRESS TEST TRACING ONLY: CPT

## 2021-01-08 PROCEDURE — 93016 CV STRESS TEST SUPVJ ONLY: CPT | Performed by: NURSE PRACTITIONER

## 2021-01-12 ENCOUNTER — TELEPHONE (OUTPATIENT)
Dept: CARDIOLOGY | Facility: CLINIC | Age: 66
End: 2021-01-12

## 2021-01-12 LAB
BH CV STRESS BP STAGE 1: NORMAL
BH CV STRESS DURATION MIN STAGE 1: 3
BH CV STRESS DURATION MIN STAGE 2: 1
BH CV STRESS DURATION SEC STAGE 1: 0
BH CV STRESS DURATION SEC STAGE 2: 47
BH CV STRESS GRADE STAGE 1: 10
BH CV STRESS GRADE STAGE 2: 12
BH CV STRESS HR STAGE 1: 122
BH CV STRESS HR STAGE 2: 132
BH CV STRESS METS STAGE 1: 4.6
BH CV STRESS METS STAGE 2: 7.5
BH CV STRESS PROTOCOL 1: NORMAL
BH CV STRESS RECOVERY BP: NORMAL MMHG
BH CV STRESS RECOVERY HR: 92 BPM
BH CV STRESS SPEED STAGE 1: 1.7
BH CV STRESS SPEED STAGE 2: 2.2
BH CV STRESS STAGE 1: 1
BH CV STRESS STAGE 2: 2
MAXIMAL PREDICTED HEART RATE: 156 BPM
PERCENT MAX PREDICTED HR: 85.26 %
STRESS BASELINE BP: NORMAL MMHG
STRESS BASELINE HR: 71 BPM
STRESS PERCENT HR: 100 %
STRESS POST ESTIMATED WORKLOAD: 5.7 METS
STRESS POST EXERCISE DUR MIN: 4 MIN
STRESS POST EXERCISE DUR SEC: 47 SEC
STRESS POST PEAK BP: NORMAL MMHG
STRESS POST PEAK HR: 133 BPM
STRESS TARGET HR: 133 BPM

## 2021-01-12 NOTE — TELEPHONE ENCOUNTER
Specificity and sensitivity of the stress test/ cardiac workup has been explained. Pt has been explained if  Symptoms continue please go to ER, and further w/p will be required.    Also explained this does not rule out coronary artery disease or the future events, continue to emphasize on risk reductions for coronary artery disease    Notified patient of results, verbalized understanding.

## 2021-03-08 RX ORDER — TRIAMTERENE AND HYDROCHLOROTHIAZIDE 37.5; 25 MG/1; MG/1
CAPSULE ORAL
Qty: 30 CAPSULE | Refills: 2 | Status: SHIPPED | OUTPATIENT
Start: 2021-03-08 | End: 2021-06-25

## 2021-03-11 RX ORDER — ATORVASTATIN CALCIUM 20 MG/1
20 TABLET, FILM COATED ORAL DAILY
Qty: 30 TABLET | Refills: 2 | Status: SHIPPED | OUTPATIENT
Start: 2021-03-11 | End: 2021-06-16

## 2021-03-19 ENCOUNTER — BULK ORDERING (OUTPATIENT)
Dept: CASE MANAGEMENT | Facility: OTHER | Age: 66
End: 2021-03-19

## 2021-03-19 DIAGNOSIS — Z23 IMMUNIZATION DUE: ICD-10-CM

## 2021-03-26 RX ORDER — TICAGRELOR 90 MG/1
TABLET ORAL
Qty: 60 TABLET | Refills: 10 | OUTPATIENT
Start: 2021-03-26

## 2021-06-16 RX ORDER — ATORVASTATIN CALCIUM 20 MG/1
TABLET, FILM COATED ORAL
Qty: 90 TABLET | Refills: 1 | Status: SHIPPED | OUTPATIENT
Start: 2021-06-16 | End: 2021-12-15

## 2021-06-28 RX ORDER — TRIAMTERENE AND HYDROCHLOROTHIAZIDE 37.5; 25 MG/1; MG/1
CAPSULE ORAL
Qty: 30 CAPSULE | Refills: 5 | Status: SHIPPED | OUTPATIENT
Start: 2021-06-28 | End: 2022-01-06

## 2021-08-16 ENCOUNTER — OFFICE VISIT (OUTPATIENT)
Dept: CARDIOLOGY | Facility: CLINIC | Age: 66
End: 2021-08-16

## 2021-08-16 VITALS
WEIGHT: 290 LBS | BODY MASS INDEX: 46.61 KG/M2 | SYSTOLIC BLOOD PRESSURE: 129 MMHG | DIASTOLIC BLOOD PRESSURE: 82 MMHG | HEIGHT: 66 IN | HEART RATE: 81 BPM

## 2021-08-16 DIAGNOSIS — E66.09 CLASS 1 OBESITY DUE TO EXCESS CALORIES WITHOUT SERIOUS COMORBIDITY WITH BODY MASS INDEX (BMI) OF 30.0 TO 30.9 IN ADULT: ICD-10-CM

## 2021-08-16 DIAGNOSIS — E78.2 MIXED HYPERLIPIDEMIA: ICD-10-CM

## 2021-08-16 DIAGNOSIS — I25.10 CORONARY ARTERY DISEASE INVOLVING NATIVE CORONARY ARTERY OF NATIVE HEART WITHOUT ANGINA PECTORIS: ICD-10-CM

## 2021-08-16 DIAGNOSIS — R07.2 PRECORDIAL PAIN: Primary | ICD-10-CM

## 2021-08-16 DIAGNOSIS — I10 ESSENTIAL HYPERTENSION: ICD-10-CM

## 2021-08-16 PROCEDURE — 99213 OFFICE O/P EST LOW 20 MIN: CPT | Performed by: INTERNAL MEDICINE

## 2021-08-16 PROCEDURE — 93000 ELECTROCARDIOGRAM COMPLETE: CPT | Performed by: INTERNAL MEDICINE

## 2021-08-16 RX ORDER — DICLOFENAC POTASSIUM 50 MG/1
TABLET, FILM COATED ORAL
COMMUNITY
Start: 2021-07-19

## 2021-08-16 RX ORDER — NITROGLYCERIN 0.4 MG/1
TABLET SUBLINGUAL
Qty: 25 TABLET | Refills: 3 | Status: SHIPPED | OUTPATIENT
Start: 2021-08-16 | End: 2021-08-16 | Stop reason: SDUPTHER

## 2021-08-16 RX ORDER — NITROGLYCERIN 0.4 MG/1
TABLET SUBLINGUAL
Qty: 25 TABLET | Refills: 3 | Status: SHIPPED | OUTPATIENT
Start: 2021-08-16

## 2021-08-16 NOTE — PROGRESS NOTES
Follow up     Chest pain, legs and feet swelling    Subjective:        Armando Dixon Jr. is a 66 y.o. male who here for follow up    CC  Cp with stress  HPI  66-year-old male with known history of coronary artery disease obesity benign essential arterial hypertension has been complaining of the tightness in the chest as the patient has been under significant stress pains are worse with stress but no chest pains while the patient is exercising patient also has history of esophageal gastritis     Problems Addressed this Visit        Cardiac and Vasculature    Essential hypertension    Mixed hyperlipidemia    Coronary artery disease involving native coronary artery of native heart without angina pectoris       Endocrine and Metabolic    Class 1 obesity due to excess calories without serious comorbidity with body mass index (BMI) of 30.0 to 30.9 in adult      Other Visit Diagnoses     Precordial pain    -  Primary      Diagnoses       Codes Comments    Precordial pain    -  Primary ICD-10-CM: R07.2  ICD-9-CM: 786.51     Class 1 obesity due to excess calories without serious comorbidity with body mass index (BMI) of 30.0 to 30.9 in adult     ICD-10-CM: E66.09, Z68.30  ICD-9-CM: 278.00, V85.30     Essential hypertension     ICD-10-CM: I10  ICD-9-CM: 401.9     Mixed hyperlipidemia     ICD-10-CM: E78.2  ICD-9-CM: 272.2     Coronary artery disease involving native coronary artery of native heart without angina pectoris     ICD-10-CM: I25.10  ICD-9-CM: 414.01         .    The following portions of the patient's history were reviewed and updated as appropriate: allergies, current medications, past family history, past medical history, past social history, past surgical history and problem list.    Past Medical History:   Diagnosis Date   • Allergic    • Anxiety    • Arthritis    • Asthma    • Chronic kidney disease     chronic stones  has had 4 eposodes,no renal failure   • Coronary artery disease    • Depression    •  "Hypertension    • ENRRIQUE on CPAP    • Osteoporosis    • Prostatitis    • Seasonal allergies      reports that he has never smoked. He has never used smokeless tobacco. He reports current alcohol use. He reports that he does not use drugs.   Family History   Problem Relation Age of Onset   • Hypertension Mother    • Arrhythmia Mother    • Hyperlipidemia Neg Hx    • Heart disease Neg Hx    • Heart failure Neg Hx    • Heart attack Neg Hx        Review of Systems  Constitutional: No wt loss, fever, fatigue  Gastrointestinal: No nausea, abdominal pain  Behavioral/Psych: No insomnia or anxiety   Cardiovascular chest pain with stress  Objective:       Physical Exam  /82   Pulse 81   Ht 167.6 cm (66\")   Wt 132 kg (290 lb)   BMI 46.81 kg/m²   General appearance: No acute changes   Neck: Trachea midline; NECK, supple, no thyromegaly or lymphadenopathy   Lungs: Normal size and shape, normal breath sounds, equal distribution of air, no rales and rhonchi   CV: S1-S2 regular, no murmurs, no rub, no gallop   Abdomen: Soft, non-tender; no masses , no abnormal abdominal sounds   Extremities: No deformity , normal color , no peripheral edema   Skin: Normal temperature, turgor and texture; no rash, ulcers            ECG 12 Lead    Date/Time: 8/16/2021 11:29 AM  Performed by: Minerva Adams MD  Authorized by: Minerva Adams MD   Comparison: compared with previous ECG   Similar to previous ECG  Rhythm: sinus rhythm  ST Flattening: all    Clinical impression: non-specific ECG              Echocardiogram:        Current Outpatient Medications:   •  alfuzosin (UROXATRAL) 10 MG 24 hr tablet, Take 10 mg by mouth Daily., Disp: , Rfl:   •  aspirin 81 MG chewable tablet, Chew 1 tablet Daily., Disp: 30 tablet, Rfl: 11  •  atorvastatin (LIPITOR) 20 MG tablet, TAKE ONE TABLET BY MOUTH DAILY, Disp: 90 tablet, Rfl: 1  •  diclofenac (CATAFLAM) 50 MG tablet, , Disp: , Rfl:   •  DULoxetine (CYMBALTA) 60 MG capsule, Take 60 mg by " mouth 2 (Two) Times a Day., Disp: , Rfl:   •  gabapentin (NEURONTIN) 300 MG capsule, 300 mg 3 (Three) Times a Day., Disp: , Rfl:   •  HYDROcodone-acetaminophen (NORCO)  MG per tablet, Take 1 tablet by mouth Every 6 (Six) Hours As Needed., Disp: , Rfl:   •  lamoTRIgine (LaMICtal) 200 MG tablet, Take 200 mg by mouth Daily., Disp: , Rfl:   •  metoprolol tartrate (LOPRESSOR) 25 MG tablet, Take 0.5 tablets by mouth 2 (Two) Times a Day., Disp: 30 tablet, Rfl: 9  •  montelukast (SINGULAIR) 10 MG tablet, Take 10 mg by mouth Every Night., Disp: , Rfl:   •  nitroglycerin (NITROSTAT) 0.4 MG SL tablet, 1 under the tongue as needed for angina, may repeat q5mins for up three doses, Disp: 25 tablet, Rfl: 3  •  omeprazole (priLOSEC) 40 MG capsule, Take 40 mg by mouth 2 (Two) Times a Day., Disp: , Rfl:   •  triamterene-hydrochlorothiazide (DYAZIDE) 37.5-25 MG per capsule, TAKE ONE CAPSULE BY MOUTH EVERY MORNING, Disp: 30 capsule, Rfl: 5   Assessment:        Patient Active Problem List   Diagnosis   • Essential hypertension   • Class 1 obesity due to excess calories without serious comorbidity with body mass index (BMI) of 30.0 to 30.9 in adult   • Mixed hyperlipidemia   • Abnormal cardiac function test   • Coronary artery disease involving native coronary artery of native heart without angina pectoris               Plan:            ICD-10-CM ICD-9-CM   1. Precordial pain  R07.2 786.51   2. Class 1 obesity due to excess calories without serious comorbidity with body mass index (BMI) of 30.0 to 30.9 in adult  E66.09 278.00    Z68.30 V85.30   3. Essential hypertension  I10 401.9   4. Mixed hyperlipidemia  E78.2 272.2   5. Coronary artery disease involving native coronary artery of native heart without angina pectoris  I25.10 414.01     1. Precordial pain  The pains are atypical patient had recently stress test done which was negative no further work-up is needed    2. Class 1 obesity due to excess calories without serious  comorbidity with body mass index (BMI) of 30.0 to 30.9 in adult  Counseling has been done    3. Essential hypertension  Blood pressure under control    4. Mixed hyperlipidemia  Continue current treatment    5. Coronary artery disease involving native coronary artery of native heart without angina pectoris  No angina pectoris       See in 6 months  Atypical cp, pt has no cp with exercise  Pt has esophagitis  COUNSELING:    Armando Dixon Jr.Counseling was given to patient for the following topics: diagnostic results, risk factor reductions, impressions, risks and benefits of treatment options and importance of treatment compliance .       SMOKING COUNSELING:    [unfilled]    Dictated using Dragon dictation

## 2021-12-15 RX ORDER — ATORVASTATIN CALCIUM 20 MG/1
TABLET, FILM COATED ORAL
Qty: 90 TABLET | Refills: 1 | Status: SHIPPED | OUTPATIENT
Start: 2021-12-15 | End: 2022-06-10

## 2022-01-06 RX ORDER — TRIAMTERENE AND HYDROCHLOROTHIAZIDE 37.5; 25 MG/1; MG/1
CAPSULE ORAL
Qty: 30 CAPSULE | Refills: 3 | Status: SHIPPED | OUTPATIENT
Start: 2022-01-06 | End: 2022-05-18

## 2022-01-19 NOTE — TELEPHONE ENCOUNTER
Rx Refill Note  Requested Prescriptions     Pending Prescriptions Disp Refills   • metoprolol tartrate (LOPRESSOR) 25 MG tablet [Pharmacy Med Name: METOPROLOL TARTRATE 25 MG TAB] 30 tablet 7     Sig: TAKE 1/2 TABLET BY MOUTH TWICE A DAY      Last office visit with prescribing clinician: 8/16/2021      Next office visit with prescribing clinician: Visit date not found            Salma Cruz, [facility]  01/19/22, 15:13 EST

## 2022-05-18 ENCOUNTER — TELEPHONE (OUTPATIENT)
Dept: CARDIOLOGY | Facility: CLINIC | Age: 67
End: 2022-05-18

## 2022-05-18 RX ORDER — TRIAMTERENE AND HYDROCHLOROTHIAZIDE 37.5; 25 MG/1; MG/1
CAPSULE ORAL
Qty: 30 CAPSULE | Refills: 3 | Status: SHIPPED | OUTPATIENT
Start: 2022-05-18 | End: 2022-09-16

## 2022-05-18 NOTE — TELEPHONE ENCOUNTER
----- Message from Mirian Melgoza sent at 5/18/2022 10:01 AM EDT -----  Regarding: HR question  Wants to know the highest his HR should be while cutting grass before stopping? His HR reaches 150 BMP before he usually stops, is this OK?    Spoke with the patient he does take breaks when mowing but also has shortness of breath and high heart rate. Advise patient to come in for more evaluation.

## 2022-06-02 ENCOUNTER — OFFICE VISIT (OUTPATIENT)
Dept: CARDIOLOGY | Facility: CLINIC | Age: 67
End: 2022-06-02

## 2022-06-02 VITALS
WEIGHT: 305 LBS | SYSTOLIC BLOOD PRESSURE: 158 MMHG | BODY MASS INDEX: 49.02 KG/M2 | HEIGHT: 66 IN | DIASTOLIC BLOOD PRESSURE: 87 MMHG | HEART RATE: 67 BPM

## 2022-06-02 DIAGNOSIS — I10 PRIMARY HYPERTENSION: ICD-10-CM

## 2022-06-02 DIAGNOSIS — I10 ESSENTIAL HYPERTENSION: ICD-10-CM

## 2022-06-02 DIAGNOSIS — E78.2 MIXED HYPERLIPIDEMIA: ICD-10-CM

## 2022-06-02 DIAGNOSIS — E66.09 CLASS 1 OBESITY DUE TO EXCESS CALORIES WITHOUT SERIOUS COMORBIDITY WITH BODY MASS INDEX (BMI) OF 30.0 TO 30.9 IN ADULT: ICD-10-CM

## 2022-06-02 DIAGNOSIS — R06.02 SHORTNESS OF BREATH: ICD-10-CM

## 2022-06-02 DIAGNOSIS — I25.10 CORONARY ARTERY DISEASE INVOLVING NATIVE CORONARY ARTERY OF NATIVE HEART WITHOUT ANGINA PECTORIS: Primary | ICD-10-CM

## 2022-06-02 PROCEDURE — 99214 OFFICE O/P EST MOD 30 MIN: CPT | Performed by: INTERNAL MEDICINE

## 2022-06-02 PROCEDURE — 93000 ELECTROCARDIOGRAM COMPLETE: CPT | Performed by: INTERNAL MEDICINE

## 2022-06-02 NOTE — PROGRESS NOTES
1 yr follow up   High heart rate with shortness of breath    Subjective:        Armando Dixon Jr. is a 66 y.o. male who here for follow up    CC  Cutting grass hr 150 and sob  Leg discoloration  Leg swelling  cad  HPI  66-year-old male with known history of the coronary artery disease hyperlipidemia hypertension complaining of the shortness of breath also complains of leg discoloration and leg swelling     Problems Addressed this Visit        Cardiac and Vasculature    Essential hypertension    Relevant Orders    Adult Transthoracic Echo Complete W/ Cont if Necessary Per Protocol    Treadmill Stress Test    Mixed hyperlipidemia    Coronary artery disease involving native coronary artery of native heart without angina pectoris - Primary    Relevant Orders    Adult Transthoracic Echo Complete W/ Cont if Necessary Per Protocol    Treadmill Stress Test       Endocrine and Metabolic    Class 1 obesity due to excess calories without serious comorbidity with body mass index (BMI) of 30.0 to 30.9 in adult      Other Visit Diagnoses     Primary hypertension        Relevant Orders    Adult Transthoracic Echo Complete W/ Cont if Necessary Per Protocol    Treadmill Stress Test    Shortness of breath        Relevant Orders    Adult Transthoracic Echo Complete W/ Cont if Necessary Per Protocol    Treadmill Stress Test      Diagnoses       Codes Comments    Coronary artery disease involving native coronary artery of native heart without angina pectoris    -  Primary ICD-10-CM: I25.10  ICD-9-CM: 414.01     Essential hypertension     ICD-10-CM: I10  ICD-9-CM: 401.9     Primary hypertension     ICD-10-CM: I10  ICD-9-CM: 401.9     Shortness of breath     ICD-10-CM: R06.02  ICD-9-CM: 786.05     Class 1 obesity due to excess calories without serious comorbidity with body mass index (BMI) of 30.0 to 30.9 in adult     ICD-10-CM: E66.09, Z68.30  ICD-9-CM: 278.00, V85.30     Mixed hyperlipidemia     ICD-10-CM: E78.2  ICD-9-CM: 272.2      "    .    The following portions of the patient's history were reviewed and updated as appropriate: allergies, current medications, past family history, past medical history, past social history, past surgical history and problem list.    Past Medical History:   Diagnosis Date   • Allergic    • Anxiety    • Arthritis    • Asthma    • Chronic kidney disease     chronic stones  has had 4 eposodes,no renal failure   • Coronary artery disease    • Depression    • Hypertension    • ENRRIQUE on CPAP    • Osteoporosis    • Prostatitis    • Seasonal allergies      reports that he has never smoked. He has never used smokeless tobacco. He reports current alcohol use. He reports that he does not use drugs.   Family History   Problem Relation Age of Onset   • Hypertension Mother    • Arrhythmia Mother    • Hyperlipidemia Neg Hx    • Heart disease Neg Hx    • Heart failure Neg Hx    • Heart attack Neg Hx        Review of Systems  Constitutional: No wt loss, fever, fatigue  Gastrointestinal: No nausea, abdominal pain  Behavioral/Psych: No insomnia or anxiety   Cardiovascular shortness of breath  Objective:       Physical Exam  /87   Pulse 67   Ht 167.6 cm (66\")   Wt (!) 138 kg (305 lb)   BMI 49.23 kg/m²   General appearance: No acute changes   Neck: Trachea midline; NECK, supple, no thyromegaly or lymphadenopathy   Lungs: Normal size and shape, normal breath sounds, equal distribution of air, no rales and rhonchi   CV: S1-S2 regular, no murmurs, no rub, no gallop   Abdomen: Soft, nontender; no masses , no abnormal abdominal sounds   Extremities: No deformity , normal color , no peripheral edema   Skin: Normal temperature, turgor and texture; no rash, ulcers            ECG 12 Lead    Date/Time: 6/2/2022 12:13 PM  Performed by: Minerva Adams MD  Authorized by: Minerva Adams MD   Comparison: compared with previous ECG   Similar to previous ECG  Rhythm: sinus rhythm  ST Flattening: all    Clinical impression: " non-specific ECG              Echocardiogram:        Current Outpatient Medications:   •  alfuzosin (UROXATRAL) 10 MG 24 hr tablet, Take 10 mg by mouth Daily., Disp: , Rfl:   •  aspirin 81 MG chewable tablet, Chew 1 tablet Daily., Disp: 30 tablet, Rfl: 11  •  atorvastatin (LIPITOR) 20 MG tablet, TAKE ONE TABLET BY MOUTH DAILY, Disp: 90 tablet, Rfl: 1  •  diclofenac (CATAFLAM) 50 MG tablet, , Disp: , Rfl:   •  DULoxetine (CYMBALTA) 60 MG capsule, Take 60 mg by mouth 2 (Two) Times a Day., Disp: , Rfl:   •  gabapentin (NEURONTIN) 300 MG capsule, 300 mg 3 (Three) Times a Day., Disp: , Rfl:   •  HYDROcodone-acetaminophen (NORCO)  MG per tablet, Take 1 tablet by mouth Every 6 (Six) Hours As Needed., Disp: , Rfl:   •  lamoTRIgine (LaMICtal) 200 MG tablet, Take 200 mg by mouth Daily., Disp: , Rfl:   •  metoprolol tartrate (LOPRESSOR) 25 MG tablet, TAKE 1/2 TABLET BY MOUTH TWICE A DAY, Disp: 30 tablet, Rfl: 7  •  montelukast (SINGULAIR) 10 MG tablet, Take 10 mg by mouth Every Night., Disp: , Rfl:   •  nitroglycerin (NITROSTAT) 0.4 MG SL tablet, 1 under the tongue as needed for angina, may repeat q5mins for up three doses, Disp: 25 tablet, Rfl: 3  •  omeprazole (priLOSEC) 40 MG capsule, Take 40 mg by mouth 2 (Two) Times a Day., Disp: , Rfl:   •  triamterene-hydrochlorothiazide (DYAZIDE) 37.5-25 MG per capsule, TAKE ONE CAPSULE BY MOUTH EVERY MORNING, Disp: 30 capsule, Rfl: 3   Assessment:        Patient Active Problem List   Diagnosis   • Essential hypertension   • Class 1 obesity due to excess calories without serious comorbidity with body mass index (BMI) of 30.0 to 30.9 in adult   • Mixed hyperlipidemia   • Abnormal cardiac function test   • Coronary artery disease involving native coronary artery of native heart without angina pectoris               Plan:            ICD-10-CM ICD-9-CM   1. Coronary artery disease involving native coronary artery of native heart without angina pectoris  I25.10 414.01   2. Essential  hypertension  I10 401.9   3. Primary hypertension  I10 401.9   4. Shortness of breath  R06.02 786.05   5. Class 1 obesity due to excess calories without serious comorbidity with body mass index (BMI) of 30.0 to 30.9 in adult  E66.09 278.00    Z68.30 V85.30   6. Mixed hyperlipidemia  E78.2 272.2     1. Coronary artery disease involving native coronary artery of native heart without angina pectoris  Considering the patient's symptoms as well as clinical situation and  EKG findings, along with cardiac risk factors, ischemic workup is necessary to rule out ischemic cardiomyopathy, stress induced arrhythmias, and functional capacity for diagnosis as well as prognostic consideration    - Adult Transthoracic Echo Complete W/ Cont if Necessary Per Protocol  - Treadmill Stress Test    2. Essential hypertension  Blood pressure continue current treatment  - Adult Transthoracic Echo Complete W/ Cont if Necessary Per Protocol  - Treadmill Stress Test    3. Primary hypertension  Continue current treatment  - Adult Transthoracic Echo Complete W/ Cont if Necessary Per Protocol  - Treadmill Stress Test    4. Shortness of breath  Considering patient's medical condition as well as the risk factors, patient will require echocardiogram for further evaluation for the LV function, four-chamber evaluation, including the pressures, valvular function and  pericardial disease and pericardial effusion    - Adult Transthoracic Echo Complete W/ Cont if Necessary Per Protocol  - Treadmill Stress Test    5. Class 1 obesity due to excess calories without serious comorbidity with body mass index (BMI) of 30.0 to 30.9 in adult  Significant risk of obesity to CAD, HTN has been explained  Advantages of wt reduction has been explained      6. Mixed hyperlipidemia  Continue current treatment       Ett, echo  COUNSELING:    Armando Dixon Jr.Counseling was given to patient for the following topics: diagnostic results, risk factor reductions,  impressions, risks and benefits of treatment options and importance of treatment compliance .       SMOKING COUNSELING:    [unfilled]    Dictated using Dragon dictation

## 2022-06-10 RX ORDER — ATORVASTATIN CALCIUM 20 MG/1
TABLET, FILM COATED ORAL
Qty: 90 TABLET | Refills: 1 | Status: SHIPPED | OUTPATIENT
Start: 2022-06-10 | End: 2022-12-09

## 2022-06-22 ENCOUNTER — HOSPITAL ENCOUNTER (OUTPATIENT)
Dept: CARDIOLOGY | Facility: HOSPITAL | Age: 67
Discharge: HOME | End: 2022-06-22
Admitting: INTERNAL MEDICINE

## 2022-06-22 VITALS
HEIGHT: 66 IN | OXYGEN SATURATION: 98 % | DIASTOLIC BLOOD PRESSURE: 80 MMHG | SYSTOLIC BLOOD PRESSURE: 128 MMHG | WEIGHT: 305 LBS | BODY MASS INDEX: 49.02 KG/M2 | HEART RATE: 93 BPM

## 2022-06-22 PROCEDURE — 93018 CV STRESS TEST I&R ONLY: CPT | Performed by: INTERNAL MEDICINE

## 2022-06-22 PROCEDURE — 93017 CV STRESS TEST TRACING ONLY: CPT

## 2022-06-22 RX ORDER — CEPHALEXIN 500 MG/1
CAPSULE ORAL
COMMUNITY
Start: 2022-06-15 | End: 2022-07-07

## 2022-06-23 LAB
BH CV IMMEDIATE POST TECH DATA BLOOD PRESSURE: NORMAL MMHG
BH CV IMMEDIATE POST TECH DATA HEART RATE: 127 BPM
BH CV SIX MINUTE RECOVERY TECH DATA BLOOD PRESSURE: NORMAL
BH CV SIX MINUTE RECOVERY TECH DATA HEART RATE: 98 BPM
BH CV STRESS BP STAGE 1: NORMAL
BH CV STRESS BP STAGE 2: NORMAL
BH CV STRESS DURATION MIN STAGE 1: 3
BH CV STRESS DURATION SEC STAGE 1: 3
BH CV STRESS DURATION SEC STAGE 2: 40
BH CV STRESS GRADE STAGE 1: 10
BH CV STRESS GRADE STAGE 2: 12
BH CV STRESS HR STAGE 1: 135
BH CV STRESS HR STAGE 2: 141
BH CV STRESS METS STAGE 1: 3.8
BH CV STRESS METS STAGE 2: 4.5
BH CV STRESS PROTOCOL 1: NORMAL
BH CV STRESS RECOVERY BP: NORMAL MMHG
BH CV STRESS RECOVERY HR: 98 BPM
BH CV STRESS RECOVERY O2: 98 %
BH CV STRESS SPEED STAGE 1: 1.7
BH CV STRESS SPEED STAGE 2: 1.7
BH CV STRESS STAGE 1: 1
BH CV STRESS STAGE 2: 2
BH CV THREE MINUTE POST TECH DATA BLOOD PRESSURE: NORMAL MMHG
BH CV THREE MINUTE POST TECH DATA HEART RATE: 88 BPM
MAXIMAL PREDICTED HEART RATE: 154 BPM
PERCENT MAX PREDICTED HR: 91.56 %
STRESS BASELINE BP: NORMAL MMHG
STRESS BASELINE HR: 93 BPM
STRESS O2 SAT REST: 98 %
STRESS PERCENT HR: 108 %
STRESS POST ESTIMATED WORKLOAD: 4.5 METS
STRESS POST EXERCISE DUR MIN: 3 MIN
STRESS POST EXERCISE DUR SEC: 43 SEC
STRESS POST PEAK BP: NORMAL MMHG
STRESS POST PEAK HR: 141 BPM
STRESS TARGET HR: 131 BPM

## 2022-06-28 ENCOUNTER — HOSPITAL ENCOUNTER (OUTPATIENT)
Dept: CARDIOLOGY | Facility: HOSPITAL | Age: 67
Discharge: HOME | End: 2022-06-28
Admitting: INTERNAL MEDICINE

## 2022-06-28 VITALS
DIASTOLIC BLOOD PRESSURE: 75 MMHG | HEART RATE: 72 BPM | BODY MASS INDEX: 49.02 KG/M2 | SYSTOLIC BLOOD PRESSURE: 136 MMHG | WEIGHT: 305 LBS | HEIGHT: 66 IN

## 2022-06-28 LAB
AORTIC DIMENSIONLESS INDEX: 0.7 (DI)
BH CV ECHO MEAS - ACS: 2.4 CM
BH CV ECHO MEAS - AO MAX PG: 9.4 MMHG
BH CV ECHO MEAS - AO MEAN PG: 5.1 MMHG
BH CV ECHO MEAS - AO ROOT DIAM: 3.7 CM
BH CV ECHO MEAS - AO V2 MAX: 153.4 CM/SEC
BH CV ECHO MEAS - AO V2 VTI: 33 CM
BH CV ECHO MEAS - AVA(I,D): 2.7 CM2
BH CV ECHO MEAS - EDV(CUBED): 116.3 ML
BH CV ECHO MEAS - EDV(MOD-SP2): 106 ML
BH CV ECHO MEAS - EDV(MOD-SP4): 104 ML
BH CV ECHO MEAS - EF(MOD-BP): 59.8 %
BH CV ECHO MEAS - EF(MOD-SP2): 55.7 %
BH CV ECHO MEAS - EF(MOD-SP4): 62.5 %
BH CV ECHO MEAS - ESV(CUBED): 28.1 ML
BH CV ECHO MEAS - ESV(MOD-SP2): 47 ML
BH CV ECHO MEAS - ESV(MOD-SP4): 39 ML
BH CV ECHO MEAS - FS: 37.8 %
BH CV ECHO MEAS - IVS/LVPW: 1.2 CM
BH CV ECHO MEAS - IVSD: 1.1 CM
BH CV ECHO MEAS - LAT PEAK E' VEL: 10.1 CM/SEC
BH CV ECHO MEAS - LV DIASTOLIC VOL/BSA (35-75): 43.5 CM2
BH CV ECHO MEAS - LV MASS(C)D: 176.8 GRAMS
BH CV ECHO MEAS - LV MAX PG: 3.5 MMHG
BH CV ECHO MEAS - LV MEAN PG: 1.94 MMHG
BH CV ECHO MEAS - LV SYSTOLIC VOL/BSA (12-30): 16.3 CM2
BH CV ECHO MEAS - LV V1 MAX: 93.1 CM/SEC
BH CV ECHO MEAS - LV V1 VTI: 21.8 CM
BH CV ECHO MEAS - LVIDD: 4.9 CM
BH CV ECHO MEAS - LVIDS: 3 CM
BH CV ECHO MEAS - LVOT AREA: 4.2 CM2
BH CV ECHO MEAS - LVOT DIAM: 2.3 CM
BH CV ECHO MEAS - LVPWD: 0.92 CM
BH CV ECHO MEAS - MED PEAK E' VEL: 9.4 CM/SEC
BH CV ECHO MEAS - MV A DUR: 0.13 SEC
BH CV ECHO MEAS - MV A MAX VEL: 76.2 CM/SEC
BH CV ECHO MEAS - MV DEC SLOPE: 503.6 CM/SEC2
BH CV ECHO MEAS - MV DEC TIME: 172 MSEC
BH CV ECHO MEAS - MV E MAX VEL: 105.7 CM/SEC
BH CV ECHO MEAS - MV E/A: 1.39
BH CV ECHO MEAS - MV MAX PG: 6.7 MMHG
BH CV ECHO MEAS - MV MEAN PG: 1.75 MMHG
BH CV ECHO MEAS - MV P1/2T: 78.9 MSEC
BH CV ECHO MEAS - MV V2 VTI: 33.8 CM
BH CV ECHO MEAS - MVA(P1/2T): 2.8 CM2
BH CV ECHO MEAS - MVA(VTI): 2.7 CM2
BH CV ECHO MEAS - PA ACC TIME: 0.09 SEC
BH CV ECHO MEAS - PA PR(ACCEL): 38.6 MMHG
BH CV ECHO MEAS - PA V2 MAX: 86.9 CM/SEC
BH CV ECHO MEAS - PULM A REVS DUR: 0.13 SEC
BH CV ECHO MEAS - PULM A REVS VEL: 29.3 CM/SEC
BH CV ECHO MEAS - PULM DIAS VEL: 52.2 CM/SEC
BH CV ECHO MEAS - PULM S/D: 0.93
BH CV ECHO MEAS - PULM SYS VEL: 48.6 CM/SEC
BH CV ECHO MEAS - RV MAX PG: 2.8 MMHG
BH CV ECHO MEAS - RV V1 MAX: 82.9 CM/SEC
BH CV ECHO MEAS - RV V1 VTI: 18.4 CM
BH CV ECHO MEAS - SI(MOD-SP2): 24.7 ML/M2
BH CV ECHO MEAS - SI(MOD-SP4): 27.2 ML/M2
BH CV ECHO MEAS - SV(LVOT): 90.8 ML
BH CV ECHO MEAS - SV(MOD-SP2): 59 ML
BH CV ECHO MEAS - SV(MOD-SP4): 65 ML
BH CV ECHO MEAS - TAPSE (>1.6): 2.47 CM
BH CV ECHO MEASUREMENTS AVERAGE E/E' RATIO: 10.84
BH CV XLRA - RV BASE: 3.9 CM
BH CV XLRA - RV LENGTH: 7.4 CM
BH CV XLRA - RV MID: 3.4 CM
BH CV XLRA - TDI S': 13.5 CM/SEC
LEFT ATRIUM VOLUME INDEX: 22 ML/M2
MAXIMAL PREDICTED HEART RATE: 154 BPM
SINUS: 3.3 CM
STJ: 2.9 CM
STRESS TARGET HR: 131 BPM

## 2022-06-28 PROCEDURE — 93306 TTE W/DOPPLER COMPLETE: CPT

## 2022-06-28 PROCEDURE — 93306 TTE W/DOPPLER COMPLETE: CPT | Performed by: INTERNAL MEDICINE

## 2022-07-07 ENCOUNTER — OFFICE VISIT (OUTPATIENT)
Dept: CARDIOLOGY | Facility: CLINIC | Age: 67
End: 2022-07-07

## 2022-07-07 VITALS
DIASTOLIC BLOOD PRESSURE: 70 MMHG | HEART RATE: 55 BPM | WEIGHT: 313 LBS | HEIGHT: 66 IN | BODY MASS INDEX: 50.3 KG/M2 | SYSTOLIC BLOOD PRESSURE: 136 MMHG

## 2022-07-07 DIAGNOSIS — I10 ESSENTIAL HYPERTENSION: Primary | ICD-10-CM

## 2022-07-07 DIAGNOSIS — E66.09 CLASS 1 OBESITY DUE TO EXCESS CALORIES WITHOUT SERIOUS COMORBIDITY WITH BODY MASS INDEX (BMI) OF 30.0 TO 30.9 IN ADULT: ICD-10-CM

## 2022-07-07 DIAGNOSIS — E78.2 MIXED HYPERLIPIDEMIA: ICD-10-CM

## 2022-07-07 DIAGNOSIS — I25.10 CORONARY ARTERY DISEASE INVOLVING NATIVE CORONARY ARTERY OF NATIVE HEART WITHOUT ANGINA PECTORIS: ICD-10-CM

## 2022-07-07 PROCEDURE — 99214 OFFICE O/P EST MOD 30 MIN: CPT | Performed by: INTERNAL MEDICINE

## 2022-07-07 NOTE — PROGRESS NOTES
TEST RESULTS   Subjective:        Armando Dixon Jr. is a 67 y.o. male who here for follow up    CC  Shortness of breath with grass cutting   HPI  67 years old male with known history of the coronary artery disease, benign essential arterial hypertension and hyperlipidemia complains of symptoms of shortness of breath and the tightness in the chest while cutting the grass     Problems Addressed this Visit        Cardiac and Vasculature    Essential hypertension - Primary    Mixed hyperlipidemia    Coronary artery disease involving native coronary artery of native heart without angina pectoris       Endocrine and Metabolic    Class 1 obesity due to excess calories without serious comorbidity with body mass index (BMI) of 30.0 to 30.9 in adult      Diagnoses       Codes Comments    Essential hypertension    -  Primary ICD-10-CM: I10  ICD-9-CM: 401.9     Mixed hyperlipidemia     ICD-10-CM: E78.2  ICD-9-CM: 272.2     Coronary artery disease involving native coronary artery of native heart without angina pectoris     ICD-10-CM: I25.10  ICD-9-CM: 414.01     Class 1 obesity due to excess calories without serious comorbidity with body mass index (BMI) of 30.0 to 30.9 in adult     ICD-10-CM: E66.09, Z68.30  ICD-9-CM: 278.00, V85.30         .    The following portions of the patient's history were reviewed and updated as appropriate: allergies, current medications, past family history, past medical history, past social history, past surgical history and problem list.    Past Medical History:   Diagnosis Date   • Allergic    • Anxiety    • Arthritis    • Asthma    • Chronic kidney disease     chronic stones  has had 4 eposodes,no renal failure   • Coronary artery disease    • Depression    • Hypertension    • ENRRIQUE on CPAP    • Osteoporosis    • Prostatitis    • Seasonal allergies      reports that he has never smoked. He has never used smokeless tobacco. He reports current alcohol use. He reports that he does not use drugs.  "  Family History   Problem Relation Age of Onset   • Hypertension Mother    • Arrhythmia Mother    • Hyperlipidemia Neg Hx    • Heart disease Neg Hx    • Heart failure Neg Hx    • Heart attack Neg Hx        Review of Systems  Constitutional: No wt loss, fever, fatigue  Gastrointestinal: No nausea, abdominal pain  Behavioral/Psych: No insomnia or anxiety   Cardiovascular shortness of breath and tightness in the chest  Objective:       Physical Exam  /70   Pulse 55   Ht 167.6 cm (66\")   Wt (!) 142 kg (313 lb)   BMI 50.52 kg/m²   General appearance: No acute changes   Neck: Trachea midline; NECK, supple, no thyromegaly or lymphadenopathy   Lungs: Normal size and shape, normal breath sounds, equal distribution of air, no rales and rhonchi   CV: S1-S2 regular, no murmurs, no rub, no gallop   Abdomen: Soft, nontender; no masses , no abnormal abdominal sounds   Extremities: No deformity , normal color , no peripheral edema   Skin: Normal temperature, turgor and texture; no rash, ulcers          Procedures    Interpretation Summary       · The patient reported shortness of breath during the stress test.  · No ECG evidence of myocardial ischemia. Negative clinical evidence of myocardial ischemia. Findings consistent with a normal ECG stress test  Interpretation Summary    · Calculated left ventricular EF = 59.8% Estimated left ventricular EF was in agreement with the calculated left ventricular EF.  · Left ventricular diastolic function was normal.  · There is no evidence of pericardial effusion. .        Echocardiogram:        Current Outpatient Medications:   •  alfuzosin (UROXATRAL) 10 MG 24 hr tablet, Take 10 mg by mouth Daily., Disp: , Rfl:   •  aspirin 81 MG chewable tablet, Chew 1 tablet Daily., Disp: 30 tablet, Rfl: 11  •  atorvastatin (LIPITOR) 20 MG tablet, TAKE ONE TABLET BY MOUTH DAILY, Disp: 90 tablet, Rfl: 1  •  diclofenac (CATAFLAM) 50 MG tablet, , Disp: , Rfl:   •  DULoxetine (CYMBALTA) 60 MG " capsule, Take 60 mg by mouth 2 (Two) Times a Day., Disp: , Rfl:   •  gabapentin (NEURONTIN) 300 MG capsule, 300 mg 3 (Three) Times a Day., Disp: , Rfl:   •  HYDROcodone-acetaminophen (NORCO)  MG per tablet, Take 1 tablet by mouth Every 6 (Six) Hours As Needed., Disp: , Rfl:   •  lamoTRIgine (LaMICtal) 200 MG tablet, Take 200 mg by mouth Daily., Disp: , Rfl:   •  metoprolol tartrate (LOPRESSOR) 25 MG tablet, TAKE 1/2 TABLET BY MOUTH TWICE A DAY, Disp: 30 tablet, Rfl: 7  •  montelukast (SINGULAIR) 10 MG tablet, Take 10 mg by mouth Every Night., Disp: , Rfl:   •  mupirocin (BACTROBAN) 2 % ointment, , Disp: , Rfl:   •  omeprazole (priLOSEC) 40 MG capsule, Take 40 mg by mouth 2 (Two) Times a Day., Disp: , Rfl:   •  triamterene-hydrochlorothiazide (DYAZIDE) 37.5-25 MG per capsule, TAKE ONE CAPSULE BY MOUTH EVERY MORNING, Disp: 30 capsule, Rfl: 3  •  nitroglycerin (NITROSTAT) 0.4 MG SL tablet, 1 under the tongue as needed for angina, may repeat q5mins for up three doses, Disp: 25 tablet, Rfl: 3   Assessment:        Patient Active Problem List   Diagnosis   • Essential hypertension   • Class 1 obesity due to excess calories without serious comorbidity with body mass index (BMI) of 30.0 to 30.9 in adult   • Mixed hyperlipidemia   • Abnormal cardiac function test   • Coronary artery disease involving native coronary artery of native heart without angina pectoris               Plan:            ICD-10-CM ICD-9-CM   1. Essential hypertension  I10 401.9   2. Mixed hyperlipidemia  E78.2 272.2   3. Coronary artery disease involving native coronary artery of native heart without angina pectoris  I25.10 414.01   4. Class 1 obesity due to excess calories without serious comorbidity with body mass index (BMI) of 30.0 to 30.9 in adult  E66.09 278.00    Z68.30 V85.30     1. Essential hypertension  Blood pressure under control    2. Mixed hyperlipidemia  Continue current treatment    3. Coronary artery disease involving native  coronary artery of native heart without angina pectoris  No angina pectoris    4. Class 1 obesity due to excess calories without serious comorbidity with body mass index (BMI) of 30.0 to 30.9 in adult  Counseling has been done       Still sob but no cp    3 months may need cath    COUNSELING:    Armando Dixon Jr.Counseling was given to patient for the following topics: diagnostic results, risk factor reductions, impressions, risks and benefits of treatment options and importance of treatment compliance .       SMOKING COUNSELING:    [unfilled]    Dictated using Dragon dictation

## 2022-09-16 RX ORDER — TRIAMTERENE AND HYDROCHLOROTHIAZIDE 37.5; 25 MG/1; MG/1
CAPSULE ORAL
Qty: 30 CAPSULE | Refills: 3 | Status: SHIPPED | OUTPATIENT
Start: 2022-09-16 | End: 2023-01-23

## 2022-10-11 NOTE — PROGRESS NOTES
Subjective:        Armando Dixon Jr. is a 67 y.o. male who here for follow up    No chief complaint on file.    6 months follow-up for hypertension    HPI  This is a 67-year-old male who is known to this provider.  He has a history of hypertension, hyperlipidemia, CAD, ENRRIQUE, and obesity.    For panel in 2020 revealed , HDL 59, LDL 45, and triglycerides 68.  ALT/AST within normal limits in 2020.  Previous treadmill stress test in June 2022 no ECG evidence of myocardial ischemia.  Findings was consistent with a normal ECG stress test.  Cardiac cath in 2020 with stent placement and successful angioplasty to mid LAD.  Echo in 2020 revealed EF 59.8% with LV diastolic function is normal    The following portions of the patient's history were reviewed and updated as appropriate: allergies, current medications, past family history, past medical history, past social history, past surgical history and problem list.    Past Medical History:   Diagnosis Date   • Allergic    • Anxiety    • Arthritis    • Asthma    • Chronic kidney disease     chronic stones  has had 4 eposodes,no renal failure   • Coronary artery disease    • Depression    • Hypertension    • ENRRIQUE on CPAP    • Osteoporosis    • Prostatitis    • Seasonal allergies          reports that he has never smoked. He has never used smokeless tobacco. He reports current alcohol use. He reports that he does not use drugs.     Family History   Problem Relation Age of Onset   • Hypertension Mother    • Arrhythmia Mother    • Hyperlipidemia Neg Hx    • Heart disease Neg Hx    • Heart failure Neg Hx    • Heart attack Neg Hx        ROS     Review of Systems  Constitutional: No wt loss, fever, fatigue  Gastrointestinal: No nausea, abdominal pain  Behavioral/Psych: No insomnia or anxiety  Cardiovascular: Denies shortness of breath and chest pain      Objective:           Vitals and nursing note reviewed.   Constitutional:       Appearance: Well-developed.   HENT:       Head: Normocephalic.      Right Ear: External ear normal.      Left Ear: External ear normal.   Neck:      Vascular: No JVD.   Pulmonary:      Effort: Pulmonary effort is normal. No respiratory distress.      Breath sounds: Normal breath sounds. No stridor. No rales.   Cardiovascular:      Normal rate. Regular rhythm.      No gallop.   Pulses:     Intact distal pulses.   Abdominal:      General: Bowel sounds are normal. There is no distension.      Palpations: Abdomen is soft.      Tenderness: There is no abdominal tenderness. There is no guarding.   Musculoskeletal: Normal range of motion.         General: No tenderness.      Cervical back: Normal range of motion. Skin:     General: Skin is warm.   Neurological:      Mental Status: Alert and oriented to person, place, and time.      Deep Tendon Reflexes: Reflexes are normal and symmetric.   Psychiatric:         Judgment: Judgment normal.         Procedures     2020    Interpretation Summary    • Calculated left ventricular EF = 59.8% Estimated left ventricular EF was in agreement with the calculated left ventricular EF.  • Left ventricular diastolic function was normal.  • There is no evidence of pericardial effusion. .        2020  HEMODYNAMIC / ANGIOGRAPHIC DATA:    1. Left ventricular end diastolic pressure was 10 mmHg.  2. Left ventriculography revealed an EF around 60%.    3. The left main is, normal left main.  4. The left anterior descending artery is , Left anterior descending long diffuse disease with a midportion consequence 90% stenosis reduced to 0% with 2.5/33 and 2.75/8 Xience stent,Ostial diagonal branch jailed and was dilated with a 2.5/12 trek balloon  5. The left circumflex is .Circumflex artery nondominant with OM ostial 60% stenosis  6. The right coronary artery is .Right coronary artery dominant posterolateral branch 50% stenosis  7. Successful angioplasty and stent to the mid 90% x 2 lesions and the diagonal branch ostial 80% reduced to 0%  with 2.5/33 and 2.75/8 Xience stent in LAD, diagonal branch was jailed but was dilated with 2.5/12 trek balloon     YASEMIN FLOW    PRE.... 3...       POST..... 3.     TYPE OF LESION........C.....     RECOMMENDATIONS:  Post-procedure care will focus on prevention of any ischemic events and congestive complications.  Aggressive risk factor modification will be carried out.     Importance of taking dual antiplatelets for one year  has been explained, risk of stent thrombosis leading to the acute MI, which carries high morbidity and mortality has been explained     Discontinuation or interruptions of these medications should be under the strict guidance of appropriate health professional         2022  Interpretation Summary       • The patient reported shortness of breath during the stress test.  • No ECG evidence of myocardial ischemia. Negative clinical evidence of myocardial ischemia. Findings consistent with a normal ECG stress test.     Asymptomatic for chest pain. ECG is negative for ischemia.   Ectopy: none at baseline and exercise, rare PVC couplet and rare PAC in recovery.   B/P response: Hypertensive, Baseline 128/80, Peak 190/80, Recovery: 200//78  Held  Metoprolol 24 hrs., following My chart instructions.   Exercise Tolerance: poor,Reached 19  Of  20 [place] Scale.  Crawley treadmill score+2.18   Estimates  5-year survival of 95  % . Using the Duke score there is a medium risk  of angiographic coronary disease        Supervised by:  Salma SORENSEN.         Current Outpatient Medications:   •  alfuzosin (UROXATRAL) 10 MG 24 hr tablet, Take 10 mg by mouth Daily., Disp: , Rfl:   •  aspirin 81 MG chewable tablet, Chew 1 tablet Daily., Disp: 30 tablet, Rfl: 11  •  atorvastatin (LIPITOR) 20 MG tablet, TAKE ONE TABLET BY MOUTH DAILY, Disp: 90 tablet, Rfl: 1  •  diclofenac (CATAFLAM) 50 MG tablet, , Disp: , Rfl:   •  DULoxetine (CYMBALTA) 60 MG capsule, Take 60 mg by mouth 2 (Two) Times a Day., Disp: , Rfl:   •   gabapentin (NEURONTIN) 300 MG capsule, 300 mg 3 (Three) Times a Day., Disp: , Rfl:   •  HYDROcodone-acetaminophen (NORCO)  MG per tablet, Take 1 tablet by mouth Every 6 (Six) Hours As Needed., Disp: , Rfl:   •  lamoTRIgine (LaMICtal) 200 MG tablet, Take 200 mg by mouth Daily., Disp: , Rfl:   •  metoprolol tartrate (LOPRESSOR) 25 MG tablet, TAKE 1/2 TABLET BY MOUTH TWICE A DAY, Disp: 90 tablet, Rfl: 1  •  montelukast (SINGULAIR) 10 MG tablet, Take 10 mg by mouth Every Night., Disp: , Rfl:   •  mupirocin (BACTROBAN) 2 % ointment, , Disp: , Rfl:   •  nitroglycerin (NITROSTAT) 0.4 MG SL tablet, 1 under the tongue as needed for angina, may repeat q5mins for up three doses, Disp: 25 tablet, Rfl: 3  •  omeprazole (priLOSEC) 40 MG capsule, Take 40 mg by mouth 2 (Two) Times a Day., Disp: , Rfl:   •  triamterene-hydrochlorothiazide (DYAZIDE) 37.5-25 MG per capsule, TAKE ONE CAPSULE BY MOUTH EVERY MORNING, Disp: 30 capsule, Rfl: 3     Assessment:        Patient Active Problem List   Diagnosis   • Essential hypertension   • Class 1 obesity due to excess calories without serious comorbidity with body mass index (BMI) of 30.0 to 30.9 in adult   • Mixed hyperlipidemia   • Abnormal cardiac function test   • Coronary artery disease involving native coronary artery of native heart without angina pectoris               Plan:   1.  Hypertension: Today in the office blood pressure is 127/70 and heart rate is 70.  Continue beta-blockade, and Dyazide.     Educated patient on exercising for at least 30 minutes a day for 2 to 3 days a week. Importance of controlling hypertension and blood pressure checkup on the regular basis has been explained. Hypertension as a silent killer has been discussed. Risk reduction of the weight and regular exercises to control the hypertension has been explained.    2.  Hyperlipidemia: Previous lipid panel as above.  CMP and lipid profile needed.  Continue statin.    Risk of the hyperlipidemia,  importance of the treatment has been explained. Pros and cons of the statins has been explained. Regular blood workup as well as side effects including the liver failure, myelopathy death has been explained.    3.  CAD with ischemic work-up as above.  No chest pain.  Continue current management.  No symptoms at this time. He declines a cath at this time. May need to consider in the future.       It was explained to the patient that stress testing carries 85% specificity/sensitivity, and does not rule out future cardiac event.        Risk reduction for the coronary artery disease, controlling the blood pressure, blood sugar management, cholesterol management, exercise, stress management, and proper compliance with medications and follow-up has been discussed    4.  Obesity: BMI 49.87. educated on diet and exercise.       No diagnosis found.    There are no diagnoses linked to this encounter.    COUNSELING: Done    Armando Dixon Jr.Counseling was given to patient for the following topics: diagnostic results, risk factor reductions, impressions, risks and benefits of treatment options and importance of treatment compliance .       SMOKING COUNSELING: Denies      He does not want to do a cardiac cath at this time. He states his shortness of breath has improved. He will follow up in 6 months, unless he needs to be seen sooner.    Sincerely,   EDU Maldonado  [facility]  10/12/22  10:57 EDT    EMR Dragon/Transcription disclaimer:   Much of this encounter note is an electronic transcription/translation of spoken language to printed text. The electronic translation of spoken language may permit erroneous, or at times, nonsensical words or phrases to be inadvertently transcribed; Although I have reviewed the note for such errors, some may still exist.

## 2022-10-12 ENCOUNTER — OFFICE VISIT (OUTPATIENT)
Dept: CARDIOLOGY | Facility: CLINIC | Age: 67
End: 2022-10-12

## 2022-10-12 VITALS
HEART RATE: 70 BPM | WEIGHT: 309 LBS | BODY MASS INDEX: 49.66 KG/M2 | HEIGHT: 66 IN | SYSTOLIC BLOOD PRESSURE: 127 MMHG | DIASTOLIC BLOOD PRESSURE: 70 MMHG

## 2022-10-12 DIAGNOSIS — E78.2 MIXED HYPERLIPIDEMIA: ICD-10-CM

## 2022-10-12 DIAGNOSIS — I10 PRIMARY HYPERTENSION: ICD-10-CM

## 2022-10-12 DIAGNOSIS — I25.10 CORONARY ARTERY DISEASE INVOLVING NATIVE CORONARY ARTERY OF NATIVE HEART WITHOUT ANGINA PECTORIS: ICD-10-CM

## 2022-10-12 DIAGNOSIS — E66.01 MORBID OBESITY WITH BMI OF 45.0-49.9, ADULT: Primary | ICD-10-CM

## 2022-10-12 PROCEDURE — 99214 OFFICE O/P EST MOD 30 MIN: CPT | Performed by: NURSE PRACTITIONER

## 2022-12-09 RX ORDER — ATORVASTATIN CALCIUM 20 MG/1
TABLET, FILM COATED ORAL
Qty: 90 TABLET | Refills: 1 | Status: SHIPPED | OUTPATIENT
Start: 2022-12-09

## 2023-01-23 RX ORDER — TRIAMTERENE AND HYDROCHLOROTHIAZIDE 37.5; 25 MG/1; MG/1
CAPSULE ORAL
Qty: 30 CAPSULE | Refills: 3 | Status: SHIPPED | OUTPATIENT
Start: 2023-01-23

## 2023-04-03 ENCOUNTER — HOSPITAL ENCOUNTER (EMERGENCY)
Facility: HOSPITAL | Age: 68
Discharge: HOME | End: 2023-04-03
Attending: EMERGENCY MEDICINE | Admitting: EMERGENCY MEDICINE
Payer: MEDICARE

## 2023-04-03 ENCOUNTER — APPOINTMENT (OUTPATIENT)
Dept: GENERAL RADIOLOGY | Facility: HOSPITAL | Age: 68
End: 2023-04-03
Payer: MEDICARE

## 2023-04-03 VITALS
RESPIRATION RATE: 16 BRPM | HEIGHT: 66 IN | BODY MASS INDEX: 49.82 KG/M2 | DIASTOLIC BLOOD PRESSURE: 74 MMHG | OXYGEN SATURATION: 96 % | HEART RATE: 71 BPM | TEMPERATURE: 98.8 F | WEIGHT: 310 LBS | SYSTOLIC BLOOD PRESSURE: 142 MMHG

## 2023-04-03 DIAGNOSIS — M62.830 SPASM OF THORACIC BACK MUSCLE: Primary | ICD-10-CM

## 2023-04-03 PROCEDURE — 25010000002 KETOROLAC TROMETHAMINE PER 15 MG: Performed by: NURSE PRACTITIONER

## 2023-04-03 PROCEDURE — 96372 THER/PROPH/DIAG INJ SC/IM: CPT

## 2023-04-03 PROCEDURE — 63710000001 PREDNISONE PER 1 MG: Performed by: NURSE PRACTITIONER

## 2023-04-03 PROCEDURE — 99283 EMERGENCY DEPT VISIT LOW MDM: CPT

## 2023-04-03 PROCEDURE — 72072 X-RAY EXAM THORAC SPINE 3VWS: CPT

## 2023-04-03 PROCEDURE — 73010 X-RAY EXAM OF SHOULDER BLADE: CPT

## 2023-04-03 RX ORDER — PREDNISONE 20 MG/1
60 TABLET ORAL ONCE
Status: COMPLETED | OUTPATIENT
Start: 2023-04-03 | End: 2023-04-03

## 2023-04-03 RX ORDER — CYCLOBENZAPRINE HCL 10 MG
10 TABLET ORAL 2 TIMES DAILY PRN
Qty: 20 TABLET | Refills: 0 | Status: SHIPPED | OUTPATIENT
Start: 2023-04-03

## 2023-04-03 RX ORDER — KETOROLAC TROMETHAMINE 30 MG/ML
30 INJECTION, SOLUTION INTRAMUSCULAR; INTRAVENOUS ONCE
Status: COMPLETED | OUTPATIENT
Start: 2023-04-03 | End: 2023-04-03

## 2023-04-03 RX ORDER — LIDOCAINE 50 MG/G
1 PATCH TOPICAL
Status: DISCONTINUED | OUTPATIENT
Start: 2023-04-03 | End: 2023-04-03 | Stop reason: HOSPADM

## 2023-04-03 RX ORDER — PREDNISONE 20 MG/1
40 TABLET ORAL DAILY
Qty: 10 TABLET | Refills: 0 | Status: SHIPPED | OUTPATIENT
Start: 2023-04-03 | End: 2023-04-08

## 2023-04-03 RX ADMIN — LIDOCAINE 1 PATCH: 700 PATCH TOPICAL at 13:06

## 2023-04-03 RX ADMIN — PREDNISONE 60 MG: 20 TABLET ORAL at 13:06

## 2023-04-03 RX ADMIN — KETOROLAC TROMETHAMINE 30 MG: 30 INJECTION, SOLUTION INTRAMUSCULAR at 13:06

## 2023-04-03 NOTE — ED PROVIDER NOTES
MD ATTESTATION NOTE    The JAIDEN and I have discussed this patient's history, physical exam, and treatment plan.    I provided a substantive portion of the care of this patient. I personally performed the physical exam, in its entirety. The attached note describes my personal findings.      Armando Dixon Jr. is a 67 y.o. male who presents to the ED c/o right shoulder pain that occurred after a fall Rony morning.  Pain is intermittent.  Worse with movement.  He denies hitting his head or having pain elsewhere.      On exam:  GENERAL: not distressed  HENT: nares patent  EYES: no scleral icterus  CV: regular rhythm, regular rate, 2+ right radial pulse  RESPIRATORY: normal effort  MUSCULOSKELETAL: no deformity, palpable muscle spasm medial to the right scapula.  Full range of motion to the right shoulder  NEURO: alert, moves all extremities, follows commands  SKIN: warm, dry    Labs  No results found for this or any previous visit (from the past 24 hour(s)).    Radiology  No Radiology Exams Resulted Within Past 24 Hours    Medications given in the ED:  Medications   ketorolac (TORADOL) injection 30 mg (has no administration in time range)   predniSONE (DELTASONE) tablet 60 mg (has no administration in time range)       Orders placed during this visit:  Orders Placed This Encounter   Procedures   • XR Spine Thoracic 3 View   • XR Scapula Right       Medical Decision Making:  ED Course as of 04/03/23 1526   Mon Apr 03, 2023   1331 MDM/dispo: I reviewed the thoracic spine and right scapula images in PACS.  My independent interpretation of the right scapula images are no acute displacement or fracture.  Patient's pain is slightly better after medications given here.  He does have hydrocodone written by his pain management provider.  We discussed plan for discharge to include changing and holding tizanidine and I will write him short course of Flexeril as well as steroids for a few days.  I reexamined his back and he does  not have any acute pain in the T7-8 area.  He has no fevers or chills and is able to ambulate at his baseline with a walking stick. [EW]      ED Course User Index  [EW] Guadalupe Chan APRN       X-ray of the right scapula independently interpreted by myself.  My findings: No dislocation or fracture.  Degenerative changes are noted.      PPE: Both the patient and I wore a surgical mask throughout the entire patient encounter.     Diagnosis  Final diagnoses:   Spasm of thoracic back muscle        Jake Moon II, MD  04/03/23 1527

## 2023-04-03 NOTE — ED PROVIDER NOTES
.  EMERGENCY DEPARTMENT ENCOUNTER    Room Number:  S01/01  Date seen:  4/3/2023  Time seen: 12:04 EDT  PCP: Bonifacio Dodd MD  Historian: Patient    HPI:  Chief complaint: Right-sided upper back pain  A complete HPI/ROS/PMH/PSH/SH/FH are unobtainable due to: Not applicable   context:Armando Dixon Jr. is a 67 y.o. male with past medical history significant for coronary artery disease, hypertension, arthritis and obesity who presents to the ED with c/o right-sided upper back pain near the scapula after a fall in his home early Sunday morning.  States it was mechanical fall when he got up to use the restroom and his knee gave out causing him to fall.  The pain is moderate to severe and worse with movement, better with rest.  He reports having tried his home dose of hydrocodone 10 and a muscle relaxer without much relief.  He denies any right shoulder pain and has no proximal humerus pain.  He denies any loss of sensation.      Social determinants of health which may impact assessment: n/a    Review of prior external notes (non-ED):n/a    Review of prior external test results outside of this encounter: n/a    ALLERGIES  Patient has no known allergies.    PAST MEDICAL HISTORY  Active Ambulatory Problems     Diagnosis Date Noted   • Essential hypertension 07/05/2018   • Class 1 obesity due to excess calories without serious comorbidity with body mass index (BMI) of 30.0 to 30.9 in adult 07/05/2018   • Mixed hyperlipidemia 12/24/2019   • Abnormal cardiac function test 02/24/2020   • Coronary artery disease involving native coronary artery of native heart without angina pectoris 03/10/2020     Resolved Ambulatory Problems     Diagnosis Date Noted   • No Resolved Ambulatory Problems     Past Medical History:   Diagnosis Date   • Allergic    • Anxiety    • Arthritis    • Asthma    • Chronic kidney disease    • Coronary artery disease    • Depression    • Hypertension    • ENRRIQUE on CPAP    • Osteoporosis    • Prostatitis     • Seasonal allergies        PAST SURGICAL HISTORY  Past Surgical History:   Procedure Laterality Date   • CARDIAC CATHETERIZATION     • CARDIAC CATHETERIZATION N/A 2/28/2020    Procedure: Left Heart Cath;  Surgeon: Minerva Adams MD;  Location:  [facility];  Service: Cardiology;  Laterality: N/A;   • CARDIAC CATHETERIZATION N/A 2/28/2020    Procedure: Coronary angiography;  Surgeon: Minerva Adams MD;  Location:  [facility];  Service: Cardiology;  Laterality: N/A;   • CARDIAC CATHETERIZATION N/A 2/28/2020    Procedure: Left ventriculography;  Surgeon: Minerva Adams MD;  Location:  [facility];  Service: Cardiology;  Laterality: N/A;   • CARDIAC CATHETERIZATION N/A 2/28/2020    Procedure: Percutaneous Coronary Intervention;  Surgeon: Minerva Adams MD;  Location:  [facility];  Service: Cardiology;  Laterality: N/A;   • CARDIAC CATHETERIZATION N/A 2/28/2020    Procedure: Stent FADY coronary;  Surgeon: Minerva Adams MD;  Location:  [facility];  Service: Cardiology;  Laterality: N/A;   • CARPAL TUNNEL RELEASE Bilateral    • CHOLECYSTECTOMY     • COLONOSCOPY  10/06/2014    [facility]   • ENDOSCOPY  08/29/2014    Severe esophagitis, gastritis, neg for H pylori, Walters's, and fungi   • GASTRIC BYPASS     • TOTAL HIP ARTHROPLASTY         FAMILY HISTORY  Family History   Problem Relation Age of Onset   • Hypertension Mother    • Arrhythmia Mother    • Hyperlipidemia Neg Hx    • Heart disease Neg Hx    • Heart failure Neg Hx    • Heart attack Neg Hx        SOCIAL HISTORY  Social History     Socioeconomic History   • Marital status:    Tobacco Use   • Smoking status: Never   • Smokeless tobacco: Never   Vaping Use   • Vaping Use: Never used   Substance and Sexual Activity   • Alcohol use: Yes     Comment: RARELY   • Drug use: No   • Sexual activity: Defer       REVIEW OF SYSTEMS  Review of  Systems    All systems reviewed and negative except for those discussed in HPI.     PHYSICAL EXAM    I have reviewed the triage vital signs and nursing notes.  ED Triage Vitals   Temp Heart Rate Resp BP SpO2   04/03/23 1146 04/03/23 1146 04/03/23 1146 04/03/23 1150 04/03/23 1146   98.8 °F (37.1 °C) 114 20 142/76 96 %      Temp src Heart Rate Source Patient Position BP Location FiO2 (%)   04/03/23 1146 04/03/23 1146 04/03/23 1150 04/03/23 1150 --   Tympanic Monitor Sitting Left arm      Physical Exam  Constitutional:           Comments: Area of pain.  There is NO thoracic or lumbar pain with palpation.  There is NO rib pain         GENERAL: not distressed  HENT: nares patent  EYES: no scleral icterus  NECK: no ROM limitations  CV: regular rhythm, regular rate, no murmur  RESPIRATORY: normal effort, CTAB  ABDOMEN: soft  : deferred  MUSCULOSKELETAL: I am able to fully range of motion the right upper extremity.  He can do a frontal and lateral raise.  There is no proximal humerus pain.  The radial pulse is 2+ on the right upper extremity.  NEURO: alert, moves all extremities, follows commands  SKIN: warm, dry    RADIOLOGY RESULTS  XR Spine Thoracic 3 View    Result Date: 4/3/2023  XR SPINE THORACIC 3 VW-  INDICATIONS: Pain  TECHNIQUE: 5 views of the thoracic spine  COMPARISON: None available  FINDINGS:  Loss of the disc space at T7/8 could be degenerative in nature, but correlate clinically to exclude any possibility of an infectious process/discitis-osteomyelitis, MRI correlation can be obtained as indicated. Multilevel endplate spurring and disc space narrowing are present. No acute fracture is identified. Alignment is in range of normal.        As described.  This report was finalized on 4/3/2023 1:20 PM by Dr. Jean Paul Skinner M.D.      XR Scapula Right    Result Date: 4/3/2023  XR SCAPULA RIGHT-  INDICATIONS: Pain  TECHNIQUE: 5 views of the right scapula  COMPARISON: None available  FINDINGS:  No acute  fracture, erosion, or dislocation is identified. Prominent degenerative changes are seen at the glenohumeral articulation. Mild hypertrophic degenerative change is noted at the acromioclavicular joint. Follow-up/further evaluation can be obtained as indications persist.       As described.  This report was finalized on 4/3/2023 1:18 PM by Dr. Jean Paul Skinner M.D.         Ordered the above noted radiological studies.  Independently interpreted by me .  My findings will be discussed in the medical decision section below.     PROGRESS, DATA ANALYSIS, CONSULTS AND MEDICAL DECISION MAKING    Please note that this section constitutes my independent interpretation of clinical data including lab results, radiology, EKG's.  This constitutes my independent professional opinion regarding differential diagnosis and management of this patient.  It may include any factors such as history from outside sources, review of external records, social determinants of health, management of medications, response to those treatments, and discussions with other providers.      ED Course as of 04/03/23 1401   Mon Apr 03, 2023   1331 MDM/dispo: I reviewed the thoracic spine and right scapula images in PACS.  My independent interpretation of the right scapula images are no acute displacement or fracture.  Patient's pain is slightly better after medications given here.  He does have hydrocodone written by his pain management provider.  We discussed plan for discharge to include changing and holding tizanidine and I will write him short course of Flexeril as well as steroids for a few days.  I reexamined his back and he does not have any acute pain in the T7-8 area.  He has no fevers or chills and is able to ambulate at his baseline with a walking stick. [EW]      ED Course User Index  [EW] Guadalupe Chan APRN       Orders placed during this visit:  Orders Placed This Encounter   Procedures   • XR Spine Thoracic 3 View   • XR Scapula Right               MDM DDX: muscle spasm, thoracic strain, scapula injury.  I do not suspect any right shoulder fracture/dislocation or  Proximal humerus fracture.  SEE ED course for remainder of MDM     Reviewed pt's history and workup with Dr. Moon.  After a bedside evaluation, Dr. Moon agrees with the plan of care.    The patient's history, physical exam, and lab findings were discussed with the physician, who also performed a face to face history and physical exam.  I discussed all results and noted any abnormalities with patient.  Discussed absoute need to recheck abnormalities with their family physician.  I answered any of the patient's questions.  Discussed plan for discharge, as there is no emergent indication for admission.  Pt is agreeable and understands need for follow up and repeat testing.  Pt is aware that discharge does not mean that nothing is wrong but it indicates no emergency is present and they must continue care with their family physician.  Pt is discharged with instructions to follow up with primary care doctor to have their blood pressure rechecked.         DIAGNOSIS  Final diagnoses:   Spasm of thoracic back muscle       FOLLOW-UP  Bonifacio Dodd MD  [address] [address]  [phone]    Schedule an appointment as soon as possible for a visit in 2 days  As needed, If symptoms worsen        Latest Documented Vital Signs:  As of 14:01 EDT  BP- 142/74 HR- 71 Temp- 98.8 °F (37.1 °C) (Tympanic) O2 sat- 96%    Appropriate PPE utilized throughout this patient encounter to include mask and eye protection, per current protocol. Hand hygiene was performed before donning PPE and after removal when leaving the room.    Please note that portions of this were completed with a voice recognition program.     Note Disclaimer: At [facility], we believe that sharing information builds trust and better relationships. You are receiving this note because you are receiving care at  [facility] or recently visited. It is possible you will see health information before a provider has talked with you about it. This kind of information can be easy to misunderstand. To help you fully understand what it means for your health, we urge you to discuss this note with your provider.                 Guadalupe Chan, APRN  04/03/23 1405

## 2023-04-03 NOTE — ED NOTES
Pt arrives ambulatory to triage for right shoulder pain. Pt fell on Saturday night when his knees gave out on him. Pt denies hitting his head or LOC.

## 2023-04-03 NOTE — DISCHARGE INSTRUCTIONS
Ice on and off    Can also try over the counter muscle rub    Hold Tizanadine for now; take Flexeril (Cyclobenzaprine) instead    Take Prednisone    Return Precautions    Although you are being discharged from the ED today, I encourage you to return for worsening symptoms.  Things can, and do, change such that treatment at home with medication may not be adequate.      Specifically, return for any of the following:    Chest pain, shortness of breath, pain or nausea and vomiting not controlled by medications provided.    Please make a follow up with your Primary Care Provider for a blood pressure recheck.

## 2023-05-11 ENCOUNTER — OFFICE VISIT (OUTPATIENT)
Dept: CARDIOLOGY | Facility: CLINIC | Age: 68
End: 2023-05-11
Payer: MEDICARE

## 2023-05-11 VITALS
WEIGHT: 295 LBS | HEART RATE: 68 BPM | SYSTOLIC BLOOD PRESSURE: 125 MMHG | BODY MASS INDEX: 47.41 KG/M2 | DIASTOLIC BLOOD PRESSURE: 60 MMHG | HEIGHT: 66 IN

## 2023-05-11 DIAGNOSIS — I10 ESSENTIAL HYPERTENSION: ICD-10-CM

## 2023-05-11 DIAGNOSIS — R94.31 ABNORMAL ELECTROCARDIOGRAM: ICD-10-CM

## 2023-05-11 DIAGNOSIS — I25.10 CORONARY ARTERY DISEASE INVOLVING NATIVE CORONARY ARTERY OF NATIVE HEART WITHOUT ANGINA PECTORIS: Primary | ICD-10-CM

## 2023-05-11 DIAGNOSIS — Z01.818 PREOPERATIVE EXAMINATION, UNSPECIFIED: ICD-10-CM

## 2023-05-11 RX ORDER — DOXEPIN HYDROCHLORIDE 25 MG/1
50 CAPSULE ORAL NIGHTLY PRN
COMMUNITY
Start: 2023-03-11

## 2023-05-11 NOTE — PROGRESS NOTES
6MO SURGERY CLR RT KNEE    Subjective:        Armando Dixon Jr. is a 67 y.o. male who here for follow up    CC  KNEE SURG CLEARANCE  HPI  67 years old male with coronary artery disease hypertension needs knee surgery clearance denies any chest pains or tightness in the chest     Problems Addressed this Visit        Cardiac and Vasculature    Essential hypertension    Relevant Orders    Stress Test With Myocardial Perfusion One Day    Adult Transthoracic Echo Complete W/ Cont if Necessary Per Protocol    Coronary artery disease involving native coronary artery of native heart without angina pectoris - Primary    Relevant Orders    Stress Test With Myocardial Perfusion One Day    Adult Transthoracic Echo Complete W/ Cont if Necessary Per Protocol    Abnormal electrocardiogram    Relevant Orders    Stress Test With Myocardial Perfusion One Day    Adult Transthoracic Echo Complete W/ Cont if Necessary Per Protocol       Health Encounters    Preoperative examination, unspecified    Relevant Orders    Stress Test With Myocardial Perfusion One Day    Adult Transthoracic Echo Complete W/ Cont if Necessary Per Protocol   Diagnoses       Codes Comments    Coronary artery disease involving native coronary artery of native heart without angina pectoris    -  Primary ICD-10-CM: I25.10  ICD-9-CM: 414.01     Essential hypertension     ICD-10-CM: I10  ICD-9-CM: 401.9     Abnormal electrocardiogram     ICD-10-CM: R94.31  ICD-9-CM: 794.31     Preoperative examination, unspecified     ICD-10-CM: Z01.818  ICD-9-CM: V72.84         .    The following portions of the patient's history were reviewed and updated as appropriate: allergies, current medications, past family history, past medical history, past social history, past surgical history and problem list.    Past Medical History:   Diagnosis Date   • Allergic    • Anxiety    • Arthritis    • Asthma    • Chronic kidney disease     chronic stones  has had 4 eposodes,no renal failure  "  • Coronary artery disease    • Depression    • Hypertension    • ENRRIQUE on CPAP    • Osteoporosis    • Prostatitis    • Seasonal allergies      reports that he has never smoked. He has never used smokeless tobacco. He reports current alcohol use. He reports that he does not use drugs.   Family History   Problem Relation Age of Onset   • Hypertension Mother    • Arrhythmia Mother    • Hyperlipidemia Neg Hx    • Heart disease Neg Hx    • Heart failure Neg Hx    • Heart attack Neg Hx        Review of Systems  Constitutional: No wt loss, fever, fatigue  Gastrointestinal: No nausea, abdominal pain  Behavioral/Psych: No insomnia or anxiety   Cardiovascular no chest pains or tightness in the chest  Objective:       Physical Exam           Physical Exam  /60   Pulse 68   Ht 167.6 cm (66\")   Wt 134 kg (295 lb)   BMI 47.61 kg/m²     General appearance: No acute changes   Eyes: Sclerae conjunctivae normal, pupils reactive   HENT: Atraumatic; oropharynx clear with moist mucous membranes and no mucosal ulcerations;  Neck: Trachea midline; NECK, supple, no thyromegaly or lymphadenopathy   Lungs: Normal size and shape, normal breath sounds, equal distribution of air, no rales and rhonchi   CV: S1-S2 regular, no murmurs, no rub, no gallop   Abdomen: Soft, nontender; no masses , no abnormal abdominal sounds   Extremities: No deformity , normal color , no peripheral edema   Skin: Normal temperature, turgor and texture; no rash, ulcers  Psych: Appropriate affect, alert and oriented to person, place and time             ECG 12 Lead    Date/Time: 5/11/2023 12:11 PM  Performed by: Minerva Adams MD  Authorized by: Minerva Adams MD   Comparison: compared with previous ECG   Similar to previous ECG  Rhythm: sinus rhythm  ST Flattening: all    Clinical impression: non-specific ECG              Echocardiogram:        Current Outpatient Medications:   •  alfuzosin (UROXATRAL) 10 MG 24 hr tablet, Take 1 tablet by " mouth Daily., Disp: , Rfl:   •  aspirin 81 MG chewable tablet, Chew 1 tablet Daily., Disp: 30 tablet, Rfl: 11  •  atorvastatin (LIPITOR) 20 MG tablet, TAKE ONE TABLET BY MOUTH DAILY, Disp: 90 tablet, Rfl: 1  •  cyclobenzaprine (FLEXERIL) 10 MG tablet, Take 1 tablet by mouth 2 (Two) Times a Day As Needed for Muscle Spasms., Disp: 20 tablet, Rfl: 0  •  diclofenac (CATAFLAM) 50 MG tablet, , Disp: , Rfl:   •  doxepin (SINEquan) 25 MG capsule, , Disp: , Rfl:   •  DULoxetine (CYMBALTA) 60 MG capsule, Take 1 capsule by mouth 2 (Two) Times a Day., Disp: , Rfl:   •  gabapentin (NEURONTIN) 300 MG capsule, 1 capsule 3 (Three) Times a Day., Disp: , Rfl:   •  HYDROcodone-acetaminophen (NORCO)  MG per tablet, Take 1 tablet by mouth Every 6 (Six) Hours As Needed., Disp: , Rfl:   •  lamoTRIgine (LaMICtal) 200 MG tablet, Take 1 tablet by mouth Daily., Disp: , Rfl:   •  metoprolol tartrate (LOPRESSOR) 25 MG tablet, TAKE 1/2 TABLET BY MOUTH TWICE A DAY, Disp: 90 tablet, Rfl: 1  •  montelukast (SINGULAIR) 10 MG tablet, Take 1 tablet by mouth Every Night., Disp: , Rfl:   •  mupirocin (BACTROBAN) 2 % ointment, , Disp: , Rfl:   •  nitroglycerin (NITROSTAT) 0.4 MG SL tablet, 1 under the tongue as needed for angina, may repeat q5mins for up three doses, Disp: 25 tablet, Rfl: 3  •  omeprazole (priLOSEC) 40 MG capsule, Take 1 capsule by mouth 2 (Two) Times a Day., Disp: , Rfl:   •  triamterene-hydrochlorothiazide (DYAZIDE) 37.5-25 MG per capsule, TAKE ONE CAPSULE BY MOUTH EVERY MORNING, Disp: 30 capsule, Rfl: 3  •  Vyvanse 20 MG capsule, , Disp: , Rfl:    Assessment:        Patient Active Problem List   Diagnosis   • Essential hypertension   • Class 1 obesity due to excess calories without serious comorbidity with body mass index (BMI) of 30.0 to 30.9 in adult   • Mixed hyperlipidemia   • Abnormal cardiac function test   • Coronary artery disease involving native coronary artery of native heart without angina pectoris   • Abnormal  electrocardiogram   • Preoperative examination, unspecified               Plan:            ICD-10-CM ICD-9-CM   1. Coronary artery disease involving native coronary artery of native heart without angina pectoris  I25.10 414.01   2. Essential hypertension  I10 401.9   3. Abnormal electrocardiogram  R94.31 794.31   4. Preoperative examination, unspecified  Z01.818 V72.84     1. Coronary artery disease involving native coronary artery of native heart without angina pectoris  Considering the patient's symptoms as well as clinical situation and  EKG findings, along with cardiac risk factors, ischemic workup is necessary to rule out ischemic cardiomyopathy, stress induced arrhythmias, and functional capacity for diagnosis as well as prognostic consideration    - Stress Test With Myocardial Perfusion One Day  - Adult Transthoracic Echo Complete W/ Cont if Necessary Per Protocol    2. Essential hypertension  Continue current treatment  - Stress Test With Myocardial Perfusion One Day  - Adult Transthoracic Echo Complete W/ Cont if Necessary Per Protocol    3. Abnormal electrocardiogram  Considering the patient's symptoms as well as clinical situation and  EKG findings, along with cardiac risk factors, ischemic workup is necessary to rule out ischemic cardiomyopathy, stress induced arrhythmias, and functional capacity for diagnosis as well as prognostic consideration  *  - Stress Test With Myocardial Perfusion One Day  - Adult Transthoracic Echo Complete W/ Cont if Necessary Per Protocol    4. Preoperative examination, unspecified  Considering the patient's symptoms as well as clinical situation and  EKG findings, along with cardiac risk factors, ischemic workup is necessary to rule out ischemic cardiomyopathy, stress induced arrhythmias, and functional capacity for diagnosis as well as prognostic consideration    - Stress Test With Myocardial Perfusion One Day  - Adult Transthoracic Echo Complete W/ Cont if Necessary Per  Protocol       BELLE, ECHO    FOLLOW UP  COUNSELING:    Armando Dixon Jr.Counseling was given to patient for the following topics: diagnostic results, risk factor reductions, impressions, risks and benefits of treatment options and importance of treatment compliance .       SMOKING COUNSELING:        Dictated using Dragon dictation

## 2023-05-18 ENCOUNTER — TELEPHONE (OUTPATIENT)
Dept: CARDIOLOGY | Facility: CLINIC | Age: 68
End: 2023-05-18

## 2023-05-18 NOTE — TELEPHONE ENCOUNTER
"  Caller: Armando Dixon Jr. \"Nasim\"    Relationship: Self    Best call back number: [phone]    Who are you requesting to speak with (clinical staff, provider,  specific staff member): CLINICAL    What was the call regarding: PT STATES THAT HE HAS UPCOMING TESTS COMING UP AND HE WANTS TO CLARIFY A MEDICATION AND WHETHER OR NOT TO TAKE IT AND PT ALSO WANTING TO CLARIFY HOW MANY DAYS TO HOLD MEDICATION FOR TESTS, ONE IS A STIMUULANT     Do you require a callback: YES PLEASE  "

## 2023-05-22 NOTE — TELEPHONE ENCOUNTER
Rx Refill Note  Requested Prescriptions     Pending Prescriptions Disp Refills   • triamterene-hydrochlorothiazide (DYAZIDE) 37.5-25 MG per capsule [Pharmacy Med Name: TRIAMTERENE-HCTZ 37.5-25 MG CP] 30 capsule 3     Sig: TAKE ONE CAPSULE BY MOUTH EVERY MORNING      Last office visit with prescribing clinician: 5/11/2023   Last telemedicine visit with prescribing clinician: 5/18/2023   Next office visit with prescribing clinician: 5/25/2023                         Would you like a call back once the refill request has been completed: [] Yes [] No    If the office needs to give you a call back, can they leave a voicemail: [] Yes [] No    Ailyn Prado MA  05/22/23, 07:14 EDT

## 2023-05-23 PROBLEM — Z01.818 PREOPERATIVE EXAMINATION, UNSPECIFIED: Status: ACTIVE | Noted: 2023-05-23

## 2023-05-23 PROBLEM — R94.31 ABNORMAL ELECTROCARDIOGRAM: Status: ACTIVE | Noted: 2023-05-23

## 2023-05-25 ENCOUNTER — HOSPITAL ENCOUNTER (OUTPATIENT)
Dept: CARDIOLOGY | Facility: HOSPITAL | Age: 68
Discharge: HOME | End: 2023-05-25
Payer: MEDICARE

## 2023-05-25 ENCOUNTER — OFFICE VISIT (OUTPATIENT)
Dept: CARDIOLOGY | Facility: CLINIC | Age: 68
End: 2023-05-25
Payer: MEDICARE

## 2023-05-25 ENCOUNTER — HOSPITAL ENCOUNTER (OUTPATIENT)
Dept: CARDIOLOGY | Facility: HOSPITAL | Age: 68
Discharge: HOME | End: 2023-05-25
Admitting: INTERNAL MEDICINE
Payer: MEDICARE

## 2023-05-25 VITALS
HEIGHT: 66 IN | SYSTOLIC BLOOD PRESSURE: 134 MMHG | WEIGHT: 295 LBS | HEART RATE: 61 BPM | DIASTOLIC BLOOD PRESSURE: 83 MMHG | BODY MASS INDEX: 47.41 KG/M2

## 2023-05-25 VITALS
HEART RATE: 65 BPM | HEIGHT: 66 IN | BODY MASS INDEX: 45.96 KG/M2 | SYSTOLIC BLOOD PRESSURE: 112 MMHG | WEIGHT: 286 LBS | DIASTOLIC BLOOD PRESSURE: 70 MMHG

## 2023-05-25 VITALS
OXYGEN SATURATION: 97 % | BODY MASS INDEX: 47.41 KG/M2 | HEIGHT: 66 IN | DIASTOLIC BLOOD PRESSURE: 53 MMHG | WEIGHT: 295 LBS | HEART RATE: 61 BPM | SYSTOLIC BLOOD PRESSURE: 117 MMHG

## 2023-05-25 DIAGNOSIS — E78.2 MIXED HYPERLIPIDEMIA: ICD-10-CM

## 2023-05-25 DIAGNOSIS — I10 ESSENTIAL HYPERTENSION: ICD-10-CM

## 2023-05-25 DIAGNOSIS — Z01.818 PREOPERATIVE EXAMINATION, UNSPECIFIED: ICD-10-CM

## 2023-05-25 DIAGNOSIS — E66.01 MORBID OBESITY WITH BMI OF 45.0-49.9, ADULT: ICD-10-CM

## 2023-05-25 DIAGNOSIS — I25.10 CORONARY ARTERY DISEASE INVOLVING NATIVE CORONARY ARTERY OF NATIVE HEART WITHOUT ANGINA PECTORIS: Primary | ICD-10-CM

## 2023-05-25 LAB
AORTIC DIMENSIONLESS INDEX: 0.6 (DI)
BH CV ECHO MEAS - ACS: 1.75 CM
BH CV ECHO MEAS - AO MAX PG: 9.6 MMHG
BH CV ECHO MEAS - AO MEAN PG: 5.8 MMHG
BH CV ECHO MEAS - AO ROOT DIAM: 2.09 CM
BH CV ECHO MEAS - AO V2 MAX: 154.7 CM/SEC
BH CV ECHO MEAS - AO V2 VTI: 33.7 CM
BH CV ECHO MEAS - AVA(I,D): 1.68 CM2
BH CV ECHO MEAS - EDV(CUBED): 87.4 ML
BH CV ECHO MEAS - EDV(MOD-SP2): 76 ML
BH CV ECHO MEAS - EDV(MOD-SP4): 127 ML
BH CV ECHO MEAS - EF(MOD-BP): 65.6 %
BH CV ECHO MEAS - EF(MOD-SP2): 63.2 %
BH CV ECHO MEAS - EF(MOD-SP4): 64.6 %
BH CV ECHO MEAS - ESV(CUBED): 26.4 ML
BH CV ECHO MEAS - ESV(MOD-SP2): 28 ML
BH CV ECHO MEAS - ESV(MOD-SP4): 45 ML
BH CV ECHO MEAS - FS: 32.9 %
BH CV ECHO MEAS - IVS/LVPW: 1.1 CM
BH CV ECHO MEAS - IVSD: 1.5 CM
BH CV ECHO MEAS - LA DIMENSION: 3.1 CM
BH CV ECHO MEAS - LAT PEAK E' VEL: 9 CM/SEC
BH CV ECHO MEAS - LV DIASTOLIC VOL/BSA (35-75): 53.8 CM2
BH CV ECHO MEAS - LV MASS(C)D: 251.5 GRAMS
BH CV ECHO MEAS - LV MAX PG: 3.2 MMHG
BH CV ECHO MEAS - LV MEAN PG: 1.77 MMHG
BH CV ECHO MEAS - LV SYSTOLIC VOL/BSA (12-30): 19.1 CM2
BH CV ECHO MEAS - LV V1 MAX: 89.7 CM/SEC
BH CV ECHO MEAS - LV V1 VTI: 19.4 CM
BH CV ECHO MEAS - LVIDD: 4.4 CM
BH CV ECHO MEAS - LVIDS: 3 CM
BH CV ECHO MEAS - LVOT AREA: 2.9 CM2
BH CV ECHO MEAS - LVOT DIAM: 1.93 CM
BH CV ECHO MEAS - LVPWD: 1.36 CM
BH CV ECHO MEAS - MED PEAK E' VEL: 6.2 CM/SEC
BH CV ECHO MEAS - MV A DUR: 0.17 SEC
BH CV ECHO MEAS - MV A MAX VEL: 88.8 CM/SEC
BH CV ECHO MEAS - MV DEC SLOPE: 275.9 CM/SEC2
BH CV ECHO MEAS - MV DEC TIME: 208 MSEC
BH CV ECHO MEAS - MV E MAX VEL: 86.8 CM/SEC
BH CV ECHO MEAS - MV E/A: 0.98
BH CV ECHO MEAS - MV MAX PG: 3 MMHG
BH CV ECHO MEAS - MV MEAN PG: 1.49 MMHG
BH CV ECHO MEAS - MV P1/2T: 102.3 MSEC
BH CV ECHO MEAS - MV V2 VTI: 34.3 CM
BH CV ECHO MEAS - MVA(P1/2T): 2.15 CM2
BH CV ECHO MEAS - MVA(VTI): 1.65 CM2
BH CV ECHO MEAS - PA ACC TIME: 0.16 SEC
BH CV ECHO MEAS - PA PR(ACCEL): 7.7 MMHG
BH CV ECHO MEAS - PA V2 MAX: 100.4 CM/SEC
BH CV ECHO MEAS - PULM A REVS DUR: 0.15 SEC
BH CV ECHO MEAS - PULM A REVS VEL: 25.3 CM/SEC
BH CV ECHO MEAS - PULM DIAS VEL: 38.8 CM/SEC
BH CV ECHO MEAS - PULM S/D: 1.17
BH CV ECHO MEAS - PULM SYS VEL: 45.3 CM/SEC
BH CV ECHO MEAS - QP/QS: 1.77
BH CV ECHO MEAS - RV MAX PG: 1.87 MMHG
BH CV ECHO MEAS - RV V1 MAX: 68.4 CM/SEC
BH CV ECHO MEAS - RV V1 VTI: 15.6 CM
BH CV ECHO MEAS - RVDD: 2.7 CM
BH CV ECHO MEAS - RVOT DIAM: 2.9 CM
BH CV ECHO MEAS - SI(MOD-SP2): 20.3 ML/M2
BH CV ECHO MEAS - SI(MOD-SP4): 34.8 ML/M2
BH CV ECHO MEAS - SV(LVOT): 56.6 ML
BH CV ECHO MEAS - SV(MOD-SP2): 48 ML
BH CV ECHO MEAS - SV(MOD-SP4): 82 ML
BH CV ECHO MEAS - SV(RVOT): 100.1 ML
BH CV ECHO MEAS - TAPSE (>1.6): 3 CM
BH CV ECHO MEASUREMENTS AVERAGE E/E' RATIO: 11.42
BH CV REST NUCLEAR ISOTOPE DOSE: 10.9 MCI
BH CV STRESS BP STAGE 1: NORMAL
BH CV STRESS COMMENTS STAGE 1: NORMAL
BH CV STRESS DOSE REGADENOSON STAGE 1: 0.4
BH CV STRESS DURATION MIN STAGE 1: 1
BH CV STRESS DURATION SEC STAGE 1: 0
BH CV STRESS HR STAGE 1: 70
BH CV STRESS NUCLEAR ISOTOPE DOSE: 32.1 MCI
BH CV STRESS O2 STAGE 1: 98
BH CV STRESS PROTOCOL 1: NORMAL
BH CV STRESS RECOVERY BP: NORMAL MMHG
BH CV STRESS RECOVERY HR: 68 BPM
BH CV STRESS RECOVERY O2: 97 %
BH CV STRESS STAGE 1: 1
BH CV XLRA - RV BASE: 3.3 CM
BH CV XLRA - RV LENGTH: 7.9 CM
BH CV XLRA - RV MID: 2.2 CM
BH CV XLRA - TDI S': 12.6 CM/SEC
LEFT ATRIUM VOLUME INDEX: 22.4 ML/M2
LV EF NUC BP: 60 %
MAXIMAL PREDICTED HEART RATE: 154 BPM
MAXIMAL PREDICTED HEART RATE: 154 BPM
PERCENT MAX PREDICTED HR: 45.45 %
SINUS: 2.6 CM
STJ: 3.1 CM
STRESS BASELINE BP: NORMAL MMHG
STRESS BASELINE HR: 63 BPM
STRESS O2 SAT REST: 97 %
STRESS PERCENT HR: 53 %
STRESS POST ESTIMATED WORKLOAD: 1 METS
STRESS POST EXERCISE DUR MIN: 1 MIN
STRESS POST EXERCISE DUR SEC: 0 SEC
STRESS POST O2 SAT PEAK: 98 %
STRESS POST PEAK BP: NORMAL MMHG
STRESS POST PEAK HR: 70 BPM
STRESS TARGET HR: 131 BPM
STRESS TARGET HR: 131 BPM

## 2023-05-25 PROCEDURE — 25510000001 PERFLUTREN (DEFINITY) 8.476 MG IN SODIUM CHLORIDE (PF) 0.9 % 10 ML INJECTION: Performed by: INTERNAL MEDICINE

## 2023-05-25 PROCEDURE — 78452 HT MUSCLE IMAGE SPECT MULT: CPT

## 2023-05-25 PROCEDURE — 0 TECHNETIUM SESTAMIBI: Performed by: INTERNAL MEDICINE

## 2023-05-25 PROCEDURE — 93017 CV STRESS TEST TRACING ONLY: CPT

## 2023-05-25 PROCEDURE — 25010000002 REGADENOSON 0.4 MG/5ML SOLUTION: Performed by: INTERNAL MEDICINE

## 2023-05-25 PROCEDURE — 93306 TTE W/DOPPLER COMPLETE: CPT

## 2023-05-25 PROCEDURE — A9500 TC99M SESTAMIBI: HCPCS | Performed by: INTERNAL MEDICINE

## 2023-05-25 RX ORDER — REGADENOSON 0.08 MG/ML
0.4 INJECTION, SOLUTION INTRAVENOUS
Status: COMPLETED | OUTPATIENT
Start: 2023-05-25 | End: 2023-05-25

## 2023-05-25 RX ADMIN — TECHNETIUM TC 99M SESTAMIBI 1 DOSE: 1 INJECTION INTRAVENOUS at 09:08

## 2023-05-25 RX ADMIN — REGADENOSON 0.4 MG: 0.08 INJECTION, SOLUTION INTRAVENOUS at 09:08

## 2023-05-25 RX ADMIN — TECHNETIUM TC 99M SESTAMIBI 1 DOSE: 1 INJECTION INTRAVENOUS at 07:00

## 2023-05-25 RX ADMIN — SODIUM CHLORIDE 3 ML: 9 INJECTION INTRAMUSCULAR; INTRAVENOUS; SUBCUTANEOUS at 07:44

## 2023-05-25 NOTE — PROGRESS NOTES
TEST RESULTS - CLEARANCE FOR KNEE SURGERY DR. RAJPUT   Subjective:        Armando Dixon Jr. is a 68 y.o. male who here for follow up    CC  Preop clearance  HPI  68-year-old male with coronary artery disease hypertension hyperlipidemia here for the follow-up no complaints of chest pains tightness heaviness or the pressure sensation     Problems Addressed this Visit          Cardiac and Vasculature    Essential hypertension    Mixed hyperlipidemia    Coronary artery disease involving native coronary artery of native heart without angina pectoris - Primary       Health Encounters    Preoperative examination, unspecified     Other Visit Diagnoses       Morbid obesity with BMI of 45.0-49.9, adult              Diagnoses         Codes Comments    Coronary artery disease involving native coronary artery of native heart without angina pectoris    -  Primary ICD-10-CM: I25.10  ICD-9-CM: 414.01     Essential hypertension     ICD-10-CM: I10  ICD-9-CM: 401.9     Morbid obesity with BMI of 45.0-49.9, adult     ICD-10-CM: E66.01, Z68.42  ICD-9-CM: 278.01, V85.42     Mixed hyperlipidemia     ICD-10-CM: E78.2  ICD-9-CM: 272.2     Preoperative examination, unspecified     ICD-10-CM: Z01.818  ICD-9-CM: V72.84           .    The following portions of the patient's history were reviewed and updated as appropriate: allergies, current medications, past family history, past medical history, past social history, past surgical history and problem list.    Past Medical History:   Diagnosis Date    Allergic     Anxiety     Arthritis     Asthma     CHILDOOD    Coronary artery disease     STENT X 2    Cut of lower leg 05/26/2023    MULTIPLE-RIGHT-TO NOTIFY DR. RAJPUT    DDD (degenerative disc disease), cervical     LUMBAR, THORACIC, SACRAL    Deep vein thrombosis     Depression     Esophagitis with gastritis     GERD (gastroesophageal reflux disease)     History of kidney stones     Hypertension     Incisional hernia     Numbness     LEFT  "LATERAL THIGH S/P HIP ARTHROPLASTY/MEDIAL PAIN    ENRRIQUE on CPAP     Osteoarthritis of right knee     Osteoporosis     Prediabetes     Prostatitis     Pulmonary embolism     Under care of pain management specialist     Urgency incontinence      reports that he has never smoked. He has never used smokeless tobacco. He reports current alcohol use. He reports that he does not use drugs.   Family History   Problem Relation Age of Onset    Hypertension Mother     Arrhythmia Mother     Hyperlipidemia Neg Hx     Heart disease Neg Hx     Heart failure Neg Hx     Heart attack Neg Hx     Malig Hyperthermia Neg Hx        Review of Systems  Constitutional: No wt loss, fever, fatigue  Gastrointestinal: No nausea, abdominal pain  Behavioral/Psych: No insomnia or anxiety   Cardiovascular no chest pains or tightness in the chest  Objective:       Physical Exam           Physical Exam  /70   Pulse 65   Ht 167.6 cm (66\")   Wt 130 kg (286 lb)   BMI 46.16 kg/m²     General appearance: No acute changes   Eyes: Sclerae conjunctivae normal, pupils reactive   HENT: Atraumatic; oropharynx clear with moist mucous membranes and no mucosal ulcerations;  Neck: Trachea midline; NECK, supple, no thyromegaly or lymphadenopathy   Lungs: Normal size and shape, normal breath sounds, equal distribution of air, no rales and rhonchi   CV: S1-S2 regular, no murmurs, no rub, no gallop   Abdomen: Soft, nontender; no masses , no abnormal abdominal sounds   Extremities: No deformity , normal color , no peripheral edema   Skin: Normal temperature, turgor and texture; no rash, ulcers  Psych: Appropriate affect, alert and oriented to person, place and time           Procedures      Echocardiogram:        Current Outpatient Medications:     alfuzosin (UROXATRAL) 10 MG 24 hr tablet, Take 1 tablet by mouth Daily., Disp: , Rfl:     atorvastatin (LIPITOR) 20 MG tablet, TAKE ONE TABLET BY MOUTH DAILY, Disp: 90 tablet, Rfl: 1    cyclobenzaprine (FLEXERIL) 10 MG " tablet, Take 1 tablet by mouth 2 (Two) Times a Day As Needed for Muscle Spasms., Disp: 20 tablet, Rfl: 0    doxepin (SINEquan) 25 MG capsule, 2 capsules At Night As Needed for Sleep., Disp: , Rfl:     DULoxetine HCl (DRIZALMA) 30 MG capsule, Take  by mouth Daily., Disp: , Rfl:     gabapentin (NEURONTIN) 300 MG capsule, 3 capsules Every Morning.  CAPSULES AT BEDTIME, Disp: , Rfl:     lamoTRIgine (LaMICtal) 150 MG tablet, Take 200 mg by mouth Every Night., Disp: , Rfl:     lisdexamfetamine (VYVANSE) 40 MG capsule, Take 1 capsule by mouth Every Morning HOLD 48 HOURS PRIOR TO SURGERY, Disp: , Rfl:     metoprolol tartrate (LOPRESSOR) 25 MG tablet, TAKE 1/2 TABLET BY MOUTH TWICE A DAY, Disp: 90 tablet, Rfl: 1    montelukast (SINGULAIR) 10 MG tablet, Take 1 tablet by mouth Every Night., Disp: , Rfl:     omeprazole (priLOSEC) 40 MG capsule, Take 1 capsule by mouth 2 (Two) Times a Day., Disp: , Rfl:     acetaminophen (TYLENOL) 500 MG tablet, Take 1 tablet by mouth Every 6 (Six) Hours As Needed for Mild Pain., Disp: , Rfl:     ALBUTEROL IN, Inhale As Needed (WHEEZING)., Disp: , Rfl:     aspirin 325 MG EC tablet, Take 1 tablet by mouth 2 (Two) Times a Day With Meals., Disp: 60 tablet, Rfl: 0    calcium carbonate (TUMS) 500 MG chewable tablet, Chew 1 tablet Daily., Disp: , Rfl:     docusate sodium (COLACE) 250 MG capsule, Take 1 capsule by mouth 2 (Two) Times a Day As Needed for Constipation., Disp: 30 capsule, Rfl: 1    nitroglycerin (NITROSTAT) 0.4 MG SL tablet, 1 under the tongue as needed for angina, may repeat q5mins for up three doses, Disp: 25 tablet, Rfl: 3    oxyCODONE-acetaminophen (Percocet)  MG per tablet, Take 1-2 tablets by mouth Every 4 (Four) Hours As Needed for Moderate Pain, Disp: 42 tablet, Rfl: 0    triamterene-hydrochlorothiazide (DYAZIDE) 37.5-25 MG per capsule, TAKE ONE CAPSULE BY MOUTH EVERY MORNING, Disp: 30 capsule, Rfl: 3    triamterene-hydrochlorothiazide (DYAZIDE) 37.5-25 MG per  capsule, Take 1 capsule by mouth Every Morning., Disp: 30 capsule, Rfl: 3   Assessment:        Patient Active Problem List   Diagnosis    Essential hypertension    Class 1 obesity due to excess calories without serious comorbidity with body mass index (BMI) of 30.0 to 30.9 in adult    Mixed hyperlipidemia    Abnormal cardiac function test    Coronary artery disease involving native coronary artery of native heart without angina pectoris    Abnormal electrocardiogram    Preoperative examination, unspecified    Primary osteoarthritis of right knee               Plan:            ICD-10-CM ICD-9-CM   1. Coronary artery disease involving native coronary artery of native heart without angina pectoris  I25.10 414.01   2. Essential hypertension  I10 401.9   3. Morbid obesity with BMI of 45.0-49.9, adult  E66.01 278.01    Z68.42 V85.42   4. Mixed hyperlipidemia  E78.2 272.2   5. Preoperative examination, unspecified  Z01.818 V72.84     1. Coronary artery disease involving native coronary artery of native heart without angina pectoris  No angina pectoris    2. Essential hypertension  Pressure under control    3. Morbid obesity with BMI of 45.0-49.9, adult  Counseling done    4. Mixed hyperlipidemia  Continue current treatment    5. Preoperative examination, unspecified  Give for the surgery       Specificity and sensitivity of the stress test/ cardiac workup has been explained. Pt has been explained if  Symptoms continue please go to ER, and further w/p will be required.    Also explained this does not rule out coronary artery disease or the future events, continue to emphasize on risk reductions for coronary artery disease    Pt also advised to contact PCP for other causes of symptoms    Armando Dixon Jr. seen and examined with no clinical signs of angina or chf, pt is cleared for surgery with non modifiable risk factors.  Armando Dixon Jr. has been advised to take cardiac meds with sip of water on the day of  surgery.    Please use beta blocker for tachycardia perioperatively    Anticoagulation to be managed appropriately    Watch for chest pain, shortness of breath, palpitations, arrhythmias, and significant change in the blood pressure perioperatively,     Please check EKG preop and postop if any questions, notify us if any change in patient's cardiovascular conditions    COUNSELING:    Armando Dixon Jr.Counseling was given to patient for the following topics: diagnostic results, risk factor reductions, impressions, risks and benefits of treatment options and importance of treatment compliance .       SMOKING COUNSELING:        Dictated using Dragon dictation

## 2023-05-26 ENCOUNTER — HOSPITAL ENCOUNTER (OUTPATIENT)
Dept: GENERAL RADIOLOGY | Facility: HOSPITAL | Age: 68
Discharge: HOME | End: 2023-05-26
Payer: MEDICARE

## 2023-05-26 ENCOUNTER — PRE-ADMISSION TESTING (OUTPATIENT)
Dept: PREADMISSION TESTING | Facility: HOSPITAL | Age: 68
End: 2023-05-26
Payer: MEDICARE

## 2023-05-26 VITALS
HEIGHT: 66 IN | WEIGHT: 286.9 LBS | OXYGEN SATURATION: 96 % | HEART RATE: 74 BPM | RESPIRATION RATE: 16 BRPM | DIASTOLIC BLOOD PRESSURE: 58 MMHG | BODY MASS INDEX: 46.11 KG/M2 | SYSTOLIC BLOOD PRESSURE: 105 MMHG | TEMPERATURE: 98.3 F

## 2023-05-26 LAB
ALBUMIN SERPL-MCNC: 4 G/DL (ref 3.5–5.2)
ALBUMIN/GLOB SERPL: 1.5 G/DL
ALP SERPL-CCNC: 130 U/L (ref 39–117)
ALT SERPL W P-5'-P-CCNC: 16 U/L (ref 1–41)
ANION GAP SERPL CALCULATED.3IONS-SCNC: 10 MMOL/L (ref 5–15)
AST SERPL-CCNC: 21 U/L (ref 1–40)
BACTERIA UR QL AUTO: ABNORMAL /HPF
BILIRUB SERPL-MCNC: 0.3 MG/DL (ref 0–1.2)
BILIRUB UR QL STRIP: NEGATIVE
BUN SERPL-MCNC: 20 MG/DL (ref 8–23)
BUN/CREAT SERPL: 14.4 (ref 7–25)
CALCIUM SPEC-SCNC: 9.3 MG/DL (ref 8.6–10.5)
CHLORIDE SERPL-SCNC: 101 MMOL/L (ref 98–107)
CLARITY UR: CLEAR
CO2 SERPL-SCNC: 27 MMOL/L (ref 22–29)
COLOR UR: YELLOW
CREAT SERPL-MCNC: 1.39 MG/DL (ref 0.76–1.27)
DEPRECATED RDW RBC AUTO: 39.4 FL (ref 37–54)
EGFRCR SERPLBLD CKD-EPI 2021: 55.9 ML/MIN/1.73
ERYTHROCYTE [DISTWIDTH] IN BLOOD BY AUTOMATED COUNT: 12.5 % (ref 12.3–15.4)
GLOBULIN UR ELPH-MCNC: 2.6 GM/DL
GLUCOSE SERPL-MCNC: 146 MG/DL (ref 65–99)
GLUCOSE UR STRIP-MCNC: NEGATIVE MG/DL
HCT VFR BLD AUTO: 41.7 % (ref 37.5–51)
HGB BLD-MCNC: 13.6 G/DL (ref 13–17.7)
HGB UR QL STRIP.AUTO: NEGATIVE
HYALINE CASTS UR QL AUTO: ABNORMAL /LPF
INR PPP: 1.01 (ref 0.9–1.1)
KETONES UR QL STRIP: ABNORMAL
LEUKOCYTE ESTERASE UR QL STRIP.AUTO: ABNORMAL
MCH RBC QN AUTO: 28.3 PG (ref 26.6–33)
MCHC RBC AUTO-ENTMCNC: 32.6 G/DL (ref 31.5–35.7)
MCV RBC AUTO: 86.7 FL (ref 79–97)
NITRITE UR QL STRIP: NEGATIVE
PH UR STRIP.AUTO: 5.5 [PH] (ref 5–8)
PLATELET # BLD AUTO: 219 10*3/MM3 (ref 140–450)
PMV BLD AUTO: 9.7 FL (ref 6–12)
POTASSIUM SERPL-SCNC: 3.9 MMOL/L (ref 3.5–5.2)
PROT SERPL-MCNC: 6.6 G/DL (ref 6–8.5)
PROT UR QL STRIP: NEGATIVE
PROTHROMBIN TIME: 13.4 SECONDS (ref 11.7–14.2)
RBC # BLD AUTO: 4.81 10*6/MM3 (ref 4.14–5.8)
RBC # UR STRIP: ABNORMAL /HPF
REF LAB TEST METHOD: ABNORMAL
SODIUM SERPL-SCNC: 138 MMOL/L (ref 136–145)
SP GR UR STRIP: 1.02 (ref 1–1.03)
SQUAMOUS #/AREA URNS HPF: ABNORMAL /HPF
UROBILINOGEN UR QL STRIP: ABNORMAL
WBC # UR STRIP: ABNORMAL /HPF
WBC NRBC COR # BLD: 7.4 10*3/MM3 (ref 3.4–10.8)

## 2023-05-26 PROCEDURE — 85027 COMPLETE CBC AUTOMATED: CPT

## 2023-05-26 PROCEDURE — 85610 PROTHROMBIN TIME: CPT

## 2023-05-26 PROCEDURE — 80053 COMPREHEN METABOLIC PANEL: CPT

## 2023-05-26 PROCEDURE — 81001 URINALYSIS AUTO W/SCOPE: CPT

## 2023-05-26 PROCEDURE — 71046 X-RAY EXAM CHEST 2 VIEWS: CPT

## 2023-05-26 PROCEDURE — 36415 COLL VENOUS BLD VENIPUNCTURE: CPT

## 2023-05-26 PROCEDURE — 73560 X-RAY EXAM OF KNEE 1 OR 2: CPT

## 2023-05-26 RX ORDER — CHLORHEXIDINE GLUCONATE 500 MG/1
CLOTH TOPICAL TAKE AS DIRECTED
COMMUNITY

## 2023-05-26 RX ORDER — ACETAMINOPHEN 500 MG
500 TABLET ORAL EVERY 6 HOURS PRN
COMMUNITY

## 2023-05-26 RX ORDER — CALCIUM CARBONATE 500 MG/1
1 TABLET, CHEWABLE ORAL DAILY
COMMUNITY

## 2023-05-26 NOTE — DISCHARGE INSTRUCTIONS
Take the following medications the morning of surgery: DULOXETINE, METOPROLOL, OMEPRAZOLE, LAMOTRIGINE, GABAPENTIN, HYDROCODONE    CLARIFY WITH SURGEON WHEN TO STOP YOUR ASPIRIN AND DICLOFENAC    ARRIVAL TIME WILL BE CALLED TO YOUR THE DAY PRIOR TO SURGERY BY THE SURGERY DESK      If you are on prescription narcotic pain medication to control your pain you may also take that medication the morning of surgery.    General Instructions:  Do not eat solid food after midnight the night before surgery.  You may drink clear liquids day of surgery but must stop at least one hour before your hospital arrival time.  It is beneficial for you to have a clear drink that contains carbohydrates the day of surgery.  We suggest a 12 to 20 ounce bottle of Gatorade or Powerade for non-diabetic patients or a 12 to 20 ounce bottle of G2 or Powerade Zero for diabetic patients. (Pediatric patients, are not advised to drink a 12 to 20 ounce carbohydrate drink)    Clear liquids are liquids you can see through.  Nothing red in color.     Plain water                               Sports drinks  Sodas                                   Gelatin (Jell-O)  Fruit juices without pulp such as white grape juice and apple juice  Popsicles that contain no fruit or yogurt  Tea or coffee (no cream or milk added)  Gatorade / Powerade  G2 / Powerade Zero    Patients who avoid smoking, chewing tobacco and alcohol for 4 weeks prior to surgery have a reduced risk of post-operative complications.  Quit smoking as many days before surgery as you can.  Do not smoke, use chewing tobacco or drink alcohol the day of surgery.   If applicable bring your C-PAP/ BI-PAP machine in with you to preop day of surgery.  Bring any papers given to you in the doctor’s office.  Wear clean comfortable clothes.  Do not wear contact lenses, false eyelashes or make-up.  Bring a case for your glasses.   Bring crutches or walker if applicable.  Remove all piercings.  Leave jewelry and  any other valuables at home.  Hair extensions with metal clips must be removed prior to surgery.  The Pre-Admission Testing nurse will instruct you to bring medications if unable to obtain an accurate list in Pre-Admission Testing.        Preventing a Surgical Site Infection:  For 2 to 3 days before surgery, avoid shaving with a razor because the razor can irritate skin and make it easier to develop an infection.    Any areas of open skin can increase the risk of a post-operative wound infection by allowing bacteria to enter and travel throughout the body.  Notify your surgeon if you have any skin wounds / rashes even if it is not near the expected surgical site.  The area will need assessed to determine if surgery should be delayed until it is healed.  The night prior to surgery shower using a fresh bar of anti-bacterial soap (such as Dial) and clean washcloth.  Sleep in a clean bed with clean clothing.  Do not allow pets to sleep with you.  Shower on the morning of surgery using a fresh bar of anti-bacterial soap (such as Dial) and clean washcloth.  Dry with a clean towel and dress in clean clothing.      CHLORHEXIDINE CLOTH INSTRUCTIONS  The morning of surgery follow these instructions using the Chlorhexidine cloths you've been given.  These steps reduce bacteria on the body.  Do not use the cloths near your eyes, ears mouth, genitalia or on open wounds.  Throw the cloths away after use but do not try to flush them down a toilet.      Open and remove one cloth at a time from the package.    Leave the cloth unfolded and begin the bathing.  Massage the skin with the cloths using gentle pressure to remove bacteria.  Do not scrub harshly.   Follow the steps below with one 2% CHG cloth per area (6 total cloths).  One cloth for neck, shoulders and chest.  One cloth for both arms, hands, fingers and underarms (do underarms last).  One cloth for the abdomen followed by groin.  One cloth for right leg and foot including  between the toes.  One cloth for left leg and foot including between the toes.  The last cloth is to be used for the back of the neck, back and buttocks.    Allow the CHG to air dry 3 minutes on the skin which will give it time to work and decrease the chance of irritation.  The skin may feel sticky until it is dry.  Do not rinse with water or any other liquid or you will lose the beneficial effects of the CHG.  If mild skin irritation occurs, do rinse the skin to remove the CHG.  Report this to the nurse at time of admission.  Do not apply lotions, creams, ointments, deodorants or perfumes after using the cloths. Dress in clean clothes before coming to the hospital.   Ask your surgeon if you will be receiving antibiotics prior to surgery.  Make sure you, your family, and all healthcare providers clean their hands with soap and water or an alcohol based hand  before caring for you or your wound.    Day of surgery:  Your arrival time is approximately two hours before your scheduled surgery time.  Upon arrival, a Pre-op nurse and Anesthesiologist will review your health history, obtain vital signs, and answer questions you may have.  The only belongings needed at this time will be a list of your home medications and if applicable your C-PAP/BI-PAP machine.  A Pre-op nurse will start an IV and you may receive medication in preparation for surgery, including something to help you relax.     Please be aware that surgery does come with discomfort.  We want to make every effort to control your discomfort so please discuss any uncontrolled symptoms with your nurse.   Your doctor will most likely have prescribed pain medications.      If you are going home after surgery you will receive individualized written care instructions before being discharged.  A responsible adult must drive you to and from the hospital on the day of your surgery and stay with you for 24 hours.  Discharge prescriptions can be filled by the  hospital pharmacy during regular pharmacy hours.  If you are having surgery late in the day/evening your prescription may be e-prescribed to your pharmacy.  Please verify your pharmacy hours or chose a 24 hour pharmacy to avoid not having access to your prescription because your pharmacy has closed for the day.    If you are staying overnight following surgery, you will be transported to your hospital room following the recovery period.  [facility] has all private rooms.    If you have any questions please call Pre-Admission Testing at [phone].  Deductibles and co-payments are collected on the day of service. Please be prepared to pay the required co-pay, deductible or deposit on the day of service as defined by your plan.    Call your surgeon immediately if you experience any of the following symptoms:  Sore Throat  Shortness of Breath or difficulty breathing  Cough  Chills  Body soreness or muscle pain  Headache  Fever  New loss of taste or smell  Do not arrive for your surgery ill.  Your procedure will need to be rescheduled to another time.  You will need to call your physician before the day of surgery to avoid any unnecessary exposure to hospital staff as well as other patients.

## 2023-05-31 RX ORDER — TRIAMTERENE AND HYDROCHLOROTHIAZIDE 37.5; 25 MG/1; MG/1
1 CAPSULE ORAL EVERY MORNING
Qty: 30 CAPSULE | Refills: 3 | Status: SHIPPED | OUTPATIENT
Start: 2023-05-31

## 2023-06-06 ENCOUNTER — HOSPITAL ENCOUNTER (OUTPATIENT)
Facility: HOSPITAL | Age: 68
Discharge: HOME | End: 2023-06-07
Attending: ORTHOPAEDIC SURGERY | Admitting: ORTHOPAEDIC SURGERY
Payer: MEDICARE

## 2023-06-06 ENCOUNTER — APPOINTMENT (OUTPATIENT)
Dept: GENERAL RADIOLOGY | Facility: HOSPITAL | Age: 68
End: 2023-06-06
Payer: MEDICARE

## 2023-06-06 ENCOUNTER — ANESTHESIA (OUTPATIENT)
Dept: PERIOP | Facility: HOSPITAL | Age: 68
End: 2023-06-06
Payer: MEDICARE

## 2023-06-06 ENCOUNTER — ANESTHESIA EVENT (OUTPATIENT)
Dept: PERIOP | Facility: HOSPITAL | Age: 68
End: 2023-06-06
Payer: MEDICARE

## 2023-06-06 DIAGNOSIS — M17.11 PRIMARY OSTEOARTHRITIS OF RIGHT KNEE: Primary | ICD-10-CM

## 2023-06-06 PROCEDURE — 25010000002 SUGAMMADEX 200 MG/2ML SOLUTION

## 2023-06-06 PROCEDURE — 25010000002 DEXAMETHASONE PER 1 MG: Performed by: STUDENT IN AN ORGANIZED HEALTH CARE EDUCATION/TRAINING PROGRAM

## 2023-06-06 PROCEDURE — 97110 THERAPEUTIC EXERCISES: CPT

## 2023-06-06 PROCEDURE — G0378 HOSPITAL OBSERVATION PER HR: HCPCS

## 2023-06-06 PROCEDURE — 25010000002 HYDROMORPHONE PER 4 MG

## 2023-06-06 PROCEDURE — 25010000002 ROPIVACAINE PER 1 MG: Performed by: ORTHOPAEDIC SURGERY

## 2023-06-06 PROCEDURE — 97161 PT EVAL LOW COMPLEX 20 MIN: CPT

## 2023-06-06 PROCEDURE — C1713 ANCHOR/SCREW BN/BN,TIS/BN: HCPCS | Performed by: ORTHOPAEDIC SURGERY

## 2023-06-06 PROCEDURE — 25010000002 FENTANYL CITRATE (PF) 50 MCG/ML SOLUTION

## 2023-06-06 PROCEDURE — 25010000002 FENTANYL CITRATE (PF) 50 MCG/ML SOLUTION: Performed by: STUDENT IN AN ORGANIZED HEALTH CARE EDUCATION/TRAINING PROGRAM

## 2023-06-06 PROCEDURE — 25010000002 MORPHINE PER 10 MG: Performed by: ORTHOPAEDIC SURGERY

## 2023-06-06 PROCEDURE — 25010000002 KETOROLAC TROMETHAMINE PER 15 MG: Performed by: ORTHOPAEDIC SURGERY

## 2023-06-06 PROCEDURE — 25010000002 DEXAMETHASONE SODIUM PHOSPHATE 20 MG/5ML SOLUTION

## 2023-06-06 PROCEDURE — 25010000002 ONDANSETRON PER 1 MG

## 2023-06-06 PROCEDURE — 97530 THERAPEUTIC ACTIVITIES: CPT

## 2023-06-06 PROCEDURE — 25010000002 ROPIVACAINE PER 1 MG: Performed by: STUDENT IN AN ORGANIZED HEALTH CARE EDUCATION/TRAINING PROGRAM

## 2023-06-06 PROCEDURE — C1776 JOINT DEVICE (IMPLANTABLE): HCPCS | Performed by: ORTHOPAEDIC SURGERY

## 2023-06-06 PROCEDURE — 73560 X-RAY EXAM OF KNEE 1 OR 2: CPT

## 2023-06-06 PROCEDURE — 25010000002 HYDROMORPHONE 1 MG/ML SOLUTION

## 2023-06-06 PROCEDURE — 25010000002 EPINEPHRINE 1 MG/ML SOLUTION 30 ML VIAL: Performed by: ORTHOPAEDIC SURGERY

## 2023-06-06 PROCEDURE — 25010000002 CEFAZOLIN PER 500 MG: Performed by: ORTHOPAEDIC SURGERY

## 2023-06-06 PROCEDURE — 25010000002 PROPOFOL 10 MG/ML EMULSION

## 2023-06-06 PROCEDURE — 25010000002 FENTANYL CITRATE (PF) 100 MCG/2ML SOLUTION

## 2023-06-06 PROCEDURE — 25010000002 MIDAZOLAM PER 1 MG: Performed by: STUDENT IN AN ORGANIZED HEALTH CARE EDUCATION/TRAINING PROGRAM

## 2023-06-06 DEVICE — CAP TOTL KN CMT PRIMARY: Type: IMPLANTABLE DEVICE | Site: KNEE | Status: FUNCTIONAL

## 2023-06-06 DEVICE — CMT BONE R 1X40: Type: IMPLANTABLE DEVICE | Site: KNEE | Status: FUNCTIONAL

## 2023-06-06 DEVICE — TRY TIB INTERLOK PRI 75MM: Type: IMPLANTABLE DEVICE | Site: KNEE | Status: FUNCTIONAL

## 2023-06-06 DEVICE — IMPLANTABLE DEVICE
Type: IMPLANTABLE DEVICE | Site: KNEE | Status: FUNCTIONAL
Brand: VANGUARD® KNEE SYSTEM

## 2023-06-06 DEVICE — COMP FEM/KN VANGUARD INTLK CR 65MM NS RT: Type: IMPLANTABLE DEVICE | Site: KNEE | Status: FUNCTIONAL

## 2023-06-06 DEVICE — STEM TIB PRI FINN 46X40MM: Type: IMPLANTABLE DEVICE | Site: KNEE | Status: FUNCTIONAL

## 2023-06-06 RX ORDER — PROPOFOL 10 MG/ML
VIAL (ML) INTRAVENOUS AS NEEDED
Status: DISCONTINUED | OUTPATIENT
Start: 2023-06-06 | End: 2023-06-06 | Stop reason: SURG

## 2023-06-06 RX ORDER — ASPIRIN 325 MG
325 TABLET, DELAYED RELEASE (ENTERIC COATED) ORAL 2 TIMES DAILY WITH MEALS
Qty: 60 TABLET | Refills: 0 | Status: SHIPPED | OUTPATIENT
Start: 2023-06-07

## 2023-06-06 RX ORDER — SODIUM CHLORIDE 0.9 % (FLUSH) 0.9 %
10 SYRINGE (ML) INJECTION EVERY 12 HOURS SCHEDULED
Status: DISCONTINUED | OUTPATIENT
Start: 2023-06-06 | End: 2023-06-07 | Stop reason: HOSPADM

## 2023-06-06 RX ORDER — SODIUM CHLORIDE, SODIUM LACTATE, POTASSIUM CHLORIDE, CALCIUM CHLORIDE 600; 310; 30; 20 MG/100ML; MG/100ML; MG/100ML; MG/100ML
9 INJECTION, SOLUTION INTRAVENOUS CONTINUOUS
Status: DISCONTINUED | OUTPATIENT
Start: 2023-06-06 | End: 2023-06-07 | Stop reason: HOSPADM

## 2023-06-06 RX ORDER — DEXAMETHASONE SODIUM PHOSPHATE 4 MG/ML
INJECTION, SOLUTION INTRA-ARTICULAR; INTRALESIONAL; INTRAMUSCULAR; INTRAVENOUS; SOFT TISSUE AS NEEDED
Status: DISCONTINUED | OUTPATIENT
Start: 2023-06-06 | End: 2023-06-06 | Stop reason: SURG

## 2023-06-06 RX ORDER — LIDOCAINE HYDROCHLORIDE 20 MG/ML
INJECTION, SOLUTION EPIDURAL; INFILTRATION; INTRACAUDAL; PERINEURAL AS NEEDED
Status: DISCONTINUED | OUTPATIENT
Start: 2023-06-06 | End: 2023-06-06 | Stop reason: SURG

## 2023-06-06 RX ORDER — LIDOCAINE HYDROCHLORIDE 10 MG/ML
0.5 INJECTION, SOLUTION EPIDURAL; INFILTRATION; INTRACAUDAL; PERINEURAL ONCE AS NEEDED
Status: DISCONTINUED | OUTPATIENT
Start: 2023-06-06 | End: 2023-06-06 | Stop reason: HOSPADM

## 2023-06-06 RX ORDER — ACETAMINOPHEN 325 MG/1
325 TABLET ORAL EVERY 4 HOURS PRN
Status: DISCONTINUED | OUTPATIENT
Start: 2023-06-06 | End: 2023-06-07 | Stop reason: HOSPADM

## 2023-06-06 RX ORDER — DOCUSATE SODIUM 250 MG
250 CAPSULE ORAL 2 TIMES DAILY PRN
Qty: 30 CAPSULE | Refills: 1 | Status: SHIPPED | OUTPATIENT
Start: 2023-06-06

## 2023-06-06 RX ORDER — ATORVASTATIN CALCIUM 20 MG/1
20 TABLET, FILM COATED ORAL DAILY
Status: DISCONTINUED | OUTPATIENT
Start: 2023-06-07 | End: 2023-06-07 | Stop reason: HOSPADM

## 2023-06-06 RX ORDER — DOCUSATE SODIUM 100 MG/1
100 CAPSULE, LIQUID FILLED ORAL 2 TIMES DAILY PRN
Status: DISCONTINUED | OUTPATIENT
Start: 2023-06-06 | End: 2023-06-07 | Stop reason: HOSPADM

## 2023-06-06 RX ORDER — ACETAMINOPHEN 500 MG
1000 TABLET ORAL ONCE
Status: COMPLETED | OUTPATIENT
Start: 2023-06-06 | End: 2023-06-06

## 2023-06-06 RX ORDER — ASPIRIN 325 MG
325 TABLET, DELAYED RELEASE (ENTERIC COATED) ORAL 2 TIMES DAILY WITH MEALS
Status: DISCONTINUED | OUTPATIENT
Start: 2023-06-07 | End: 2023-06-07 | Stop reason: HOSPADM

## 2023-06-06 RX ORDER — FENTANYL CITRATE 50 UG/ML
INJECTION, SOLUTION INTRAMUSCULAR; INTRAVENOUS AS NEEDED
Status: DISCONTINUED | OUTPATIENT
Start: 2023-06-06 | End: 2023-06-06 | Stop reason: SURG

## 2023-06-06 RX ORDER — SODIUM CHLORIDE 0.9 % (FLUSH) 0.9 %
1-10 SYRINGE (ML) INJECTION AS NEEDED
Status: DISCONTINUED | OUTPATIENT
Start: 2023-06-06 | End: 2023-06-07 | Stop reason: HOSPADM

## 2023-06-06 RX ORDER — ONDANSETRON 2 MG/ML
4 INJECTION INTRAMUSCULAR; INTRAVENOUS EVERY 6 HOURS PRN
Status: DISCONTINUED | OUTPATIENT
Start: 2023-06-06 | End: 2023-06-07 | Stop reason: HOSPADM

## 2023-06-06 RX ORDER — NALOXONE HCL 0.4 MG/ML
0.2 VIAL (ML) INJECTION AS NEEDED
Status: DISCONTINUED | OUTPATIENT
Start: 2023-06-06 | End: 2023-06-06 | Stop reason: HOSPADM

## 2023-06-06 RX ORDER — CEFAZOLIN SODIUM IN 0.9 % NACL 3 G/100 ML
3 INTRAVENOUS SOLUTION, PIGGYBACK (ML) INTRAVENOUS ONCE
Status: COMPLETED | OUTPATIENT
Start: 2023-06-06 | End: 2023-06-06

## 2023-06-06 RX ORDER — GLYCOPYRROLATE 0.2 MG/ML
INJECTION INTRAMUSCULAR; INTRAVENOUS AS NEEDED
Status: DISCONTINUED | OUTPATIENT
Start: 2023-06-06 | End: 2023-06-06 | Stop reason: SURG

## 2023-06-06 RX ORDER — ROCURONIUM BROMIDE 10 MG/ML
INJECTION, SOLUTION INTRAVENOUS AS NEEDED
Status: DISCONTINUED | OUTPATIENT
Start: 2023-06-06 | End: 2023-06-06 | Stop reason: SURG

## 2023-06-06 RX ORDER — PROMETHAZINE HYDROCHLORIDE 12.5 MG/1
12.5 TABLET ORAL EVERY 6 HOURS PRN
Status: DISCONTINUED | OUTPATIENT
Start: 2023-06-06 | End: 2023-06-07 | Stop reason: HOSPADM

## 2023-06-06 RX ORDER — SODIUM CHLORIDE 450 MG/100ML
100 INJECTION, SOLUTION INTRAVENOUS CONTINUOUS
Status: DISCONTINUED | OUTPATIENT
Start: 2023-06-06 | End: 2023-06-07 | Stop reason: HOSPADM

## 2023-06-06 RX ORDER — CYCLOBENZAPRINE HCL 10 MG
10 TABLET ORAL 2 TIMES DAILY PRN
Status: DISCONTINUED | OUTPATIENT
Start: 2023-06-06 | End: 2023-06-07 | Stop reason: HOSPADM

## 2023-06-06 RX ORDER — PROMETHAZINE HYDROCHLORIDE 25 MG/1
25 SUPPOSITORY RECTAL ONCE AS NEEDED
Status: DISCONTINUED | OUTPATIENT
Start: 2023-06-06 | End: 2023-06-06 | Stop reason: HOSPADM

## 2023-06-06 RX ORDER — IPRATROPIUM BROMIDE AND ALBUTEROL SULFATE 2.5; .5 MG/3ML; MG/3ML
3 SOLUTION RESPIRATORY (INHALATION) ONCE AS NEEDED
Status: DISCONTINUED | OUTPATIENT
Start: 2023-06-06 | End: 2023-06-06 | Stop reason: HOSPADM

## 2023-06-06 RX ORDER — EPHEDRINE SULFATE 50 MG/ML
INJECTION INTRAVENOUS AS NEEDED
Status: DISCONTINUED | OUTPATIENT
Start: 2023-06-06 | End: 2023-06-06 | Stop reason: SURG

## 2023-06-06 RX ORDER — OXYCODONE AND ACETAMINOPHEN 10; 325 MG/1; MG/1
1 TABLET ORAL EVERY 4 HOURS PRN
Status: DISCONTINUED | OUTPATIENT
Start: 2023-06-06 | End: 2023-06-07 | Stop reason: HOSPADM

## 2023-06-06 RX ORDER — CHOLECALCIFEROL (VITAMIN D3) 125 MCG
5 CAPSULE ORAL NIGHTLY PRN
Status: DISCONTINUED | OUTPATIENT
Start: 2023-06-06 | End: 2023-06-07 | Stop reason: HOSPADM

## 2023-06-06 RX ORDER — DOXEPIN HYDROCHLORIDE 25 MG/1
50 CAPSULE ORAL NIGHTLY PRN
Status: DISCONTINUED | OUTPATIENT
Start: 2023-06-06 | End: 2023-06-07 | Stop reason: HOSPADM

## 2023-06-06 RX ORDER — NITROGLYCERIN 0.4 MG/1
0.4 TABLET SUBLINGUAL
Status: DISCONTINUED | OUTPATIENT
Start: 2023-06-06 | End: 2023-06-07 | Stop reason: HOSPADM

## 2023-06-06 RX ORDER — CALCIUM CARBONATE 500 MG/1
1 TABLET, CHEWABLE ORAL DAILY
Status: DISCONTINUED | OUTPATIENT
Start: 2023-06-06 | End: 2023-06-07 | Stop reason: HOSPADM

## 2023-06-06 RX ORDER — OXYCODONE AND ACETAMINOPHEN 10; 325 MG/1; MG/1
1-2 TABLET ORAL EVERY 4 HOURS PRN
Qty: 42 TABLET | Refills: 0 | Status: SHIPPED | OUTPATIENT
Start: 2023-06-06

## 2023-06-06 RX ORDER — ONDANSETRON 2 MG/ML
INJECTION INTRAMUSCULAR; INTRAVENOUS AS NEEDED
Status: DISCONTINUED | OUTPATIENT
Start: 2023-06-06 | End: 2023-06-06 | Stop reason: SURG

## 2023-06-06 RX ORDER — FENTANYL CITRATE 50 UG/ML
25 INJECTION, SOLUTION INTRAMUSCULAR; INTRAVENOUS
Status: DISCONTINUED | OUTPATIENT
Start: 2023-06-06 | End: 2023-06-06 | Stop reason: HOSPADM

## 2023-06-06 RX ORDER — FLUMAZENIL 0.1 MG/ML
0.2 INJECTION INTRAVENOUS AS NEEDED
Status: DISCONTINUED | OUTPATIENT
Start: 2023-06-06 | End: 2023-06-06 | Stop reason: HOSPADM

## 2023-06-06 RX ORDER — MAGNESIUM HYDROXIDE 1200 MG/15ML
LIQUID ORAL AS NEEDED
Status: DISCONTINUED | OUTPATIENT
Start: 2023-06-06 | End: 2023-06-06 | Stop reason: HOSPADM

## 2023-06-06 RX ORDER — HYDROMORPHONE HYDROCHLORIDE 1 MG/ML
0.25 INJECTION, SOLUTION INTRAMUSCULAR; INTRAVENOUS; SUBCUTANEOUS
Status: DISCONTINUED | OUTPATIENT
Start: 2023-06-06 | End: 2023-06-06 | Stop reason: HOSPADM

## 2023-06-06 RX ORDER — DIPHENHYDRAMINE HYDROCHLORIDE 50 MG/ML
12.5 INJECTION INTRAMUSCULAR; INTRAVENOUS EVERY 6 HOURS PRN
Status: DISCONTINUED | OUTPATIENT
Start: 2023-06-06 | End: 2023-06-07 | Stop reason: HOSPADM

## 2023-06-06 RX ORDER — TRIAMTERENE AND HYDROCHLOROTHIAZIDE 37.5; 25 MG/1; MG/1
1 TABLET ORAL DAILY
Status: DISCONTINUED | OUTPATIENT
Start: 2023-06-07 | End: 2023-06-07 | Stop reason: HOSPADM

## 2023-06-06 RX ORDER — SODIUM CHLORIDE 0.9 % (FLUSH) 0.9 %
3 SYRINGE (ML) INJECTION EVERY 12 HOURS SCHEDULED
Status: DISCONTINUED | OUTPATIENT
Start: 2023-06-06 | End: 2023-06-06 | Stop reason: HOSPADM

## 2023-06-06 RX ORDER — ROPIVACAINE HYDROCHLORIDE 5 MG/ML
INJECTION, SOLUTION EPIDURAL; INFILTRATION; PERINEURAL
Status: COMPLETED | OUTPATIENT
Start: 2023-06-06 | End: 2023-06-06

## 2023-06-06 RX ORDER — PANTOPRAZOLE SODIUM 40 MG/1
40 TABLET, DELAYED RELEASE ORAL
Status: DISCONTINUED | OUTPATIENT
Start: 2023-06-07 | End: 2023-06-07 | Stop reason: HOSPADM

## 2023-06-06 RX ORDER — SODIUM CHLORIDE 0.9 % (FLUSH) 0.9 %
3-10 SYRINGE (ML) INJECTION AS NEEDED
Status: DISCONTINUED | OUTPATIENT
Start: 2023-06-06 | End: 2023-06-06 | Stop reason: HOSPADM

## 2023-06-06 RX ORDER — KETOROLAC TROMETHAMINE 15 MG/ML
15 INJECTION, SOLUTION INTRAMUSCULAR; INTRAVENOUS EVERY 6 HOURS PRN
Status: DISCONTINUED | OUTPATIENT
Start: 2023-06-06 | End: 2023-06-07 | Stop reason: HOSPADM

## 2023-06-06 RX ORDER — FERROUS SULFATE 325(65) MG
325 TABLET ORAL
Status: DISCONTINUED | OUTPATIENT
Start: 2023-06-07 | End: 2023-06-07 | Stop reason: HOSPADM

## 2023-06-06 RX ORDER — ACETAMINOPHEN 500 MG
500 TABLET ORAL EVERY 6 HOURS PRN
Status: DISCONTINUED | OUTPATIENT
Start: 2023-06-06 | End: 2023-06-06 | Stop reason: SDUPTHER

## 2023-06-06 RX ORDER — DEXAMETHASONE SODIUM PHOSPHATE 4 MG/ML
INJECTION, SOLUTION INTRA-ARTICULAR; INTRALESIONAL; INTRAMUSCULAR; INTRAVENOUS; SOFT TISSUE
Status: COMPLETED | OUTPATIENT
Start: 2023-06-06 | End: 2023-06-06

## 2023-06-06 RX ORDER — DROPERIDOL 2.5 MG/ML
0.62 INJECTION, SOLUTION INTRAMUSCULAR; INTRAVENOUS
Status: DISCONTINUED | OUTPATIENT
Start: 2023-06-06 | End: 2023-06-06 | Stop reason: HOSPADM

## 2023-06-06 RX ORDER — LABETALOL HYDROCHLORIDE 5 MG/ML
5 INJECTION, SOLUTION INTRAVENOUS
Status: DISCONTINUED | OUTPATIENT
Start: 2023-06-06 | End: 2023-06-06 | Stop reason: HOSPADM

## 2023-06-06 RX ORDER — DIPHENHYDRAMINE HYDROCHLORIDE 50 MG/ML
12.5 INJECTION INTRAMUSCULAR; INTRAVENOUS
Status: DISCONTINUED | OUTPATIENT
Start: 2023-06-06 | End: 2023-06-06 | Stop reason: HOSPADM

## 2023-06-06 RX ORDER — PROMETHAZINE HYDROCHLORIDE 25 MG/1
25 TABLET ORAL ONCE AS NEEDED
Status: DISCONTINUED | OUTPATIENT
Start: 2023-06-06 | End: 2023-06-06 | Stop reason: HOSPADM

## 2023-06-06 RX ORDER — ONDANSETRON 2 MG/ML
4 INJECTION INTRAMUSCULAR; INTRAVENOUS ONCE AS NEEDED
Status: DISCONTINUED | OUTPATIENT
Start: 2023-06-06 | End: 2023-06-06 | Stop reason: HOSPADM

## 2023-06-06 RX ORDER — DULOXETIN HYDROCHLORIDE 30 MG/1
30 CAPSULE, DELAYED RELEASE ORAL DAILY
Status: DISCONTINUED | OUTPATIENT
Start: 2023-06-07 | End: 2023-06-07 | Stop reason: HOSPADM

## 2023-06-06 RX ORDER — OXYCODONE AND ACETAMINOPHEN 10; 325 MG/1; MG/1
2 TABLET ORAL EVERY 4 HOURS PRN
Status: DISCONTINUED | OUTPATIENT
Start: 2023-06-06 | End: 2023-06-07 | Stop reason: HOSPADM

## 2023-06-06 RX ORDER — HYDRALAZINE HYDROCHLORIDE 20 MG/ML
5 INJECTION INTRAMUSCULAR; INTRAVENOUS
Status: DISCONTINUED | OUTPATIENT
Start: 2023-06-06 | End: 2023-06-06 | Stop reason: HOSPADM

## 2023-06-06 RX ORDER — EPHEDRINE SULFATE 50 MG/ML
5 INJECTION, SOLUTION INTRAVENOUS ONCE AS NEEDED
Status: DISCONTINUED | OUTPATIENT
Start: 2023-06-06 | End: 2023-06-06 | Stop reason: HOSPADM

## 2023-06-06 RX ORDER — MORPHINE SULFATE 2 MG/ML
4 INJECTION, SOLUTION INTRAMUSCULAR; INTRAVENOUS
Status: DISCONTINUED | OUTPATIENT
Start: 2023-06-06 | End: 2023-06-07 | Stop reason: HOSPADM

## 2023-06-06 RX ORDER — MIDAZOLAM HYDROCHLORIDE 1 MG/ML
0.5 INJECTION INTRAMUSCULAR; INTRAVENOUS
Status: DISCONTINUED | OUTPATIENT
Start: 2023-06-06 | End: 2023-06-06 | Stop reason: HOSPADM

## 2023-06-06 RX ORDER — CELECOXIB 200 MG/1
200 CAPSULE ORAL ONCE
Status: COMPLETED | OUTPATIENT
Start: 2023-06-06 | End: 2023-06-06

## 2023-06-06 RX ORDER — CLINDAMYCIN PHOSPHATE 900 MG/50ML
900 INJECTION INTRAVENOUS EVERY 8 HOURS
Status: COMPLETED | OUTPATIENT
Start: 2023-06-06 | End: 2023-06-06

## 2023-06-06 RX ORDER — NALOXONE HCL 0.4 MG/ML
0.4 VIAL (ML) INJECTION
Status: DISCONTINUED | OUTPATIENT
Start: 2023-06-06 | End: 2023-06-07 | Stop reason: HOSPADM

## 2023-06-06 RX ORDER — TAMSULOSIN HYDROCHLORIDE 0.4 MG/1
0.4 CAPSULE ORAL NIGHTLY
Status: DISCONTINUED | OUTPATIENT
Start: 2023-06-06 | End: 2023-06-07 | Stop reason: HOSPADM

## 2023-06-06 RX ORDER — LAMOTRIGINE 100 MG/1
200 TABLET ORAL NIGHTLY
Status: DISCONTINUED | OUTPATIENT
Start: 2023-06-06 | End: 2023-06-07 | Stop reason: HOSPADM

## 2023-06-06 RX ORDER — TRANEXAMIC ACID 100 MG/ML
INJECTION, SOLUTION INTRAVENOUS AS NEEDED
Status: DISCONTINUED | OUTPATIENT
Start: 2023-06-06 | End: 2023-06-06 | Stop reason: SURG

## 2023-06-06 RX ORDER — ONDANSETRON 4 MG/1
4 TABLET, FILM COATED ORAL EVERY 6 HOURS PRN
Status: DISCONTINUED | OUTPATIENT
Start: 2023-06-06 | End: 2023-06-07 | Stop reason: HOSPADM

## 2023-06-06 RX ORDER — GABAPENTIN 300 MG/1
600 CAPSULE ORAL EVERY 12 HOURS SCHEDULED
Status: DISCONTINUED | OUTPATIENT
Start: 2023-06-06 | End: 2023-06-07 | Stop reason: HOSPADM

## 2023-06-06 RX ORDER — HYDROCODONE BITARTRATE AND ACETAMINOPHEN 7.5; 325 MG/1; MG/1
1 TABLET ORAL EVERY 4 HOURS PRN
Status: DISCONTINUED | OUTPATIENT
Start: 2023-06-06 | End: 2023-06-06 | Stop reason: HOSPADM

## 2023-06-06 RX ORDER — DIAZEPAM 5 MG/1
5 TABLET ORAL EVERY 6 HOURS PRN
Status: DISCONTINUED | OUTPATIENT
Start: 2023-06-06 | End: 2023-06-07 | Stop reason: HOSPADM

## 2023-06-06 RX ORDER — DIPHENHYDRAMINE HCL 25 MG
25 CAPSULE ORAL EVERY 6 HOURS PRN
Status: DISCONTINUED | OUTPATIENT
Start: 2023-06-06 | End: 2023-06-07 | Stop reason: HOSPADM

## 2023-06-06 RX ORDER — SODIUM CHLORIDE 9 MG/ML
40 INJECTION, SOLUTION INTRAVENOUS AS NEEDED
Status: DISCONTINUED | OUTPATIENT
Start: 2023-06-06 | End: 2023-06-07 | Stop reason: HOSPADM

## 2023-06-06 RX ORDER — HYDROCODONE BITARTRATE AND ACETAMINOPHEN 5; 325 MG/1; MG/1
1 TABLET ORAL ONCE AS NEEDED
Status: DISCONTINUED | OUTPATIENT
Start: 2023-06-06 | End: 2023-06-06 | Stop reason: HOSPADM

## 2023-06-06 RX ORDER — FENTANYL CITRATE 50 UG/ML
50 INJECTION, SOLUTION INTRAMUSCULAR; INTRAVENOUS ONCE AS NEEDED
Status: COMPLETED | OUTPATIENT
Start: 2023-06-06 | End: 2023-06-06

## 2023-06-06 RX ADMIN — ROCURONIUM BROMIDE 20 MG: 10 INJECTION, SOLUTION INTRAVENOUS at 11:04

## 2023-06-06 RX ADMIN — PROPOFOL 25 MCG/KG/MIN: 10 INJECTION, EMULSION INTRAVENOUS at 10:31

## 2023-06-06 RX ADMIN — SODIUM CHLORIDE, POTASSIUM CHLORIDE, SODIUM LACTATE AND CALCIUM CHLORIDE: 600; 310; 30; 20 INJECTION, SOLUTION INTRAVENOUS at 11:58

## 2023-06-06 RX ADMIN — CLINDAMYCIN IN 5 PERCENT DEXTROSE 900 MG: 18 INJECTION, SOLUTION INTRAVENOUS at 16:05

## 2023-06-06 RX ADMIN — OXYCODONE HYDROCHLORIDE AND ACETAMINOPHEN 1 TABLET: 10; 325 TABLET ORAL at 16:05

## 2023-06-06 RX ADMIN — CEFAZOLIN 2 G: 10 INJECTION, POWDER, FOR SOLUTION INTRAVENOUS at 11:53

## 2023-06-06 RX ADMIN — ROCURONIUM BROMIDE 50 MG: 10 INJECTION, SOLUTION INTRAVENOUS at 10:26

## 2023-06-06 RX ADMIN — TRANEXAMIC ACID 1000 MG: 1 INJECTION, SOLUTION INTRAVENOUS at 10:31

## 2023-06-06 RX ADMIN — Medication 10 ML: at 14:38

## 2023-06-06 RX ADMIN — GABAPENTIN 600 MG: 300 CAPSULE ORAL at 20:51

## 2023-06-06 RX ADMIN — METOPROLOL TARTRATE 12.5 MG: 25 TABLET, FILM COATED ORAL at 20:50

## 2023-06-06 RX ADMIN — ACETAMINOPHEN 1000 MG: 500 TABLET ORAL at 08:30

## 2023-06-06 RX ADMIN — LIDOCAINE HYDROCHLORIDE 100 MG: 20 INJECTION, SOLUTION EPIDURAL; INFILTRATION; INTRACAUDAL; PERINEURAL at 10:25

## 2023-06-06 RX ADMIN — SODIUM CHLORIDE 100 ML/HR: 4.5 INJECTION, SOLUTION INTRAVENOUS at 14:38

## 2023-06-06 RX ADMIN — ROPIVACAINE HYDROCHLORIDE 14 ML: 5 INJECTION, SOLUTION EPIDURAL; INFILTRATION; PERINEURAL at 09:38

## 2023-06-06 RX ADMIN — FENTANYL CITRATE 25 MCG: 50 INJECTION, SOLUTION INTRAMUSCULAR; INTRAVENOUS at 10:41

## 2023-06-06 RX ADMIN — HYDROMORPHONE HYDROCHLORIDE 0.25 MG: 1 INJECTION, SOLUTION INTRAMUSCULAR; INTRAVENOUS; SUBCUTANEOUS at 12:38

## 2023-06-06 RX ADMIN — FENTANYL CITRATE 25 MCG: 50 INJECTION, SOLUTION INTRAMUSCULAR; INTRAVENOUS at 12:53

## 2023-06-06 RX ADMIN — LAMOTRIGINE 200 MG: 100 TABLET ORAL at 20:50

## 2023-06-06 RX ADMIN — FENTANYL CITRATE 25 MCG: 50 INJECTION, SOLUTION INTRAMUSCULAR; INTRAVENOUS at 12:47

## 2023-06-06 RX ADMIN — SODIUM CHLORIDE, POTASSIUM CHLORIDE, SODIUM LACTATE AND CALCIUM CHLORIDE 9 ML/HR: 600; 310; 30; 20 INJECTION, SOLUTION INTRAVENOUS at 08:30

## 2023-06-06 RX ADMIN — EPHEDRINE SULFATE 5 MG: 50 INJECTION INTRAVENOUS at 10:58

## 2023-06-06 RX ADMIN — DEXAMETHASONE SODIUM PHOSPHATE 4 MG: 4 INJECTION, SOLUTION INTRAMUSCULAR; INTRAVENOUS at 09:38

## 2023-06-06 RX ADMIN — OXYCODONE HYDROCHLORIDE AND ACETAMINOPHEN 1 TABLET: 10; 325 TABLET ORAL at 20:50

## 2023-06-06 RX ADMIN — HYDROCODONE BITARTRATE AND ACETAMINOPHEN 1 TABLET: 7.5; 325 TABLET ORAL at 12:47

## 2023-06-06 RX ADMIN — ANTACID TABLETS 1 TABLET: 500 TABLET, CHEWABLE ORAL at 14:38

## 2023-06-06 RX ADMIN — GLYCOPYRROLATE 0.1 MG: 0.2 INJECTION INTRAMUSCULAR; INTRAVENOUS at 10:31

## 2023-06-06 RX ADMIN — HYDROMORPHONE HYDROCHLORIDE 0.25 MG: 1 INJECTION, SOLUTION INTRAMUSCULAR; INTRAVENOUS; SUBCUTANEOUS at 12:33

## 2023-06-06 RX ADMIN — FENTANYL CITRATE 25 MCG: 50 INJECTION, SOLUTION INTRAMUSCULAR; INTRAVENOUS at 11:27

## 2023-06-06 RX ADMIN — PROPOFOL 200 MG: 10 INJECTION, EMULSION INTRAVENOUS at 10:25

## 2023-06-06 RX ADMIN — GABAPENTIN 600 MG: 300 CAPSULE ORAL at 14:38

## 2023-06-06 RX ADMIN — ONDANSETRON 4 MG: 2 INJECTION INTRAMUSCULAR; INTRAVENOUS at 11:53

## 2023-06-06 RX ADMIN — CLINDAMYCIN IN 5 PERCENT DEXTROSE 900 MG: 18 INJECTION, SOLUTION INTRAVENOUS at 22:52

## 2023-06-06 RX ADMIN — MIDAZOLAM 1 MG: 1 INJECTION INTRAMUSCULAR; INTRAVENOUS at 09:37

## 2023-06-06 RX ADMIN — FENTANYL CITRATE 50 MCG: 50 INJECTION, SOLUTION INTRAMUSCULAR; INTRAVENOUS at 10:25

## 2023-06-06 RX ADMIN — FENTANYL CITRATE 50 MCG: 50 INJECTION, SOLUTION INTRAMUSCULAR; INTRAVENOUS at 09:36

## 2023-06-06 RX ADMIN — DEXAMETHASONE SODIUM PHOSPHATE 8 MG: 4 INJECTION, SOLUTION INTRAMUSCULAR; INTRAVENOUS at 10:31

## 2023-06-06 RX ADMIN — CEFAZOLIN 3 G: 10 INJECTION, POWDER, FOR SOLUTION INTRAVENOUS at 10:11

## 2023-06-06 RX ADMIN — SUGAMMADEX 200 MG: 100 INJECTION, SOLUTION INTRAVENOUS at 12:03

## 2023-06-06 RX ADMIN — HYDROMORPHONE HYDROCHLORIDE 0.5 MG: 1 INJECTION, SOLUTION INTRAMUSCULAR; INTRAVENOUS; SUBCUTANEOUS at 12:13

## 2023-06-06 RX ADMIN — TAMSULOSIN HYDROCHLORIDE 0.4 MG: 0.4 CAPSULE ORAL at 20:50

## 2023-06-06 RX ADMIN — Medication 10 ML: at 20:51

## 2023-06-06 RX ADMIN — CELECOXIB 200 MG: 200 CAPSULE ORAL at 08:30

## 2023-06-06 NOTE — DISCHARGE PLACEMENT REQUEST
"Zack Isabella SÁNCHEZ Jr. \"Nasim\" (67 y.o. Male)       Date of Birth   1955    Social Security Number       Address   [address] [address]    Home Phone   [phone]    MRN   [id number]       [facility]    Marital Status                               Admission Date   6/6/23    Admission Type   Elective    Admitting Provider   Bonifacio Dwyer MD    Attending Provider   Bonifacio Dwyer MD    Department, Room/Bed   [facility], [facility] OR       Discharge Date       Discharge Disposition       Discharge Destination                                 Attending Provider: Bonifacio Dwyer MD    Allergies: No Known Allergies    Isolation: None   Infection: None   Code Status: Prior    Ht: 167.6 cm (66\")   Wt: 130 kg (286 lb 14.4 oz)    Admission Cmt: None   Principal Problem: Primary osteoarthritis of right knee [M17.11]                   Active Insurance as of 6/6/2023       Primary Coverage       Payor Plan Insurance Group Employer/Plan Group    HUMANA MEDICARE REPLACEMENT HUMANA MEDICARE REPLACEMENT [id number]       Payor Plan Address Payor Plan Phone Number Payor Plan Fax Number Effective Dates    [address] [address] [phone]  6/1/2021 - None Entered    [facility] [address]         Subscriber Name Subscriber Birth Date Member ID       ISABELLA MOCK PRINCESS ARREGUIN 1955 [id number]                     Emergency Contacts        (Rel.) Home Phone Work Phone Mobile Phone    Johnny Mock (Son) -- -- [phone]    PAOLA WOLF (Sister) [phone] -- --    KATIE LEARY (Sister) -- -- [phone]                "

## 2023-06-06 NOTE — OP NOTE
Orthopaedic Surgery Operative Note    Patient Name:  Armando Dixon Jr.  YOB: 1955  Age: 67 y.o.  Medical Records Number:  [id number]    Date of Procedure:  6/6/2023    Pre-operative Diagnosis:  Primary Osteoarthritis Right Knee    Post-operative Diagnosis:  Primary Osteoarthritis Right Knee    Procedure Performed:  Right Total Knee Arthroplasty    Surgical Approach: Knee Mid-Vastus    Implants:  Biomet Vanguard TKA, 65 Femoral Component, 75 Tibial Tray with a 40 mm Modular Stem, 31 3-Peg Patella, 12 mm Posterior Lipped Polyethylene Insert    Surgeon:  Bonifacio Dwyer M.D.    Assistant: Ledy Soares (who was present during the critical portions of the case, thereby decreasing operative time and patient morbidity)    Anesthetic Type:  General    Estimated Blood Loss:  250cc's    Specimens: * No orders in the log *    No Complications    Indications for Procedure:  Armando Dixon Jr. is a 67 y.o. male suffering from end stage degenerative changes in the right knee.  The patients pain is becoming disabling, despite extensive conservative care, including NSAIDS, therapy and injections.  The risks, benefits and alternatives were discussed and the patient wishes to proceed with right total knee arthroplasty.    Procedure Performed:    After informed consent was obtained, the correct patient identified, the correct operative side marked by the operative surgeon and pre-operative IV Kefzol given (prior to tourniquet inflation), the patient was taken to the operating room and placed supine on the operating table.  After general anesthesia was induced, a surgical time out was performed and 1 gm of Tranxemic Acid given, a well padded tourniquet was placed and the patient's right lower extremity was prepped with ChloraPrep and draped in a sterile fashion.    A midline incision was made overlying the right knee and we sharply dissected down to expose the parapatellar retinaculum.  A muscle sparing,  mid-vastus parapatellar arthrotomy was performed and we elevated the soft tissue both medially and laterally.  The menisci and ACL were excised.  We everted the patella and measured this to be 24 mm and we removed 9 mm of bone down to 15 mm.  We measured the patella to be 31 and drilled our three drill holes.  We protected the patella with the patella protector and turned our attention to the femur and the tibia.    We drilled drill holes into the femoral and the tibial intramedullary canals and proceeded to irrigate the canals to remove the fatty marrow.  We used the intramedullary margaux and the 5 degree valgus cut block to make our distal femoral cut.  Once we had a smooth surface, we measured the femur to be 65.  We assured we had rotational alignment using the epicondylar axis, then we used the four-in-one cut block to make our anterior, posterior, anterior and posterior chamfer cuts.  We placed our femoral trial, had excellent fit, and extended the knee and marked [place]'s line on the tibia.      We then turned our attention to the tibia, where we irrigated the canal again.  We used the intramedullary margaux and the 3 degree posterior sloped cut block to remove 2 mm of bone from the effected side of the tibia.  Prior to making our cut, we assured we had rotational alignment using the external guide and Anali's line.  Once we had a smooth surface, we measured the tibia to be 75.  We then removed soft tissue and bony debris from the posterior aspect of the knee.    We placed our trial implants and had excellent fit, range of motion, stability and ligament balance, throughout a complete arc of motion with a 12 lipped polyethylene liner.  We removed our trial implants, punched the tibial keel, copiously irrigated the knee, then cemented our implants in place using Biomet cement.  Once the cement cured, we trialed again with the 10, 12 and 14 AS,standard and posterior lipped polyethylene liners.  The 12 lipped  "gave us the best range of motion, stability and ligament balance, throughout a complete arc of motion.  We removed the trials, copiously irrigated the knee with dilute betadine solution, gave IV antibiotics and local anesthetic, then placed our permanent polyethylene liner.  We placed a 1/8\" hemovac drain, then closed the arthrotomy with #1 Vicryl pop-off sutures.  The subcutaneous tissue was closed with 2-0 vicryl pop-off sutures.  The skin was closed with staples.  We placed a sterile dressing of Xeroform, 4x4's, abdominal pads, cast padding and an Ace Wrap.  All sponge and needle counts were correct.  The patient was then awakened from general anesthesia and taken to the recovery room in stable condition.    The patient will be started on Aspirin 325 mg twice daily for DVT prophylaxis.  IV antibiotics will be discontinued within 24 hours of surgery.  Immediately prior to surgery, there were no acute Thromboembolic nor Cardiovascular risk factors.  An updated Medical Reconciliation form is on the chart.    Bonifacio Slater PA-C  [facility]  [address] [place], [place] [id number] [phone]    6/6/2023  "

## 2023-06-06 NOTE — PLAN OF CARE
Goal Outcome Evaluation:  Plan of Care Reviewed With: patient        Progress: improving  Outcome Evaluation: Pt admitted today after a right total knee. Walked from stretcher to BR to bed. Got up to chair. only c/o of mild pain treat with PRN meds. Good UOP. VSS. AOx4. Plans for dc tomorrow.

## 2023-06-06 NOTE — ANESTHESIA PROCEDURE NOTES
Peripheral Block    Pre-sedation assessment completed: 6/6/2023 9:37 AM    Patient reassessed immediately prior to procedure    Patient location during procedure: pre-op  Start time: 6/6/2023 9:38 AM  Stop time: 6/6/2023 9:40 AM  Reason for block: at surgeon's request and post-op pain management  Performed by  Anesthesiologist: Jeff Jay MD  Preanesthetic Checklist  Completed: patient identified, IV checked, site marked, risks and benefits discussed, surgical consent, monitors and equipment checked, pre-op evaluation and timeout performed  Prep:  Pt Position: supine  Sterile barriers:mask, gloves and cap  Prep: ChloraPrep  Patient monitoring: blood pressure monitoring, continuous pulse oximetry and EKG  Procedure    Sedation: yes  Performed under: local infiltration  Guidance:ultrasound guided    ULTRASOUND INTERPRETATION.  Using ultrasound guidance a 21 G gauge needle was placed in close proximity to the nerve, at which point, under ultrasound guidance anesthetic was injected in the area of the nerve and spread of the anesthesia was seen on ultrasound in close proximity thereto.  There were no abnormalities seen on ultrasound; a digital image was taken; and the patient tolerated the procedure with no complications. Images:still images obtained, printed/placed on chart    Laterality:right  Block Type:adductor canal block  Injection Technique:single-shot  Needle Type:echogenic and Tuohy  Needle Gauge:21 G  Resistance on Injection: none    Medications Used: ropivacaine (NAROPIN) 0.5 % injection - Injection   14 mL - 6/6/2023 9:38:00 AM  dexamethasone (DECADRON) injection - Injection   4 mg - 6/6/2023 9:38:00 AM      Post Assessment  Injection Assessment: negative aspiration for heme, no paresthesia on injection and incremental injection  Patient Tolerance:comfortable throughout block  Complications:no  Additional Notes  Ultrasound guidance used to visualize nerve anatomy, guide needle placement and verify  local anesthetic disbursement.

## 2023-06-06 NOTE — DISCHARGE SUMMARY
Orthopaedic Surgery Discharge Summary    Patient Name:  Armando Dixon Jr.  YOB: 1955  Age: 67 y.o.  Medical Records Number:  [id number]    Date of Admission:  6/6/2023  Date of Discharge:  6/7/2023    Primary Discharge Diagnosis:  Primary osteoarthritis of right knee [M17.11]    Secondary Discharge Diagnosis:    Problems Addressed this Visit          Musculoskeletal and Injuries    * (Principal) Primary osteoarthritis of right knee - Primary    Relevant Medications    oxyCODONE-acetaminophen (Percocet)  MG per tablet     Diagnoses         Codes Comments    Primary osteoarthritis of right knee    -  Primary ICD-10-CM: M17.11  ICD-9-CM: 715.16             Procedures Performed:  Right Total Knee Arthroplasty      Hospital Course:    Armando Dixon Jr. is a 67 y.o.  male who underwent successful right tka on 6/6/2023.  Armando Dixon Jr. was started on Aspirin 325 mg po twice daily post-operatively for DVT prophylaxis.  On post-op day 1 the patients dressing was changed and their incision was clean, with no signs of infection and their calf was soft, with no signs of DVT.  The patient progressed well with physical therapy and the patients hemoglobin remained stable. On post-operative day 1 the patient was felt ready for discharge.     Vitals:  Vitals:    06/06/23 1245 06/06/23 1300 06/06/23 1305 06/06/23 1339   BP: 128/58 128/60  146/81   BP Location:  Right arm  Right arm   Patient Position:  Lying  Sitting   Pulse: 65 68 71 82   Resp: 18 16 16 16   Temp:   97.5 °F (36.4 °C) 98 °F (36.7 °C)   TempSrc:   Oral Oral   SpO2: 99% 97% 98% 96%       Discharge Medications:      Discharge Medications        New Medications        Instructions Start Date   aspirin 325 MG EC tablet  Replaces: aspirin 81 MG chewable tablet   325 mg, Oral, 2 Times Daily With Meals   Start Date: June 7, 2023     docusate sodium 250 MG capsule  Commonly known as: COLACE   250 mg, Oral, 2 Times Daily PRN       oxyCODONE-acetaminophen  MG per tablet  Commonly known as: Percocet   1-2 tablets, Oral, Every 4 Hours PRN             Continue These Medications        Instructions Start Date   acetaminophen 500 MG tablet  Commonly known as: TYLENOL   500 mg, Oral, Every 6 Hours PRN      ALBUTEROL IN   Inhalation, As Needed      alfuzosin 10 MG 24 hr tablet  Commonly known as: UROXATRAL   10 mg, Oral, Daily      atorvastatin 20 MG tablet  Commonly known as: LIPITOR   TAKE ONE TABLET BY MOUTH DAILY      calcium carbonate 500 MG chewable tablet  Commonly known as: TUMS   1 tablet, Oral, Daily      cyclobenzaprine 10 MG tablet  Commonly known as: FLEXERIL   10 mg, Oral, 2 Times Daily PRN      doxepin 25 MG capsule  Commonly known as: SINEquan   50 mg, Nightly PRN      DULoxetine HCl 30 MG capsule  Commonly known as: DRIZALMA   Oral, Daily      gabapentin 300 MG capsule  Commonly known as: NEURONTIN   900 mg, Every Morning,  CAPSULES AT BEDTIME      lamoTRIgine 150 MG tablet  Commonly known as: LaMICtal   200 mg, Oral, Nightly      lisdexamfetamine 40 MG capsule  Commonly known as: VYVANSE   40 mg, Oral, Every Morning, HOLD 48 HOURS PRIOR TO SURGERY      metoprolol tartrate 25 MG tablet  Commonly known as: LOPRESSOR   TAKE 1/2 TABLET BY MOUTH TWICE A DAY      montelukast 10 MG tablet  Commonly known as: SINGULAIR   10 mg, Oral, Nightly      nitroglycerin 0.4 MG SL tablet  Commonly known as: NITROSTAT   1 under the tongue as needed for angina, may repeat q5mins for up three doses      omeprazole 40 MG capsule  Commonly known as: priLOSEC   40 mg, Oral, 2 Times Daily      triamterene-hydrochlorothiazide 37.5-25 MG per capsule  Commonly known as: DYAZIDE   1 capsule, Oral, Every Morning             Stop These Medications      aspirin 81 MG chewable tablet  Replaced by: aspirin 325 MG EC tablet     Chlorhexidine Gluconate Cloth 2 % pads     diclofenac 50 MG tablet  Commonly known as: CATAFLAM     HYDROcodone-acetaminophen   MG per tablet  Commonly known as: NORCO              Pain Medications:  Percocet 10/325 mg 1-2 po q 4-6 hours prn pain    DVT Prophylaxis:  Enteric Coated Aspirin 325 mg po twice daily for 2 weeks, then one daily for 4 weeks      Total Knee Joint Replacement Discharge Instructions:    I. ACTIVITIES:  1. Exercises:  Complete exercise program as taught by the hospital physical therapist 2 times per day  Exercise program will be advanced by the physical therapist  During the day be up ambulating every 2 hours (while awake) for short distances  Complete the ankle pump exercises at least 10 times per hour (while awake)  Elevate legs most of the day the first week post operatively and thereafter elevate legs when in bed and for at least 30 minutes during the day. Caution must be taken to avoid pillow placement under the bend of the knee as this can led to flexion contractures of the knee.  Use cold packs 20-30 minutes approximately 5 times per day. This should be done before and after completing your exercises and at any time you are experiencing pain/ stiffness in your operative extremity.      2. Activities of Daily Living:  No tub baths, hot tubs, or swimming pools for 4 weeks  May shower and let water run over the incision on post-operative day #5 if no drainage. Do not scrub or rub the incision. Simply let the water run over the incision and pat dry.    II. Restrictions  Do not cross legs or kneel  Your surgeon will discuss with you when you will be able to drive again.  Weight bearing is as tolerated  First week stay inside on even terrain. May go up and down stairs one stair at a time utilizing the hand rail  After one week, you may venture outside.    III. Precautions:  Everyone that comes near you should wash their hands  No elective dental, genital-urinary, or colon procedures or surgical procedures for 12 weeks after surgery unless absolutely necessary.   If dental work or surgical procedure is deemed  absolutely necessary, you will need to contact your surgeon as you will need to take antibiotics 1 hour prior to any dental work (including teeth cleanings).  Please discuss with your surgeon prophylactic antibiotics as the length of time this intervention will be necessary for you varies with each patient’s health history and situation.  Avoid sick people. If you must be around someone who is ill, they should wear a mask.  Avoid visits to the Emergency Room or Urgent Care unless you are having a life threatening event.   If ordered stockings are to be placed on in the morning and removed at night. Monitor the stockings to ensure that any swelling is not causing the stockings to become too tight. In this case, remove stockings immediately.    IV. INCISION CARE:  Wash your hands prior to dressing changes  Change the dressing as needed to keep incision clean and dry. Utilize dry gauze and paper tape. Avoid touching the side of the gauze that goes against the incision with your hands.  No creams or ointments to the incision  May remove dressing once the incision is free of drainage  Do not touch or pick at the incision  Check incision every day and notify surgeon immediately if any of the following signs or symptoms are noted:  Increase in redness  Increase in swelling around the incision and of the entire extremity  Increase in pain  Drainage oozing from the incision  Pulling apart of the edges of the incision  Increase in overall body temperature (greater than 100.5 degrees)  Your surgeon will instruct you regarding suture or staple removal    V. Medications:   1. Anticoagulants: You will be discharged on an anticoagulant. This is a prophylactic medication that helps prevent blood clots during your post-operative period. The type and length of dosage varies based on your individual needs, procedure performed, and surgeon’s preference.  While taking the anticoagulant, you should avoid taking any additional aspirin,  ibuprofen (Advil or Motrin), Aleve (Naprosyn) or other non-steroidal anti-inflammatory medications.   Notify surgeon immediately if any marleni bleeding is noted in the urine, stool, emesis, or from the nose or the incision. Blood in the stool will often appear as black rather than red. Blood in urine may appear as pink. Blood in emesis may appear as brown/black like coffee grounds.  You will need to apply pressure for longer periods of time to any cuts or abrasions to stop bleeding  Avoid alcohol while taking anticoagulants    2. Stool Softeners: You will be at greater risk of constipation after surgery due to being less mobile and the pain medications.   Take stool softeners as instructed by your surgeon while on pain medications. Over the counter Colace 100 mg 1-2 capsules twice daily.   If stools become too loose or too frequent, please decreases the dosage or stop the stool softener.  If constipation occurs despite use of stool softeners, you are to continue the stool softeners and add a laxative (Milk of Magnesia 1 ounce daily as needed)  Drink plenty of fluids, and eat fruits and vegetables during your recovery time    3. Pain Medications utilized after surgery are narcotics and the law requires that the following information be given to all patients that are prescribed narcotics:  CLASSIFICATION: Pain medications are called Opioids and are narcotics  LEGALITIES: It is illegal to share narcotics with others and to drive within 24 hours of taking narcotics  POTENTIAL SIDE EFFECTS: Potential side effects of opioids include: nausea, vomiting, itching, dizziness, drowsiness, dry mouth, constipation, and difficulty urinating.  POTENTIAL ADVERSE EFFECTS:   Opioid tolerance can develop with use of pain medications and this simply means that it requires more and more of the medication to control pain; however, this is seen more in patients that use opioids for longer periods of time.  Opioid dependence can develop with  use of Opioids and this simply means that to stop the medication can cause withdrawal symptoms; however, this is seen with patients that use Opioids for longer periods of time.  Opioid addiction can develop with use of Opioids and the incidence of this is very unlikely in patients who take the medications as ordered and stop the medications as instructed.  Opioid overdose can be dangerous, but is unlikely when the medication is taken as ordered and stopped when ordered. It is important not to mix opioids with alcohol or with and type of sedative such as Benadryl as this can lead to over sedation and respiratory difficulty.  DOSAGE:   Pain medications will need to be taken consistently for the first week to decrease pain and promote adequate pain relief and participation in physical therapy.  After the initial surgical pain begins to resolve, you may begin to decrease the pain medication. By the end of 6-8 weeks, you should be off of pain medications.  Refills will not be given by the office during evening hours, on weekends, or after 6-8 weeks post-op.  To seek refills on pain medications during the initial 6 week post-operative period, you must call the office 48 hours in advance to request the refill. The office will then notify you when to  the prescription. DO NOT wait until you are out of the medication to request a refill.    V. FOLLOW-UP VISITS:  You will need to follow up in the office with your surgeon in 3 weeks. Please call this number [phone] to schedule this appointment.  If you have any concerns or suspected complications prior to your follow up visit, please call your surgeons office. Do not wait until your appointment time if you suspect complications. These will need to be addressed in the office promptly.    Bonifacio Slater PA-C  [facility]  [address] [place] [address]  [phone]    6/6/2023    CC:Bonifacio Dodd MD:Bonifacio CERVANTES  MD Reymundo

## 2023-06-06 NOTE — ANESTHESIA PREPROCEDURE EVALUATION
Anesthesia Evaluation     Patient summary reviewed and Nursing notes reviewed   no history of anesthetic complications:   NPO Solid Status: > 8 hours  NPO Liquid Status: > 2 hours           Airway   Mallampati: II  TM distance: >3 FB  Neck ROM: full  Dental      Pulmonary    (+) pulmonary embolism, asthma,sleep apnea  Cardiovascular     ECG reviewed    (+) hypertension, CAD, cardiac stents DVT, hyperlipidemia      Neuro/Psych  GI/Hepatic/Renal/Endo    (+) morbid obesity, GERD    Musculoskeletal     Abdominal    Substance History      OB/GYN          Other                        Anesthesia Plan    ASA 3     general     intravenous induction     Anesthetic plan, risks, benefits, and alternatives have been provided, discussed and informed consent has been obtained with: patient.    CODE STATUS:

## 2023-06-06 NOTE — PLAN OF CARE
Goal Outcome Evaluation:      Patient is a pleasant 67 y.o. male POD0 R TKA with expected post op weakness and impaired functional mobility. Patient is independent with a cane at baseline and lives at home with son and grandchildren- access to RW. Today, patient performed bed mobility with SBA, required CGA-Saurav for transfers, and ambulated 40' CGA total with a RW. Increased time needed to complete all tasks and at length time spent discussing home environment as patient nervious about his bathroom situation. Explained to this PT that a RW does not fit within bathroom or doorway. Patient did mention he has access to a BSC at home but does not have the bucket that enters inside- discussed with patient recommendation to order bucket to ensure home safety as patient is very nervous about this. Patient will benefit from skilled PT services acutely to address functional deficits as well as improve level of independence prior to discharge. Anticipate home with  PT upon DC.

## 2023-06-06 NOTE — ANESTHESIA POSTPROCEDURE EVALUATION
Patient: Armando Dixon Jr.    Procedure Summary       Date: 06/06/23 Room / Location:  [facility] [address] [place] [address] [facility]    Anesthesia Start: 1015 Anesthesia Stop: 1226    Procedure: TOTAL KNEE ARTHROPLASTY (Right: Knee) Diagnosis:     Surgeons: Bonifacio Dwyer MD Provider: Jeff Jay MD    Anesthesia Type: general ASA Status: 3            Anesthesia Type: general    Vitals  Vitals Value Taken Time   /60 06/06/23 1300   Temp 36.4 °C (97.5 °F) 06/06/23 1305   Pulse 74 06/06/23 1308   Resp 16 06/06/23 1305   SpO2 96 % 06/06/23 1308   Vitals shown include unvalidated device data.        Post Anesthesia Care and Evaluation    Patient location during evaluation: bedside  Patient participation: complete - patient participated  Level of consciousness: awake and alert  Pain management: adequate    Airway patency: patent  Anesthetic complications: No anesthetic complications  PONV Status: controlled  Cardiovascular status: acceptable  Respiratory status: acceptable  Hydration status: acceptable

## 2023-06-06 NOTE — H&P
Orthopaedic Surgery History and Physical    Patient Name:  Armando Dixon Jr.  YOB: 1955  Age: 67 y.o.  Medical Records Number:  [id number]    Date of Admission:  6/6/2023  7:35 AM    Chief Complaint:  No admission diagnoses are documented for this encounter.    Armando Dixon Jr. is a 67 y.o. male who presents c/o severe right knee pain.  The pain has been on and off for many years, worsening to the point where the pain is becoming disabling.  The pain is a constant dull ache with occasional sharp, stabbing pain.  The patient has failed conservative treatment and would like to proceed with right total knee arthroplasty.    /62 (BP Location: Right arm, Patient Position: Lying)   Pulse 60   Temp 98.2 °F (36.8 °C) (Oral)   Resp 16   SpO2 98%     Past Medical History:    Past Medical History:   Diagnosis Date    Allergic     Anxiety     Arthritis     Asthma     CHILDOOD    Coronary artery disease     STENT X 2    Cut of lower leg 05/26/2023    MULTIPLE-RIGHT-TO NOTIFY DR. RAJPUT    DDD (degenerative disc disease), cervical     LUMBAR, THORACIC, SACRAL    Deep vein thrombosis     Depression     Esophagitis with gastritis     GERD (gastroesophageal reflux disease)     History of kidney stones     Hypertension     Incisional hernia     Numbness     LEFT LATERAL THIGH S/P HIP ARTHROPLASTY/MEDIAL PAIN    ENRRIQUE on CPAP     Osteoarthritis of right knee     Osteoporosis     Prediabetes     Prostatitis     Pulmonary embolism     Under care of pain management specialist     Urgency incontinence        Past Surgical History:   Past Surgical History:   Procedure Laterality Date    CARDIAC CATHETERIZATION      CARDIAC CATHETERIZATION N/A 02/28/2020    Procedure: Left Heart Cath;  Surgeon: Minerva Adams MD;  Location: [facility] INVASIVE LOCATION;  Service: Cardiology;  Laterality: N/A;    CARDIAC CATHETERIZATION N/A 02/28/2020    Procedure: Coronary angiography;  Surgeon: Bryan  MD Minerva;  Location:  [facility];  Service: Cardiology;  Laterality: N/A;    CARDIAC CATHETERIZATION N/A 02/28/2020    Procedure: Left ventriculography;  Surgeon: Minerva Adams MD;  Location:  [facility];  Service: Cardiology;  Laterality: N/A;    CARDIAC CATHETERIZATION N/A 02/28/2020    Procedure: Percutaneous Coronary Intervention;  Surgeon: Minerva Adams MD;  Location:  [facility];  Service: Cardiology;  Laterality: N/A;    CARDIAC CATHETERIZATION N/A 02/28/2020    Procedure: Stent FADY coronary;  Surgeon: Minerva Adams MD;  Location:  [facility];  Service: Cardiology;  Laterality: N/A;    CARPAL TUNNEL RELEASE Bilateral     CHOLECYSTECTOMY      COLONOSCOPY  10/06/2014    [facility]    ENDOSCOPY  08/29/2014    Severe esophagitis, gastritis, neg for H pylori, Walters's, and fungi    GASTRIC BYPASS      INCISIONAL HERNIA REPAIR      TOTAL HIP ARTHROPLASTY Bilateral     VENA CAVA FILTER INSERTION         Social History:    Social History     Socioeconomic History    Marital status:    Tobacco Use    Smoking status: Never    Smokeless tobacco: Never   Vaping Use    Vaping Use: Never used   Substance and Sexual Activity    Alcohol use: Yes     Comment: RARELY    Drug use: No    Sexual activity: Defer       Family History:    Family History   Problem Relation Age of Onset    Hypertension Mother     Arrhythmia Mother     Hyperlipidemia Neg Hx     Heart disease Neg Hx     Heart failure Neg Hx     Heart attack Neg Hx     Malig Hyperthermia Neg Hx        Current Medications:  Scheduled Meds:ceFAZolin, 3 g, Intravenous, Once  ethyl alcohol, 2 swab, Nasal, Once  Orthopedic surgery custom cocktail, , Injection, Once  sodium chloride, 3 mL, Intravenous, Q12H      Continuous Infusions:lactated ringers, 9 mL/hr, Last Rate: 9 mL/hr (06/06/23 0830)      PRN Meds:.  fentanyl    lidocaine PF 1%    midazolam    sodium  chloride    Current Facility-Administered Medications:     ceFAZolin in Sodium Chloride (ANCEF) IVPB solution 3 g, 3 g, Intravenous, Once, Bonifacio Dwyer MD    ethyl alcohol 62 % 2 each, 2 swab, Nasal, Once, Bonifacio Dwyer MD    fentaNYL citrate (PF) (SUBLIMAZE) injection 50 mcg, 50 mcg, Intravenous, Once PRN, Jeff Jay MD    lactated ringers infusion, 9 mL/hr, Intravenous, Continuous, Jeff Jay MD, Last Rate: 9 mL/hr at 06/06/23 0830, 9 mL/hr at 06/06/23 0830    lidocaine PF 1% (XYLOCAINE) injection 0.5 mL, 0.5 mL, Injection, Once PRN, Jeff Jay MD    midazolam (VERSED) injection 0.5 mg, 0.5 mg, Intravenous, Q5 Min PRN, Jeff Jay MD    ropivacaine 0.5 % 50 mL, Morphine 5 mg, ketorolac 30 mg, EPINEPHrine 1 mg/mL 0.3 mg in sodium chloride 0.9 % 50 mL solution, , Injection, Once, Bonifacio Dwyer MD    sodium chloride 0.9 % flush 3 mL, 3 mL, Intravenous, Q12H, Jeff Jay MD    sodium chloride 0.9 % flush 3-10 mL, 3-10 mL, Intravenous, PRN, Jeff Jay MD    Allergies:  No Known Allergies    Review of Systems:   HEENT: Patient denies any headaches, vision changes, change in hearing, or tinnitus, Patient denies any rhinorrhea,epistaxis, sinus pain, mouth or dental problems, sore throat or hoarseness, or dysphagia  Pulmonary: Patient denies any cough, congestion, SOA, or wheezing  Cardiovascular: Patient denies any chest pain, dyspnea, palpitations, weakness, intolerance of exercise, varicosities, swelling of extremities, known murmur  Gastrointestinal:  Patient denies nausea, vomiting, diarrhea, constipation, loss  of appetite, change in appetite, dysphagia, gas, heartburn, melena, change in bowel habits, use of laxatives or other drugs to alter the function of the gastrointestinal tract.  Genital/Urinary: Patient denies dysuria, change in color of urine, change in frequency of urination, pain with urgency, incontinence, retention, or  nocturia.  Musculoskeletal: Patient denies increased warmth; redness; or swelling of joints; limitation of function; deformity; crepitation: pain in a joint or an extremity, the neck, or the back, especially with movement.  Neurological: Patient denies dizziness, tremor, ataxia, difficulty in speaking, change in speech, paresthesia, loss of sensation, seizures, syncope, changes in memory.  Endocrine system: Patient denies tremors, palpitations, intolerance of heat or cold, polyuria, polydipsia, polyphagia, diaphoresis, exophthalmos, or goiter.  Psychological: Patient denies thoughts/plans or harming self or other; depression,  insomnia, night terrors, azul, memory loss, disorientation.  Skin: Patient denies any bruising, rashes, discoloration, pruritus, wounds, ulcers, decubiti, changes in the hair or nails  Hematopoietic: Patient denies history of spontaneous or excessive bleeding, epistaxis, hematuria, melena, fatigue, enlarged or tender lymph nodes, pallor, history of anemia.      Physical Exam:   Awake, A&O x3, affect normal, no acute distress  Ambulating with a limp due to knee pain  Knee ROM is limited due to pain (5-115)  Strength is 4/5 in the quad, hamstring and calf  Cap refill is normal, Sensation intact    Card:  RR, HD Stable  Pulm:  Regular breathing, no S.O.A  Abd:  Soft, NT, ND    Lab Results (last 24 hours)       Procedure Component Value Units Date/Time    SCANNED - LABS [[id number]] Resulted: 06/06/23     Updated: 06/05/23 1024            Adult Transthoracic Echo Complete W/ Cont if Necessary Per Protocol    Result Date: 5/25/2023  Narrative:   Left ventricular ejection fraction appears to be 66 - 70%.   Left ventricular diastolic function was normal.     XR Knee 1 or 2 View Right    Result Date: 5/26/2023  Narrative: PROCEDURE:  XR KNEE 1 OR 2 VW RIGHT-  HISTORY: Preop.  COMPARISON: None.  FINDINGS:   2 views of the right knee were obtained.  No acute fracture or malalignment is identified.   There is advanced tricompartmental osteoarthritis consisting of joint space narrowing, subchondral sclerosis and cyst formation, and small marginal osteophytes, most pronounced at the medial knee compartment, where it is advanced with bone-on-bone articulation.  There is a trace knee effusion.  This report was finalized on 5/26/2023 11:33 AM by Dr. Guadalupe Lima M.D.      Stress Test With Myocardial Perfusion One Day    Result Date: 5/25/2023  Narrative:   Findings consistent with a normal ECG stress test.   Left ventricular ejection fraction is normal (Calculated EF = 60%).   Myocardial perfusion imaging indicates a normal myocardial perfusion study with no evidence of ischemia.   Impressions are consistent with a low risk study. Asymptomatic for chest pain. ECG is negative for ischemia. Ectopy: none B/P: Appropriate for Beta-blocker therapy Baseline 117/53, Peak (post Lexiscan) 98/40, Recovery:109//38 Pharmacologic study due to inability to tolerate increasing speed and grade of treadmill due to orthopedic, mobility  issues and Beta-blocker therapy.  Unable to participate in low level exercise due to unsteady gait, poor balance and poor mobility. Uses cane for ambulation. Supervised by: Dr. Adams     XR Chest PA & Lateral    Result Date: 5/26/2023  Narrative: XR CHEST PA AND LATERAL-  HISTORY:  Preop.  COMPARISON:  Chest radiograph 01/25/2020  FINDINGS:  2 views of the chest were obtained.  Support Devices:  None. Cardiac Silhouette/Mediastinum/Roseanne:  The cardiac, mediastinal, and hilar contours are not significantly changed. There is a coronary artery stent, which is new. Lungs/Pleural Spaces:  There is no focal consolidation. Pleural spaces are clear. Chest Wall/Diaphragm/Upper Abdomen:  There is multilevel degenerative disc disease.   CONCLUSION(S):   1.  No focal consolidation or effusion.  This report was finalized on 5/26/2023 11:36 AM by Dr. Guadalupe Lima M.D.       Assessment:  End-stage  Primary Right Knee Osteoarthritis    Plan:  Patient's pain is becoming disabling, despite extensive conservative treatment.  Radiographs reveal end-stage degenerative changes.  The risks of surgery, including, but not limited to, heart attack, stroke, dying, DVT, nerve injury, vascular injury, arthrofibrosis and infection were discussed.  The alternatives and benefits were also discussed.  All questions answered and the patient wishes to proceed with right total knee arthroplasty.    Bonifacio Slater PA-C  [facility]  [address] [place], [place] [address] [phone]    6/6/2023    CC: Bonifacio Dodd MD, Bonifacio Dwyer MD

## 2023-06-06 NOTE — ADDENDUM NOTE
Addendum  created 06/06/23 1331 by Jeff Jay MD    Attestation recorded in Intraprocedure, Intraprocedure Attestations filed

## 2023-06-06 NOTE — ANESTHESIA PROCEDURE NOTES
Airway  Urgency: elective    Date/Time: 6/6/2023 10:29 AM  Airway not difficult    General Information and Staff    Patient location during procedure: OR  Anesthesiologist: Jeff Jay MD  CRNA/CAA: Lorin Rosado CRNA    Indications and Patient Condition  Indications for airway management: airway protection    Preoxygenated: yes  MILS not maintained throughout  Mask difficulty assessment: 2 - vent by mask + OA or adjuvant +/- NMBA    Final Airway Details  Final airway type: endotracheal airway      Successful airway: ETT  Cuffed: yes   Successful intubation technique: direct laryngoscopy  Facilitating devices/methods: intubating stylet and cricoid pressure  Endotracheal tube insertion site: oral  Blade: Kendy  Blade size: 4  ETT size (mm): 7.5  Cormack-Lehane Classification: grade I - full view of glottis  Placement verified by: chest auscultation and capnometry   Cuff volume (mL): 8  Measured from: lips  ETT/EBT  to lips (cm): 22  Number of attempts at approach: 1  Assessment: lips, teeth, and gum same as pre-op and atraumatic intubation    Additional Comments  Airway exam prior to DL, teeth/lips inspected. Preoxygenated with 100% O2; sniffing position, easy mask ventilation. Eyes taped. Atraumatic intubation. Lips and teeth intact, no damage. ETT connected to vent. Confirmed EBBS, +EtCO2.

## 2023-06-06 NOTE — THERAPY EVALUATION
Patient Name: Armando Dixon Jr.  : 1955    MRN: [id number]                              Today's Date: 2023       Admit Date: 2023    Visit Dx: No diagnosis found.  Patient Active Problem List   Diagnosis    Essential hypertension    Class 1 obesity due to excess calories without serious comorbidity with body mass index (BMI) of 30.0 to 30.9 in adult    Mixed hyperlipidemia    Abnormal cardiac function test    Coronary artery disease involving native coronary artery of native heart without angina pectoris    Abnormal electrocardiogram    Preoperative examination, unspecified    Primary osteoarthritis of right knee     Past Medical History:   Diagnosis Date    Allergic     Anxiety     Arthritis     Asthma     CHILDOOD    Coronary artery disease     STENT X 2    Cut of lower leg 2023    MULTIPLE-RIGHT-TO NOTIFY DR. RAJPUT    DDD (degenerative disc disease), cervical     LUMBAR, THORACIC, SACRAL    Deep vein thrombosis     Depression     Esophagitis with gastritis     GERD (gastroesophageal reflux disease)     History of kidney stones     Hypertension     Incisional hernia     Numbness     LEFT LATERAL THIGH S/P HIP ARTHROPLASTY/MEDIAL PAIN    ENRRIQUE on CPAP     Osteoarthritis of right knee     Osteoporosis     Prediabetes     Prostatitis     Pulmonary embolism     Under care of pain management specialist     Urgency incontinence      Past Surgical History:   Procedure Laterality Date    CARDIAC CATHETERIZATION      CARDIAC CATHETERIZATION N/A 2020    Procedure: Left Heart Cath;  Surgeon: Minerva Adams MD;  Location:  [facility];  Service: Cardiology;  Laterality: N/A;    CARDIAC CATHETERIZATION N/A 2020    Procedure: Coronary angiography;  Surgeon: Minerva Adams MD;  Location:  [facility];  Service: Cardiology;  Laterality: N/A;    CARDIAC CATHETERIZATION N/A 2020    Procedure: Left ventriculography;  Surgeon: Bryan  MD Minerva;  Location:  [facility];  Service: Cardiology;  Laterality: N/A;    CARDIAC CATHETERIZATION N/A 02/28/2020    Procedure: Percutaneous Coronary Intervention;  Surgeon: Minerva Adams MD;  Location:  [facility];  Service: Cardiology;  Laterality: N/A;    CARDIAC CATHETERIZATION N/A 02/28/2020    Procedure: Stent FADY coronary;  Surgeon: Minerva Adams MD;  Location:  [facility];  Service: Cardiology;  Laterality: N/A;    CARPAL TUNNEL RELEASE Bilateral     CHOLECYSTECTOMY      COLONOSCOPY  10/06/2014    [facility]    ENDOSCOPY  08/29/2014    Severe esophagitis, gastritis, neg for H pylori, Walters's, and fungi    GASTRIC BYPASS      INCISIONAL HERNIA REPAIR      TOTAL HIP ARTHROPLASTY Bilateral     VENA CAVA FILTER INSERTION        General Information       Row Name 06/06/23 1516          Physical Therapy Time and Intention    Document Type evaluation  -MS     Mode of Treatment physical therapy  -MS       Row Name 06/06/23 1516          General Information    Patient Profile Reviewed yes  -MS     Prior Level of Function independent:;all household mobility  use of cane, access to standard and RW  -MS     Existing Precautions/Restrictions fall  -MS     Barriers to Rehab medically complex  -MS       Row Name 06/06/23 1516          Living Environment    People in Home child(veronica), adult  -MS       Row Name 06/06/23 1516          Home Main Entrance    Number of Stairs, Main Entrance none  -MS       Row Name 06/06/23 1516          Stairs Within Home, Primary    Number of Stairs, Within Home, Primary none  -MS       Row Name 06/06/23 1516          Cognition    Orientation Status (Cognition) oriented x 4  -MS       Row Name 06/06/23 1516          Safety Issues, Functional Mobility    Safety Issues Affecting Function (Mobility) judgment;positioning of assistive device;sequencing abilities  -MS     Impairments Affecting Function (Mobility)  pain;postural/trunk control;range of motion (ROM);strength;endurance/activity tolerance;balance  -MS     Comment, Safety Issues/Impairments (Mobility) Gait belt and non skid socks donned.  -MS               User Key  (r) = Recorded By, (t) = Taken By, (c) = Cosigned By      Initials Name Provider Type    MS LorenzsGuadalupe, PT Physical Therapist                   Mobility       Row Name 06/06/23 1517          Bed Mobility    Bed Mobility supine-sit;sit-supine  -MS     Supine-Sit [place] (Bed Mobility) standby assist;verbal cues  -MS       Row Name 06/06/23 1517          Sit-Stand Transfer    Sit-Stand [place] (Transfers) contact guard;minimum assist (75% patient effort);verbal cues  -MS       Row Name 06/06/23 1517          Gait/Stairs (Locomotion)    [place] Level (Gait) contact guard;verbal cues  -MS     Assistive Device (Gait) walker, front-wheeled  -MS     Ambulated day of surgery or within 4 hours of PACU discharge yes  -MS     Distance in Feet (Gait) 40'  -MS     Deviations/Abnormal Patterns (Gait) dillon decreased;gait speed decreased;antalgic  -MS     Comment, (Gait/Stairs) Walker placement and sequencing cues.  -MS       Row Name 06/06/23 1517          Mobility    Extremity Weight-bearing Status right lower extremity  -MS     Right Lower Extremity (Weight-bearing Status) weight-bearing as tolerated (WBAT)  -MS               User Key  (r) = Recorded By, (t) = Taken By, (c) = Cosigned By      Initials Name Provider Type    MS LorenzsGuadalupe, PT Physical Therapist                   Obj/Interventions       Row Name 06/06/23 1518          Range of Motion Comprehensive    Comment, General Range of Motion R LE limited 2/2 pain  -MS       Row Name 06/06/23 1518          Strength Comprehensive (MMT)    Comment, General Manual Muscle Testing (MMT) Assessment R LE post op weakness  -MS       Row Name 06/06/23 1518          Motor Skills    Therapeutic Exercise --  R TKA protocol x 10 reps  -MS        Row Name 06/06/23 1518          Balance    Balance Assessment sitting static balance;standing static balance  -MS     Static Sitting Balance supervision  -MS     Position, Sitting Balance sitting edge of bed  -MS     Static Standing Balance standby assist  -MS     Position/Device Used, Standing Balance supported;walker, rolling  -MS       Row Name 06/06/23 1518          Sensory Assessment (Somatosensory)    Sensory Assessment (Somatosensory) sensation intact  -MS               User Key  (r) = Recorded By, (t) = Taken By, (c) = Cosigned By      Initials Name Provider Type    MS Guadalupe Zavaleta, PT Physical Therapist                   Goals/Plan       Row Name 06/06/23 1530          Bed Mobility Goal 1 (PT)    Activity/Assistive Device (Bed Mobility Goal 1, PT) bed mobility activities, all  -MS     [place] Level/Cues Needed (Bed Mobility Goal 1, PT) supervision required  -MS     Time Frame (Bed Mobility Goal 1, PT) 1 week  -MS       Row Name 06/06/23 1530          Transfer Goal 1 (PT)    Activity/Assistive Device (Transfer Goal 1, PT) transfers, all;walker, rolling  -MS     [place] Level/Cues Needed (Transfer Goal 1, PT) supervision required  -MS     Time Frame (Transfer Goal 1, PT) 1 week  -MS       Row Name 06/06/23 1530          Gait Training Goal 1 (PT)    Activity/Assistive Device (Gait Training Goal 1, PT) gait (walking locomotion);walker, rolling  -MS     [place] Level (Gait Training Goal 1, PT) supervision required  -MS     Distance (Gait Training Goal 1, PT) 100'  -MS     Time Frame (Gait Training Goal 1, PT) 1 week  -MS       Row Name 06/06/23 1530          Therapy Assessment/Plan (PT)    Planned Therapy Interventions (PT) balance training;bed mobility training;gait training;home exercise program;patient/family education;postural re-education;ROM (range of motion);stair training;strengthening;transfer training  -MS               User Key  (r) = Recorded By, (t) = Taken By, (c) =  Cosigned By      Initials Name Provider Type    MS ZavaletaGuadalupe, PT Physical Therapist                   Clinical Impression       Row Name 06/06/23 1529          Pain    Pretreatment Pain Rating 7/10  -MS     Posttreatment Pain Rating 7/10  -MS     Pain Location - Side/Orientation Right  -MS     Pain Location incisional  -MS     Pain Location - knee  -MS     Pain Intervention(s) Repositioned;Ambulation/increased activity;Rest  -MS       Row Name 06/06/23 1529          Therapy Assessment/Plan (PT)    Rehab Potential (PT) good, to achieve stated therapy goals  -MS     Criteria for Skilled Interventions Met (PT) yes;meets criteria  -MS     Therapy Frequency (PT) daily  -MS       Row Name 06/06/23 1529          Vital Signs    O2 Delivery Pre Treatment room air  -MS       Row Name 06/06/23 1529          Positioning and Restraints    Pre-Treatment Position in bed  -MS     Post Treatment Position chair  -MS     In Chair reclined;call light within reach;encouraged to call for assist;exit alarm on  -MS               User Key  (r) = Recorded By, (t) = Taken By, (c) = Cosigned By      Initials Name Provider Type    MS ZavaletaGuadalupe, PT Physical Therapist                   Outcome Measures       Row Name 06/06/23 1544 06/06/23 1400       How much help from another person do you currently need...    Turning from your back to your side while in flat bed without using bedrails? 3  -MS 3  -TM    Moving from lying on back to sitting on the side of a flat bed without bedrails? 3  -MS 2  -TM    Moving to and from a bed to a chair (including a wheelchair)? 3  -MS 2  -TM    Standing up from a chair using your arms (e.g., wheelchair, bedside chair)? 3  -MS 2  -TM    Climbing 3-5 steps with a railing? 2  -MS 1  -TM    To walk in hospital room? 3  -MS 2  -TM    AM-PAC 6 Clicks Score (PT) 17  -MS 12  -TM    Highest level of mobility 5 --> Static standing  -MS 4 --> Transferred to chair/commode  -TM              User Key  (r) =  Recorded By, (t) = Taken By, (c) = Cosigned By      Initials Name Provider Type    Mary Kate Murdock RN Registered Nurse    MS Guadalupe Zavaleta, PT Physical Therapist                                 Physical Therapy Education       Title: PT OT SLP Therapies (In Progress)       Topic: Physical Therapy (In Progress)       Point: Mobility training (Done)       Learning Progress Summary             Patient Acceptance, E,TB, VU by MS at 6/6/2023 1544                         Point: Home exercise program (Done)       Learning Progress Summary             Patient Acceptance, E,TB, VU by MS at 6/6/2023 1544                         Point: Body mechanics (Done)       Learning Progress Summary             Patient Acceptance, E,TB, VU by MS at 6/6/2023 1544                         Point: Precautions (Not Started)       Learner Progress:  Not documented in this visit.                              User Key       Initials Effective Dates Name Provider Type Discipline    MS 06/16/21 -  Guadalupe Zavaleta PT Physical Therapist PT                  PT Recommendation and Plan  Planned Therapy Interventions (PT): balance training, bed mobility training, gait training, home exercise program, patient/family education, postural re-education, ROM (range of motion), stair training, strengthening, transfer training        Time Calculation:    PT Charges       Row Name 06/06/23 1542             Time Calculation    Start Time 1508  -MS      Stop Time 1540  -MS      Time Calculation (min) 32 min  -MS      PT Received On 06/06/23  -MS      PT - Next Appointment 06/07/23  -MS      PT Goal Re-Cert Due Date 06/10/23  -MS                User Key  (r) = Recorded By, (t) = Taken By, (c) = Cosigned By      Initials Name Provider Type    MS Guadalupe Zavaleta, PT Physical Therapist                  Therapy Charges for Today       Code Description Service Date Service Provider Modifiers Qty    58741734427 HC PT EVAL LOW COMPLEXITY 2 6/6/2023  Guadalupe Zavaleta, PT GP 1    [id number]  PT THER PROC EA 15 MIN 6/6/2023 Guadalupe Zavaleta, PT GP 1    97370911940 HC PT THERAPEUTIC ACT EA 15 MIN 6/6/2023 Guadalupe Zavaleta, PT GP 1            PT G-Codes  AM-PAC 6 Clicks Score (PT): 17  PT Discharge Summary  Anticipated Discharge Disposition (PT): home with assist, home with home health    Guadalupe Zavaleta, PT  6/6/2023

## 2023-06-07 VITALS
TEMPERATURE: 98 F | SYSTOLIC BLOOD PRESSURE: 130 MMHG | BODY MASS INDEX: 45.96 KG/M2 | OXYGEN SATURATION: 95 % | RESPIRATION RATE: 18 BRPM | HEART RATE: 56 BPM | WEIGHT: 286 LBS | DIASTOLIC BLOOD PRESSURE: 60 MMHG | HEIGHT: 66 IN

## 2023-06-07 LAB
ANION GAP SERPL CALCULATED.3IONS-SCNC: 10.9 MMOL/L (ref 5–15)
BUN SERPL-MCNC: 19 MG/DL (ref 8–23)
BUN/CREAT SERPL: 16.7 (ref 7–25)
CALCIUM SPEC-SCNC: 8.7 MG/DL (ref 8.6–10.5)
CHLORIDE SERPL-SCNC: 98 MMOL/L (ref 98–107)
CO2 SERPL-SCNC: 25.1 MMOL/L (ref 22–29)
CREAT SERPL-MCNC: 1.14 MG/DL (ref 0.76–1.27)
DEPRECATED RDW RBC AUTO: 37.7 FL (ref 37–54)
EGFRCR SERPLBLD CKD-EPI 2021: 70.9 ML/MIN/1.73
ERYTHROCYTE [DISTWIDTH] IN BLOOD BY AUTOMATED COUNT: 12.2 % (ref 12.3–15.4)
GLUCOSE SERPL-MCNC: 145 MG/DL (ref 65–99)
HCT VFR BLD AUTO: 37.1 % (ref 37.5–51)
HGB BLD-MCNC: 12.3 G/DL (ref 13–17.7)
MCH RBC QN AUTO: 28.1 PG (ref 26.6–33)
MCHC RBC AUTO-ENTMCNC: 33.2 G/DL (ref 31.5–35.7)
MCV RBC AUTO: 84.7 FL (ref 79–97)
PLATELET # BLD AUTO: 257 10*3/MM3 (ref 140–450)
PMV BLD AUTO: 9.8 FL (ref 6–12)
POTASSIUM SERPL-SCNC: 3.7 MMOL/L (ref 3.5–5.2)
RBC # BLD AUTO: 4.38 10*6/MM3 (ref 4.14–5.8)
SODIUM SERPL-SCNC: 134 MMOL/L (ref 136–145)
WBC NRBC COR # BLD: 9.92 10*3/MM3 (ref 3.4–10.8)

## 2023-06-07 PROCEDURE — 97110 THERAPEUTIC EXERCISES: CPT

## 2023-06-07 PROCEDURE — G0378 HOSPITAL OBSERVATION PER HR: HCPCS

## 2023-06-07 PROCEDURE — 85027 COMPLETE CBC AUTOMATED: CPT | Performed by: ORTHOPAEDIC SURGERY

## 2023-06-07 PROCEDURE — 80048 BASIC METABOLIC PNL TOTAL CA: CPT | Performed by: ORTHOPAEDIC SURGERY

## 2023-06-07 RX ADMIN — ASPIRIN 325 MG: 325 TABLET, COATED ORAL at 08:46

## 2023-06-07 RX ADMIN — PANTOPRAZOLE SODIUM 40 MG: 40 TABLET, DELAYED RELEASE ORAL at 05:34

## 2023-06-07 RX ADMIN — FERROUS SULFATE TAB 325 MG (65 MG ELEMENTAL FE) 325 MG: 325 (65 FE) TAB at 08:46

## 2023-06-07 RX ADMIN — OXYCODONE HYDROCHLORIDE AND ACETAMINOPHEN 1 TABLET: 10; 325 TABLET ORAL at 10:19

## 2023-06-07 RX ADMIN — OXYCODONE HYDROCHLORIDE AND ACETAMINOPHEN 1 TABLET: 10; 325 TABLET ORAL at 05:34

## 2023-06-07 RX ADMIN — Medication 10 ML: at 10:19

## 2023-06-07 RX ADMIN — METOPROLOL TARTRATE 12.5 MG: 25 TABLET, FILM COATED ORAL at 08:46

## 2023-06-07 RX ADMIN — GABAPENTIN 600 MG: 300 CAPSULE ORAL at 08:46

## 2023-06-07 RX ADMIN — OXYCODONE HYDROCHLORIDE AND ACETAMINOPHEN 1 TABLET: 10; 325 TABLET ORAL at 01:40

## 2023-06-07 RX ADMIN — ATORVASTATIN CALCIUM 20 MG: 20 TABLET, FILM COATED ORAL at 08:46

## 2023-06-07 RX ADMIN — DULOXETINE HYDROCHLORIDE 30 MG: 30 CAPSULE, DELAYED RELEASE ORAL at 08:46

## 2023-06-07 RX ADMIN — TRIAMTERENE AND HYDROCHLOROTHIAZIDE 1 TABLET: 37.5; 25 TABLET ORAL at 08:46

## 2023-06-07 NOTE — PROGRESS NOTES
Orthopaedic Surgery  Progress Note  6/7/2023    Patients Name:  Armando Dixon Jr.  YOB: 1955  Age: 67 y.o.  Medical Records Number:  [id number]  Date of Admission: 6/6/2023    No complaints except appropriate pain and stiffness in the right knee    Vitals:  Vitals:    06/06/23 1829 06/06/23 2111 06/07/23 0116 06/07/23 0539   BP: 138/72 161/76 143/71 134/65   BP Location: Right arm Right arm Right arm Right arm   Patient Position: Sitting Sitting Lying Sitting   Pulse: 70 62 115 61   Resp: 16 16 16 16   Temp: 97.7 °F (36.5 °C) 97.6 °F (36.4 °C) 98.1 °F (36.7 °C) 97.9 °F (36.6 °C)   TempSrc: Oral Oral Oral Oral   SpO2: 97% 95% 96% 95%   Weight:       Height:           RLE:  NVI, calf nontender, sensation intact  No signs of DVT    Incision: clean, no signs of infection    Lab Results (last 24 hours)       Procedure Component Value Units Date/Time    Basic Metabolic Panel [[id number]] Updated: 06/07/23 0630    Specimen: Blood     CBC (No Diff) [[id number]]  (Abnormal) Collected: 06/07/23 0511    Specimen: Blood Updated: 06/07/23 0554     WBC 9.92 10*3/mm3      RBC 4.38 10*6/mm3      Hemoglobin 12.3 g/dL      Hematocrit 37.1 %      MCV 84.7 fL      MCH 28.1 pg      MCHC 33.2 g/dL      RDW 12.2 %      RDW-SD 37.7 fl      MPV 9.8 fL      Platelets 257 10*3/mm3             Adult Transthoracic Echo Complete W/ Cont if Necessary Per Protocol    Result Date: 5/25/2023  Narrative:   Left ventricular ejection fraction appears to be 66 - 70%.   Left ventricular diastolic function was normal.     XR Knee 1 or 2 View Right    Result Date: 6/6/2023  Narrative: XR KNEE 1 OR 2 VW RIGHT-  INDICATIONS: Postoperative evaluation.  TECHNIQUE: Frontal and lateral views of the right knee  COMPARISON: 05/26/2023  FINDINGS:   Intact appearing knee arthroplasty hardware is seen with adjacent surgical soft tissue gas, drain, overlying skin staples. No acute fracture is identified.       Impression:  Postsurgical changes.     This report was finalized on 6/6/2023 12:50 PM by Dr. Jean Paul Skinner M.D.      XR Knee 1 or 2 View Right    Result Date: 5/26/2023  Narrative: PROCEDURE:  XR KNEE 1 OR 2 VW RIGHT-  HISTORY: Preop.  COMPARISON: None.  FINDINGS:   2 views of the right knee were obtained.  No acute fracture or malalignment is identified.  There is advanced tricompartmental osteoarthritis consisting of joint space narrowing, subchondral sclerosis and cyst formation, and small marginal osteophytes, most pronounced at the medial knee compartment, where it is advanced with bone-on-bone articulation.  There is a trace knee effusion.  This report was finalized on 5/26/2023 11:33 AM by Dr. Guadalupe Lima M.D.      Peripheral Block    Result Date: 6/6/2023  Narrative: Jeff Jay MD     6/6/2023  9:44 AM Peripheral Block Pre-sedation assessment completed: 6/6/2023 9:37 AM Patient reassessed immediately prior to procedure Patient location during procedure: pre-op Start time: 6/6/2023 9:38 AM Stop time: 6/6/2023 9:40 AM Reason for block: at surgeon's request and post-op pain management Performed by Anesthesiologist: Jeff Jay MD Preanesthetic Checklist Completed: patient identified, IV checked, site marked, risks and benefits discussed, surgical consent, monitors and equipment checked, pre-op evaluation and timeout performed Prep: Pt Position: supine Sterile barriers:mask, gloves and cap Prep: ChloraPrep Patient monitoring: blood pressure monitoring, continuous pulse oximetry and EKG Procedure Sedation: yes Performed under: local infiltration Guidance:ultrasound guided ULTRASOUND INTERPRETATION.  Using ultrasound guidance a 21 G gauge needle was placed in close proximity to the nerve, at which point, under ultrasound guidance anesthetic was injected in the area of the nerve and spread of the anesthesia was seen on ultrasound in close proximity thereto.  There were no abnormalities seen on ultrasound; a digital image was  taken; and the patient tolerated the procedure with no complications. Images:still images obtained, printed/placed on chart Laterality:right Block Type:adductor canal block Injection Technique:single-shot Needle Type:echogenic and Tuohy Needle Gauge:21 G Resistance on Injection: none Medications Used: ropivacaine (NAROPIN) 0.5 % injection - Injection  14 mL - 6/6/2023 9:38:00 AM dexamethasone (DECADRON) injection - Injection  4 mg - 6/6/2023 9:38:00 AM Post Assessment Injection Assessment: negative aspiration for heme, no paresthesia on injection and incremental injection Patient Tolerance:comfortable throughout block Complications:no Additional Notes Ultrasound guidance used to visualize nerve anatomy, guide needle placement and verify local anesthetic disbursement.     Stress Test With Myocardial Perfusion One Day    Result Date: 5/25/2023  Narrative:   Findings consistent with a normal ECG stress test.   Left ventricular ejection fraction is normal (Calculated EF = 60%).   Myocardial perfusion imaging indicates a normal myocardial perfusion study with no evidence of ischemia.   Impressions are consistent with a low risk study. Asymptomatic for chest pain. ECG is negative for ischemia. Ectopy: none B/P: Appropriate for Beta-blocker therapy Baseline 117/53, Peak (post Lexiscan) 98/40, Recovery:109//38 Pharmacologic study due to inability to tolerate increasing speed and grade of treadmill due to orthopedic, mobility  issues and Beta-blocker therapy.  Unable to participate in low level exercise due to unsteady gait, poor balance and poor mobility. Uses cane for ambulation. Supervised by: Dr. Adams     XR Chest PA & Lateral    Result Date: 5/26/2023  Narrative: XR CHEST PA AND LATERAL-  HISTORY:  Preop.  COMPARISON:  Chest radiograph 01/25/2020  FINDINGS:  2 views of the chest were obtained.  Support Devices:  None. Cardiac Silhouette/Mediastinum/Roseanne:  The cardiac, mediastinal, and hilar contours are  not significantly changed. There is a coronary artery stent, which is new. Lungs/Pleural Spaces:  There is no focal consolidation. Pleural spaces are clear. Chest Wall/Diaphragm/Upper Abdomen:  There is multilevel degenerative disc disease.   CONCLUSION(S):   1.  No focal consolidation or effusion.  This report was finalized on 5/26/2023 11:36 AM by Dr. Guadalupe Lima M.D.       Assesment/Plan:    Procedures:  Right TKA  Postoperative Day: 1  Weightbearing Status:  WBAT with walker  DVT Prophylaxis:  ASA for DVT prophylaxis    Disposition:  Home with home health after PT today, if comfortable and mobilizing safely    Bonifacio BLANCHARD [facility]  [address] [place] [address]  [phone]    6/7/2023  Orthopaedic Surgery  Progress Note  6/7/2023    Patients Name:  Armando Dixon Jr.  YOB: 1955  Age: 67 y.o.  Medical Records Number:  [id number]  Date of Admission: 6/6/2023    No complaints except appropriate pain and stiffness in the right knee    Vitals:  Vitals:    06/06/23 1829 06/06/23 2111 06/07/23 0116 06/07/23 0539   BP: 138/72 161/76 143/71 134/65   BP Location: Right arm Right arm Right arm Right arm   Patient Position: Sitting Sitting Lying Sitting   Pulse: 70 62 115 61   Resp: 16 16 16 16   Temp: 97.7 °F (36.5 °C) 97.6 °F (36.4 °C) 98.1 °F (36.7 °C) 97.9 °F (36.6 °C)   TempSrc: Oral Oral Oral Oral   SpO2: 97% 95% 96% 95%   Weight:       Height:           RLE:  NVI, calf nontender, sensation intact  No signs of DVT    Incision: clean, no signs of infection    Lab Results (last 24 hours)       Procedure Component Value Units Date/Time    Basic Metabolic Panel [[id number]] Updated: 06/07/23 0630    Specimen: Blood     CBC (No Diff) [[id number]]  (Abnormal) Collected: 06/07/23 0511    Specimen: Blood Updated: 06/07/23 0554     WBC 9.92 10*3/mm3      RBC 4.38 10*6/mm3      Hemoglobin 12.3 g/dL      Hematocrit 37.1 %      MCV 84.7 fL      MCH  28.1 pg      MCHC 33.2 g/dL      RDW 12.2 %      RDW-SD 37.7 fl      MPV 9.8 fL      Platelets 257 10*3/mm3             Adult Transthoracic Echo Complete W/ Cont if Necessary Per Protocol    Result Date: 5/25/2023  Narrative:   Left ventricular ejection fraction appears to be 66 - 70%.   Left ventricular diastolic function was normal.     XR Knee 1 or 2 View Right    Result Date: 6/6/2023  Narrative: XR KNEE 1 OR 2 VW RIGHT-  INDICATIONS: Postoperative evaluation.  TECHNIQUE: Frontal and lateral views of the right knee  COMPARISON: 05/26/2023  FINDINGS:   Intact appearing knee arthroplasty hardware is seen with adjacent surgical soft tissue gas, drain, overlying skin staples. No acute fracture is identified.       Impression:  Postsurgical changes.    This report was finalized on 6/6/2023 12:50 PM by Dr. Jean Paul Skinner M.D.      XR Knee 1 or 2 View Right    Result Date: 5/26/2023  Narrative: PROCEDURE:  XR KNEE 1 OR 2 VW RIGHT-  HISTORY: Preop.  COMPARISON: None.  FINDINGS:   2 views of the right knee were obtained.  No acute fracture or malalignment is identified.  There is advanced tricompartmental osteoarthritis consisting of joint space narrowing, subchondral sclerosis and cyst formation, and small marginal osteophytes, most pronounced at the medial knee compartment, where it is advanced with bone-on-bone articulation.  There is a trace knee effusion.  This report was finalized on 5/26/2023 11:33 AM by Dr. Guadalupe Lima M.D.      Peripheral Block    Result Date: 6/6/2023  Narrative: Jeff Jay MD     6/6/2023  9:44 AM Peripheral Block Pre-sedation assessment completed: 6/6/2023 9:37 AM Patient reassessed immediately prior to procedure Patient location during procedure: pre-op Start time: 6/6/2023 9:38 AM Stop time: 6/6/2023 9:40 AM Reason for block: at surgeon's request and post-op pain management Performed by Anesthesiologist: Jeff Jay MD Preanesthetic Checklist Completed: patient  identified, IV checked, site marked, risks and benefits discussed, surgical consent, monitors and equipment checked, pre-op evaluation and timeout performed Prep: Pt Position: supine Sterile barriers:mask, gloves and cap Prep: ChloraPrep Patient monitoring: blood pressure monitoring, continuous pulse oximetry and EKG Procedure Sedation: yes Performed under: local infiltration Guidance:ultrasound guided ULTRASOUND INTERPRETATION.  Using ultrasound guidance a 21 G gauge needle was placed in close proximity to the nerve, at which point, under ultrasound guidance anesthetic was injected in the area of the nerve and spread of the anesthesia was seen on ultrasound in close proximity thereto.  There were no abnormalities seen on ultrasound; a digital image was taken; and the patient tolerated the procedure with no complications. Images:still images obtained, printed/placed on chart Laterality:right Block Type:adductor canal block Injection Technique:single-shot Needle Type:echogenic and Tuohy Needle Gauge:21 G Resistance on Injection: none Medications Used: ropivacaine (NAROPIN) 0.5 % injection - Injection  14 mL - 6/6/2023 9:38:00 AM dexamethasone (DECADRON) injection - Injection  4 mg - 6/6/2023 9:38:00 AM Post Assessment Injection Assessment: negative aspiration for heme, no paresthesia on injection and incremental injection Patient Tolerance:comfortable throughout block Complications:no Additional Notes Ultrasound guidance used to visualize nerve anatomy, guide needle placement and verify local anesthetic disbursement.     Stress Test With Myocardial Perfusion One Day    Result Date: 5/25/2023  Narrative:   Findings consistent with a normal ECG stress test.   Left ventricular ejection fraction is normal (Calculated EF = 60%).   Myocardial perfusion imaging indicates a normal myocardial perfusion study with no evidence of ischemia.   Impressions are consistent with a low risk study. Asymptomatic for chest pain. ECG is  negative for ischemia. Ectopy: none B/P: Appropriate for Beta-blocker therapy Baseline 117/53, Peak (post Lexiscan) 98/40, Recovery:109//38 Pharmacologic study due to inability to tolerate increasing speed and grade of treadmill due to orthopedic, mobility  issues and Beta-blocker therapy.  Unable to participate in low level exercise due to unsteady gait, poor balance and poor mobility. Uses cane for ambulation. Supervised by: Dr. Adams     XR Chest PA & Lateral    Result Date: 5/26/2023  Narrative: XR CHEST PA AND LATERAL-  HISTORY:  Preop.  COMPARISON:  Chest radiograph 01/25/2020  FINDINGS:  2 views of the chest were obtained.  Support Devices:  None. Cardiac Silhouette/Mediastinum/Roseanne:  The cardiac, mediastinal, and hilar contours are not significantly changed. There is a coronary artery stent, which is new. Lungs/Pleural Spaces:  There is no focal consolidation. Pleural spaces are clear. Chest Wall/Diaphragm/Upper Abdomen:  There is multilevel degenerative disc disease.   CONCLUSION(S):   1.  No focal consolidation or effusion.  This report was finalized on 5/26/2023 11:36 AM by Dr. Gaudalupe Lima M.D.       Assesment/Plan:    Procedures:  Right TKA  Postoperative Day: 1  Weightbearing Status:  WBAT with walker  DVT Prophylaxis:  ASA for DVT prophylaxis    Disposition:  Home with home health after PT today, if comfortable and mobilizing safely    Bonifacio Slater  [facility]  [address] [place] [address]  [phone]    6/7/2023

## 2023-06-07 NOTE — PLAN OF CARE
Problem: Fall Injury Risk  Goal: Absence of Fall and Fall-Related Injury  Outcome: Ongoing, Progressing     Problem: Pain Acute  Goal: Acceptable Pain Control and Functional Ability  Outcome: Ongoing, Progressing   Goal Outcome Evaluation:           Progress: improving  Outcome Evaluation: Pt appeared to sleep well, slept in chair all shift, states he does this at home, alert and oriented x4, coop with care, wore cpap at hs, dressing with dried drainage, hemovac intact and draining, ice applied prn, used IS appropriately, pain managed with oral meds, uses urinal at edge of chair, probable d/c today.

## 2023-06-07 NOTE — PLAN OF CARE
Goal Outcome Evaluation:  Plan of Care Reviewed With: patient        Progress: improving  Outcome Evaluation: Progressing towards goals with PT. Patient required SBA for transfers and ambulated 100' SV-SBA with a RW. No new complaints. Tolerated exs protocol progression. Okay to DC home from PT standpoint. Reviewed living environment andpatient denies any further concerns for home- plans to take home bucket of BSC from home to assist with toileting efficiency. No further acute PT needs.

## 2023-06-07 NOTE — PROGRESS NOTES
Continued Stay Note  [facility]     Patient Name: Armando Dixon Jr.  MRN: [id number]  Today's Date: 6/7/2023    Admit Date: 6/6/2023        Discharge Plan       Row Name 06/07/23 1546       Plan    Final Discharge Disposition Code 01 - home or self-care    Final Note [facility]                   Discharge Codes    No documentation.                 Expected Discharge Date and Time       Expected Discharge Date Expected Discharge Time    Jun 7, 2023               Ofelia Cuadra RN

## 2023-06-07 NOTE — THERAPY DISCHARGE NOTE
Patient Name: Armando Dixon Jr.  : 1955    MRN: [id number]                              Today's Date: 2023       Admit Date: 2023    Visit Dx:     ICD-10-CM ICD-9-CM   1. Primary osteoarthritis of right knee  M17.11 715.16     Patient Active Problem List   Diagnosis    Essential hypertension    Class 1 obesity due to excess calories without serious comorbidity with body mass index (BMI) of 30.0 to 30.9 in adult    Mixed hyperlipidemia    Abnormal cardiac function test    Coronary artery disease involving native coronary artery of native heart without angina pectoris    Abnormal electrocardiogram    Preoperative examination, unspecified    Primary osteoarthritis of right knee     Past Medical History:   Diagnosis Date    Allergic     Anxiety     Arthritis     Asthma     CHILDOOD    Coronary artery disease     STENT X 2    Cut of lower leg 2023    MULTIPLE-RIGHT-TO NOTIFY DR. RAJPUT    DDD (degenerative disc disease), cervical     LUMBAR, THORACIC, SACRAL    Deep vein thrombosis     Depression     Esophagitis with gastritis     GERD (gastroesophageal reflux disease)     History of kidney stones     Hypertension     Incisional hernia     Numbness     LEFT LATERAL THIGH S/P HIP ARTHROPLASTY/MEDIAL PAIN    ENRRIQUE on CPAP     Osteoarthritis of right knee     Osteoporosis     Prediabetes     Prostatitis     Pulmonary embolism     Under care of pain management specialist     Urgency incontinence      Past Surgical History:   Procedure Laterality Date    CARDIAC CATHETERIZATION      CARDIAC CATHETERIZATION N/A 2020    Procedure: Left Heart Cath;  Surgeon: Minerva Adams MD;  Location:  [facility];  Service: Cardiology;  Laterality: N/A;    CARDIAC CATHETERIZATION N/A 2020    Procedure: Coronary angiography;  Surgeon: Minerva Adams MD;  Location:  [facility];  Service: Cardiology;  Laterality: N/A;    CARDIAC CATHETERIZATION N/A 2020     Procedure: Left ventriculography;  Surgeon: Minerva Adams MD;  Location:  [facility];  Service: Cardiology;  Laterality: N/A;    CARDIAC CATHETERIZATION N/A 02/28/2020    Procedure: Percutaneous Coronary Intervention;  Surgeon: Minerva Adams MD;  Location:  [facility];  Service: Cardiology;  Laterality: N/A;    CARDIAC CATHETERIZATION N/A 02/28/2020    Procedure: Stent FADY coronary;  Surgeon: Minerva Adams MD;  Location:  [facility];  Service: Cardiology;  Laterality: N/A;    CARPAL TUNNEL RELEASE Bilateral     CHOLECYSTECTOMY      COLONOSCOPY  10/06/2014    [facility]    ENDOSCOPY  08/29/2014    Severe esophagitis, gastritis, neg for H pylori, Walters's, and fungi    GASTRIC BYPASS      INCISIONAL HERNIA REPAIR      TOTAL HIP ARTHROPLASTY Bilateral     VENA CAVA FILTER INSERTION        General Information       Row Name 06/07/23 0934          Physical Therapy Time and Intention    Document Type discharge treatment  -MS     Mode of Treatment physical therapy  -MS       Row Name 06/07/23 0934          General Information    Existing Precautions/Restrictions fall  -MS       Row Name 06/07/23 0934          Cognition    Orientation Status (Cognition) oriented x 4  -MS       Row Name 06/07/23 0934          Safety Issues, Functional Mobility    Impairments Affecting Function (Mobility) pain;postural/trunk control;range of motion (ROM);strength;endurance/activity tolerance;balance  -MS     Comment, Safety Issues/Impairments (Mobility) Gait belt and non skid socks donned.  -MS               User Key  (r) = Recorded By, (t) = Taken By, (c) = Cosigned By      Initials Name Provider Type    MS Guadalupe Zavaleta PT Physical Therapist                   Mobility       Row Name 06/07/23 0935          Bed Mobility    Comment, (Bed Mobility) NT- [facility]  -MS       Row Name 06/07/23 0935          Transfers    Comment, (Transfers) Good safety awareness, reviewed  hand placement cues.  -MS       Row Name 06/07/23 0935          Sit-Stand Transfer    Sit-Stand [place] (Transfers) standby assist;verbal cues  -MS     Assistive Device (Sit-Stand Transfers) walker, front-wheeled  -MS       Row Name 06/07/23 0935          Gait/Stairs (Locomotion)    [place] Level (Gait) supervision;standby assist;verbal cues  -MS     Assistive Device (Gait) walker, front-wheeled  -MS     Distance in Feet (Gait) 100'  -MS     Deviations/Abnormal Patterns (Gait) dillon decreased;gait speed decreased;antalgic  -MS     Comment, (Gait/Stairs) Reviewed sequencing, no overt LOB.  -MS       Row Name 06/07/23 0935          Mobility    Extremity Weight-bearing Status right lower extremity  -MS     Right Lower Extremity (Weight-bearing Status) weight-bearing as tolerated (WBAT)  -MS               User Key  (r) = Recorded By, (t) = Taken By, (c) = Cosigned By      Initials Name Provider Type    Guadalupe Cardoso, PT Physical Therapist                   Obj/Interventions       Row Name 06/07/23 0936          Motor Skills    Therapeutic Exercise --  R TKA protocol x 10 reps  -MS       Row Name 06/07/23 0936          Balance    Static Sitting Balance supervision  -MS     Position, Sitting Balance sitting in chair  -MS     Static Standing Balance standby assist  -MS     Position/Device Used, Standing Balance supported;walker, rolling  -MS               User Key  (r) = Recorded By, (t) = Taken By, (c) = Cosigned By      Initials Name Provider Type    Guadalupe Cardoso, PT Physical Therapist                   Goals/Plan    No documentation.                  Clinical Impression       Row Name 06/07/23 0936          Pain    Pretreatment Pain Rating 6/10  -MS     Posttreatment Pain Rating 6/10  -MS     Pain Location - Side/Orientation Right  -MS     Pain Location incisional  -MS     Pain Location - knee  -MS     Pain Intervention(s) Repositioned;Ambulation/increased activity;Rest  -MS       Row Name  06/07/23 0936          Plan of Care Review    Plan of Care Reviewed With patient  -MS     Progress improving  -MS     Outcome Evaluation Progressing towards goals with PT. Patient required SBA for transfers and ambulated 100' SV-SBA with a RW. No new complaints. Tolerated exs protocol progression. Okay to DC home from PT standpoint. Reviewed living environment andpatient denies any further concerns for home- plans to take home bucket of BSC from home to assist with toileting efficiency. No further acute PT needs.  -MS       Row Name 06/07/23 0936          Vital Signs    O2 Delivery Pre Treatment room air  -MS       Row Name 06/07/23 0936          Positioning and Restraints    Pre-Treatment Position sitting in chair/recliner  -MS     Post Treatment Position chair  -MS     In Chair notified nsg;reclined;call light within reach;encouraged to call for assist;exit alarm on  -MS               User Key  (r) = Recorded By, (t) = Taken By, (c) = Cosigned By      Initials Name Provider Type    Guadalupe Cardoso, PT Physical Therapist                   Outcome Measures       Row Name 06/07/23 0940          How much help from another person do you currently need...    Turning from your back to your side while in flat bed without using bedrails? 3  -MS     Moving from lying on back to sitting on the side of a flat bed without bedrails? 3  -MS     Moving to and from a bed to a chair (including a wheelchair)? 4  -MS     Standing up from a chair using your arms (e.g., wheelchair, bedside chair)? 4  -MS     Climbing 3-5 steps with a railing? 2  -MS     To walk in hospital room? 3  -MS     AM-PAC 6 Clicks Score (PT) 19  -MS     Highest level of mobility 6 --> Walked 10 steps or more  -MS               User Key  (r) = Recorded By, (t) = Taken By, (c) = Cosigned By      Initials Name Provider Type    Guadalupe Cardoso, PT Physical Therapist                  Physical Therapy Education       Title: PT OT SLP Therapies (In  Progress)       Topic: Physical Therapy (In Progress)       Point: Mobility training (Done)       Learning Progress Summary             Patient Acceptance, E,TB, VU by MS at 6/7/2023 0954    Acceptance, E,TB, VU by MS at 6/6/2023 1544                         Point: Home exercise program (Done)       Learning Progress Summary             Patient Acceptance, E,TB, VU by MS at 6/7/2023 0954    Acceptance, E,TB, VU by MS at 6/6/2023 1544                         Point: Body mechanics (Done)       Learning Progress Summary             Patient Acceptance, E,TB, VU by MS at 6/7/2023 0954    Acceptance, E,TB, VU by MS at 6/6/2023 1544                         Point: Precautions (Not Started)       Learner Progress:  Not documented in this visit.                              User Key       Initials Effective Dates Name Provider Type Discipline    MS 06/16/21 -  Guadalupe Zavaleta, PT Physical Therapist PT                  PT Recommendation and Plan  Planned Therapy Interventions (PT): balance training, bed mobility training, gait training, home exercise program, patient/family education, postural re-education, ROM (range of motion), stair training, strengthening, transfer training  Plan of Care Reviewed With: patient  Progress: improving  Outcome Evaluation: Progressing towards goals with PT. Patient required SBA for transfers and ambulated 100' SV-SBA with a RW. No new complaints. Tolerated exs protocol progression. Okay to DC home from PT standpoint. Reviewed living environment andpatient denies any further concerns for home- plans to take home bucket of BSC from home to assist with toileting efficiency. No further acute PT needs.     Time Calculation:    PT Charges       Row Name 06/07/23 0952             Time Calculation    Start Time 0918  -MS      Stop Time 0950  -MS      Time Calculation (min) 32 min  -MS      PT Received On 06/07/23  -MS                User Key  (r) = Recorded By, (t) = Taken By, (c) = Cosigned By       Initials Name Provider Type    MS ZavaletaGuadalupe, PT Physical Therapist                  Therapy Charges for Today       Code Description Service Date Service Provider Modifiers Qty    36785728980 HC PT EVAL LOW COMPLEXITY 2 6/6/2023 Guadalupe Zavaleta, PT GP 1    [id number]  PT THER PROC EA 15 MIN 6/6/2023 Guadalupe Zavaleta, PT GP 1    [id number]  PT THERAPEUTIC ACT EA 15 MIN 6/6/2023 Guadalupe Zavaleta, PT GP 1    [id number]  PT THER PROC EA 15 MIN 6/7/2023 Guadalupe Zavaleta, PT GP 2            PT G-Codes  AM-PAC 6 Clicks Score (PT): 19    PT Discharge Summary  Anticipated Discharge Disposition (PT): home with assist, home with home health    Guadalupe Zavaleta, PT  6/7/2023

## 2023-06-08 RX ORDER — TRIAMTERENE AND HYDROCHLOROTHIAZIDE 37.5; 25 MG/1; MG/1
CAPSULE ORAL
Qty: 30 CAPSULE | Refills: 3 | Status: SHIPPED | OUTPATIENT
Start: 2023-06-08

## 2023-06-13 ENCOUNTER — TELEPHONE (OUTPATIENT)
Dept: ORTHOPEDIC SURGERY | Facility: HOSPITAL | Age: 68
End: 2023-06-13
Payer: MEDICARE

## 2023-06-13 NOTE — TELEPHONE ENCOUNTER
Attempted to reach Mr. Dixon to see how he is doing as he is 1 week SP TKA. Message left at this time.     Mr. Dixon called me back at this time. He said he is doing well. He has more pain today. He had PT yesterday and they did some massaging, and manipulation which has caused him to be sore. He is doing his exercises and getting around. He is doing the bike though he hasn't been able to get up to 5 times a day yet. Dressing is good. He finally had a BM yesterday, and feels better today with that. All in all things are progressing, just going slower than he would like. Mr. Dixon doesn't have any questions for me at this time. He has my contact information should he need anything.

## 2023-09-18 NOTE — TELEPHONE ENCOUNTER
Rx Refill Note  Requested Prescriptions     Pending Prescriptions Disp Refills    metoprolol tartrate (LOPRESSOR) 25 MG tablet [Pharmacy Med Name: METOPROLOL TARTRATE 25 MG TAB] 90 tablet 1     Sig: TAKE 1/2 TABLET BY MOUTH TWICE A DAY      Last office visit with prescribing clinician: 5/25/2023   Last telemedicine visit with prescribing clinician: Visit date not found   Next office visit with prescribing clinician: Visit date not found                         Would you like a call back once the refill request has been completed: [] Yes [] No    If the office needs to give you a call back, can they leave a voicemail: [] Yes [] No    Ailyn Prado MA  09/18/23, 07:39 EDT

## 2023-09-21 RX ORDER — ATORVASTATIN CALCIUM 20 MG/1
20 TABLET, FILM COATED ORAL DAILY
Qty: 90 TABLET | Refills: 1 | Status: SHIPPED | OUTPATIENT
Start: 2023-09-21

## 2023-12-12 ENCOUNTER — HOSPITAL ENCOUNTER (OUTPATIENT)
Dept: GENERAL RADIOLOGY | Facility: HOSPITAL | Age: 68
Discharge: HOME | End: 2023-12-12
Payer: MEDICARE

## 2023-12-12 ENCOUNTER — PRE-ADMISSION TESTING (OUTPATIENT)
Dept: PREADMISSION TESTING | Facility: HOSPITAL | Age: 68
End: 2023-12-12
Payer: MEDICARE

## 2023-12-12 VITALS
TEMPERATURE: 97.2 F | SYSTOLIC BLOOD PRESSURE: 146 MMHG | BODY MASS INDEX: 47.58 KG/M2 | RESPIRATION RATE: 18 BRPM | OXYGEN SATURATION: 97 % | WEIGHT: 285.6 LBS | HEART RATE: 89 BPM | DIASTOLIC BLOOD PRESSURE: 75 MMHG | HEIGHT: 65 IN

## 2023-12-12 LAB
ALBUMIN SERPL-MCNC: 4.4 G/DL (ref 3.5–5.2)
ALBUMIN/GLOB SERPL: 2 G/DL
ALP SERPL-CCNC: 126 U/L (ref 39–117)
ALT SERPL W P-5'-P-CCNC: 19 U/L (ref 1–41)
ANION GAP SERPL CALCULATED.3IONS-SCNC: 10 MMOL/L (ref 5–15)
AST SERPL-CCNC: 20 U/L (ref 1–40)
BILIRUB SERPL-MCNC: 0.5 MG/DL (ref 0–1.2)
BILIRUB UR QL STRIP: NEGATIVE
BUN SERPL-MCNC: 19 MG/DL (ref 8–23)
BUN/CREAT SERPL: 16.2 (ref 7–25)
CALCIUM SPEC-SCNC: 9.6 MG/DL (ref 8.6–10.5)
CHLORIDE SERPL-SCNC: 102 MMOL/L (ref 98–107)
CLARITY UR: CLEAR
CO2 SERPL-SCNC: 26 MMOL/L (ref 22–29)
COLOR UR: YELLOW
CREAT SERPL-MCNC: 1.17 MG/DL (ref 0.76–1.27)
DEPRECATED RDW RBC AUTO: 40 FL (ref 37–54)
EGFRCR SERPLBLD CKD-EPI 2021: 68.3 ML/MIN/1.73
ERYTHROCYTE [DISTWIDTH] IN BLOOD BY AUTOMATED COUNT: 13 % (ref 12.3–15.4)
GLOBULIN UR ELPH-MCNC: 2.2 GM/DL
GLUCOSE SERPL-MCNC: 122 MG/DL (ref 65–99)
GLUCOSE UR STRIP-MCNC: NEGATIVE MG/DL
HCT VFR BLD AUTO: 39.6 % (ref 37.5–51)
HGB BLD-MCNC: 12.5 G/DL (ref 13–17.7)
HGB UR QL STRIP.AUTO: NEGATIVE
HOLD SPECIMEN: NORMAL
INR PPP: 1.02 (ref 0.9–1.1)
KETONES UR QL STRIP: NEGATIVE
LEUKOCYTE ESTERASE UR QL STRIP.AUTO: NEGATIVE
MCH RBC QN AUTO: 26.9 PG (ref 26.6–33)
MCHC RBC AUTO-ENTMCNC: 31.6 G/DL (ref 31.5–35.7)
MCV RBC AUTO: 85.3 FL (ref 79–97)
NITRITE UR QL STRIP: NEGATIVE
PH UR STRIP.AUTO: 5.5 [PH] (ref 5–8)
PLATELET # BLD AUTO: 225 10*3/MM3 (ref 140–450)
PMV BLD AUTO: 9.4 FL (ref 6–12)
POTASSIUM SERPL-SCNC: 4.4 MMOL/L (ref 3.5–5.2)
PROT SERPL-MCNC: 6.6 G/DL (ref 6–8.5)
PROT UR QL STRIP: NEGATIVE
PROTHROMBIN TIME: 13.5 SECONDS (ref 11.7–14.2)
RBC # BLD AUTO: 4.64 10*6/MM3 (ref 4.14–5.8)
SODIUM SERPL-SCNC: 138 MMOL/L (ref 136–145)
SP GR UR STRIP: 1.02 (ref 1–1.03)
UROBILINOGEN UR QL STRIP: NORMAL
WBC NRBC COR # BLD AUTO: 6.03 10*3/MM3 (ref 3.4–10.8)

## 2023-12-12 PROCEDURE — 73560 X-RAY EXAM OF KNEE 1 OR 2: CPT

## 2023-12-12 PROCEDURE — 36415 COLL VENOUS BLD VENIPUNCTURE: CPT

## 2023-12-12 PROCEDURE — 81003 URINALYSIS AUTO W/O SCOPE: CPT

## 2023-12-12 PROCEDURE — 93005 ELECTROCARDIOGRAM TRACING: CPT

## 2023-12-12 PROCEDURE — 85027 COMPLETE CBC AUTOMATED: CPT

## 2023-12-12 PROCEDURE — 80053 COMPREHEN METABOLIC PANEL: CPT

## 2023-12-12 PROCEDURE — 71046 X-RAY EXAM CHEST 2 VIEWS: CPT

## 2023-12-12 PROCEDURE — 85610 PROTHROMBIN TIME: CPT

## 2023-12-12 RX ORDER — DICLOFENAC SODIUM 75 MG/1
75 TABLET, DELAYED RELEASE ORAL 2 TIMES DAILY
COMMUNITY

## 2023-12-12 RX ORDER — DULOXETIN HYDROCHLORIDE 30 MG/1
30 CAPSULE, DELAYED RELEASE ORAL 3 TIMES DAILY
COMMUNITY
Start: 2023-12-11

## 2023-12-12 RX ORDER — LISDEXAMFETAMINE DIMESYLATE CAPSULES 50 MG/1
50 CAPSULE ORAL EVERY MORNING
COMMUNITY

## 2023-12-12 NOTE — DISCHARGE INSTRUCTIONS
ARRIVE DAY OF SURGERY AS NOTIFIED BY DR HARDY OFFICE        Take the following medications the morning of surgery:  GABAPENTIN, METOPROLOL, DULOXETINE      If you are on prescription narcotic pain medication to control your pain you may also take that medication the morning of surgery.    General Instructions:  Do not eat solid food after midnight the night before surgery.  You may drink clear liquids day of surgery but must stop at least one hour before your hospital arrival time.  It is beneficial for you to have a clear drink that contains carbohydrates the day of surgery.  We suggest a 12 to 20 ounce bottle of Gatorade or Powerade for non-diabetic patients or a 12 to 20 ounce bottle of G2 or Powerade Zero for diabetic patients. (Pediatric patients, are not advised to drink a 12 to 20 ounce carbohydrate drink)    Clear liquids are liquids you can see through.  Nothing red in color.     Plain water                               Sports drinks  Sodas                                   Gelatin (Jell-O)  Fruit juices without pulp such as white grape juice and apple juice  Popsicles that contain no fruit or yogurt  Tea or coffee (no cream or milk added)  Gatorade / Powerade  G2 / Powerade Zero    Infants may have breast milk up to four hours before surgery.  Infants drinking formula may drink formula up to six hours before surgery.   Patients who avoid smoking, chewing tobacco and alcohol for 4 weeks prior to surgery have a reduced risk of post-operative complications.  Quit smoking as many days before surgery as you can.  Do not smoke, use chewing tobacco or drink alcohol the day of surgery.   If applicable bring your C-PAP/ BI-PAP machine in with you to preop day of surgery.  Bring any papers given to you in the doctor’s office.  Wear clean comfortable clothes.  Do not wear contact lenses, false eyelashes or make-up.  Bring a case for your glasses.   Bring crutches or walker if applicable.  Remove all piercings.   Leave jewelry and any other valuables at home.  Hair extensions with metal clips must be removed prior to surgery.  The Pre-Admission Testing nurse will instruct you to bring medications if unable to obtain an accurate list in Pre-Admission Testing.          Preventing a Surgical Site Infection:  For 2 to 3 days before surgery, avoid shaving with a razor because the razor can irritate skin and make it easier to develop an infection.    Any areas of open skin can increase the risk of a post-operative wound infection by allowing bacteria to enter and travel throughout the body.  Notify your surgeon if you have any skin wounds / rashes even if it is not near the expected surgical site.  The area will need assessed to determine if surgery should be delayed until it is healed.  The night prior to surgery shower using a fresh bar of anti-bacterial soap (such as Dial) and clean washcloth.  Sleep in a clean bed with clean clothing.  Do not allow pets to sleep with you.  Shower on the morning of surgery using a fresh bar of anti-bacterial soap (such as Dial) and clean washcloth.  Dry with a clean towel and dress in clean clothing.  Ask your surgeon if you will be receiving antibiotics prior to surgery.  Make sure you, your family, and all healthcare providers clean their hands with soap and water or an alcohol based hand  before caring for you or your wound.    Day of surgery:  Your arrival time is approximately two hours before your scheduled surgery time.  Upon arrival, a Pre-op nurse and Anesthesiologist will review your health history, obtain vital signs, and answer questions you may have.  The only belongings needed at this time will be a list of your home medications and if applicable your C-PAP/BI-PAP machine.  A Pre-op nurse will start an IV and you may receive medication in preparation for surgery, including something to help you relax.     Please be aware that surgery does come with discomfort.  We want to  make every effort to control your discomfort so please discuss any uncontrolled symptoms with your nurse.   Your doctor will most likely have prescribed pain medications.      If you are going home after surgery you will receive individualized written care instructions before being discharged.  A responsible adult must drive you to and from the hospital on the day of your surgery and stay with you for 24 hours.  Discharge prescriptions can be filled by the hospital pharmacy during regular pharmacy hours.  If you are having surgery late in the day/evening your prescription may be e-prescribed to your pharmacy.  Please verify your pharmacy hours or chose a 24 hour pharmacy to avoid not having access to your prescription because your pharmacy has closed for the day.    If you are staying overnight following surgery, you will be transported to your hospital room following the recovery period.  [facility] has all private rooms.    If you have any questions please call Pre-Admission Testing at [phone].  Deductibles and co-payments are collected on the day of service. Please be prepared to pay the required co-pay, deductible or deposit on the day of service as defined by your plan.    Call your surgeon immediately if you experience any of the following symptoms:  Sore Throat  Shortness of Breath or difficulty breathing  Cough  Chills  Body soreness or muscle pain  Headache  Fever  New loss of taste or smell  Do not arrive for your surgery ill.  Your procedure will need to be rescheduled to another time.  You will need to call your physician before the day of surgery to avoid any unnecessary exposure to hospital staff as well as other patients.    CHLORHEXIDINE CLOTH INSTRUCTIONS  The morning of surgery follow these instructions using the Chlorhexidine cloths you've been given.  These steps reduce bacteria on the body.  Do not use the cloths near your eyes, ears mouth, genitalia or on open wounds.   Throw the cloths away after use but do not try to flush them down a toilet.      Open and remove one cloth at a time from the package.    Leave the cloth unfolded and begin the bathing.  Massage the skin with the cloths using gentle pressure to remove bacteria.  Do not scrub harshly.   Follow the steps below with one 2% CHG cloth per area (6 total cloths).  One cloth for neck, shoulders and chest.  One cloth for both arms, hands, fingers and underarms (do underarms last).  One cloth for the abdomen followed by groin.  One cloth for right leg and foot including between the toes.  One cloth for left leg and foot including between the toes.  The last cloth is to be used for the back of the neck, back and buttocks.    Allow the CHG to air dry 3 minutes on the skin which will give it time to work and decrease the chance of irritation.  The skin may feel sticky until it is dry.  Do not rinse with water or any other liquid or you will lose the beneficial effects of the CHG.  If mild skin irritation occurs, do rinse the skin to remove the CHG.  Report this to the nurse at time of admission.  Do not apply lotions, creams, ointments, deodorants or perfumes after using the clothes. Dress in clean clothes before coming to the hospital.

## 2023-12-13 LAB
QT INTERVAL: 370 MS
QTC INTERVAL: 391 MS

## 2023-12-21 RX ORDER — POVIDONE-IODINE 10 MG/ML
1 SOLUTION TOPICAL ONCE
Status: CANCELLED | OUTPATIENT
Start: 2023-12-21

## 2023-12-21 RX ORDER — CEFAZOLIN SODIUM 2 G/100ML
2000 INJECTION, SOLUTION INTRAVENOUS ONCE
Status: DISCONTINUED | OUTPATIENT
Start: 2023-12-21 | End: 2023-12-21

## 2023-12-26 ENCOUNTER — ANESTHESIA EVENT (OUTPATIENT)
Dept: PERIOP | Facility: HOSPITAL | Age: 68
End: 2023-12-26
Payer: MEDICARE

## 2023-12-26 ENCOUNTER — APPOINTMENT (OUTPATIENT)
Dept: GENERAL RADIOLOGY | Facility: HOSPITAL | Age: 68
End: 2023-12-26
Payer: MEDICARE

## 2023-12-26 ENCOUNTER — ANESTHESIA (OUTPATIENT)
Dept: PERIOP | Facility: HOSPITAL | Age: 68
End: 2023-12-26
Payer: MEDICARE

## 2023-12-26 ENCOUNTER — HOSPITAL ENCOUNTER (OUTPATIENT)
Facility: HOSPITAL | Age: 68
Setting detail: OBSERVATION
Discharge: HOME | End: 2023-12-27
Attending: ORTHOPAEDIC SURGERY | Admitting: ORTHOPAEDIC SURGERY
Payer: MEDICARE

## 2023-12-26 DIAGNOSIS — M17.12 PRIMARY OSTEOARTHRITIS OF LEFT KNEE: Primary | ICD-10-CM

## 2023-12-26 PROCEDURE — 25810000003 LACTATED RINGERS PER 1000 ML: Performed by: ANESTHESIOLOGY

## 2023-12-26 PROCEDURE — 25010000002 DEXAMETHASONE PER 1 MG: Performed by: ANESTHESIOLOGY

## 2023-12-26 PROCEDURE — 25010000002 EPINEPHRINE 1 MG/ML SOLUTION 30 ML VIAL: Performed by: ORTHOPAEDIC SURGERY

## 2023-12-26 PROCEDURE — C1713 ANCHOR/SCREW BN/BN,TIS/BN: HCPCS | Performed by: ORTHOPAEDIC SURGERY

## 2023-12-26 PROCEDURE — 25010000002 ROPIVACAINE PER 1 MG: Performed by: ANESTHESIOLOGY

## 2023-12-26 PROCEDURE — 25010000002 PROPOFOL 200 MG/20ML EMULSION: Performed by: NURSE ANESTHETIST, CERTIFIED REGISTERED

## 2023-12-26 PROCEDURE — C1776 JOINT DEVICE (IMPLANTABLE): HCPCS | Performed by: ORTHOPAEDIC SURGERY

## 2023-12-26 PROCEDURE — 25010000002 MEPIVACAINE HCL (PF) 1.5 % SOLUTION: Performed by: ANESTHESIOLOGY

## 2023-12-26 PROCEDURE — 25010000002 FENTANYL CITRATE (PF) 50 MCG/ML SOLUTION: Performed by: ANESTHESIOLOGY

## 2023-12-26 PROCEDURE — 25010000002 CEFAZOLIN PER 500 MG: Performed by: ORTHOPAEDIC SURGERY

## 2023-12-26 PROCEDURE — 25010000002 ROPIVACAINE PER 1 MG: Performed by: ORTHOPAEDIC SURGERY

## 2023-12-26 PROCEDURE — 25010000002 SUGAMMADEX 200 MG/2ML SOLUTION: Performed by: ANESTHESIOLOGY

## 2023-12-26 PROCEDURE — 25010000002 FENTANYL CITRATE (PF) 50 MCG/ML SOLUTION: Performed by: REGISTERED NURSE

## 2023-12-26 PROCEDURE — G0378 HOSPITAL OBSERVATION PER HR: HCPCS

## 2023-12-26 PROCEDURE — 25010000002 CLINDAMYCIN 900 MG/50ML SOLUTION: Performed by: ORTHOPAEDIC SURGERY

## 2023-12-26 PROCEDURE — 73560 X-RAY EXAM OF KNEE 1 OR 2: CPT

## 2023-12-26 PROCEDURE — 25010000002 MIDAZOLAM PER 1 MG: Performed by: ANESTHESIOLOGY

## 2023-12-26 PROCEDURE — 25010000002 MORPHINE PER 10 MG: Performed by: ORTHOPAEDIC SURGERY

## 2023-12-26 PROCEDURE — 25010000002 DEXAMETHASONE SODIUM PHOSPHATE 20 MG/5ML SOLUTION: Performed by: NURSE ANESTHETIST, CERTIFIED REGISTERED

## 2023-12-26 PROCEDURE — 25010000002 ONDANSETRON PER 1 MG: Performed by: ANESTHESIOLOGY

## 2023-12-26 PROCEDURE — 25010000002 FENTANYL CITRATE (PF) 50 MCG/ML SOLUTION: Performed by: NURSE ANESTHETIST, CERTIFIED REGISTERED

## 2023-12-26 PROCEDURE — 25010000002 KETOROLAC TROMETHAMINE PER 15 MG: Performed by: ORTHOPAEDIC SURGERY

## 2023-12-26 PROCEDURE — 25010000002 CEFAZOLIN IN DEXTROSE 2-4 GM/100ML-% SOLUTION: Performed by: ANESTHESIOLOGY

## 2023-12-26 DEVICE — IMPLANTABLE DEVICE
Type: IMPLANTABLE DEVICE | Site: KNEE | Status: FUNCTIONAL
Brand: BIOMET® BONE CEMENT R

## 2023-12-26 DEVICE — IMPLANTABLE DEVICE
Type: IMPLANTABLE DEVICE | Site: KNEE | Status: FUNCTIONAL
Brand: BIOMET® KNEE SYSTEM

## 2023-12-26 DEVICE — IMPLANTABLE DEVICE
Type: IMPLANTABLE DEVICE | Site: KNEE | Status: FUNCTIONAL
Brand: VANGUARD® KNEE SYSTEM

## 2023-12-26 DEVICE — CAP TOTL KN CMT PRIMARY: Type: IMPLANTABLE DEVICE | Status: FUNCTIONAL

## 2023-12-26 RX ORDER — OXYCODONE HYDROCHLORIDE AND ACETAMINOPHEN 5; 325 MG/1; MG/1
1-2 TABLET ORAL EVERY 4 HOURS PRN
Qty: 42 TABLET | Refills: 0 | Status: SHIPPED | OUTPATIENT
Start: 2023-12-26

## 2023-12-26 RX ORDER — SODIUM CHLORIDE, SODIUM LACTATE, POTASSIUM CHLORIDE, CALCIUM CHLORIDE 600; 310; 30; 20 MG/100ML; MG/100ML; MG/100ML; MG/100ML
9 INJECTION, SOLUTION INTRAVENOUS CONTINUOUS
Status: DISCONTINUED | OUTPATIENT
Start: 2023-12-26 | End: 2023-12-27 | Stop reason: HOSPADM

## 2023-12-26 RX ORDER — SODIUM CHLORIDE 0.9 % (FLUSH) 0.9 %
3-10 SYRINGE (ML) INJECTION AS NEEDED
Status: DISCONTINUED | OUTPATIENT
Start: 2023-12-26 | End: 2023-12-26 | Stop reason: HOSPADM

## 2023-12-26 RX ORDER — HYDROMORPHONE HYDROCHLORIDE 1 MG/ML
0.5 INJECTION, SOLUTION INTRAMUSCULAR; INTRAVENOUS; SUBCUTANEOUS
Status: DISCONTINUED | OUTPATIENT
Start: 2023-12-26 | End: 2023-12-26 | Stop reason: HOSPADM

## 2023-12-26 RX ORDER — MAGNESIUM HYDROXIDE 1200 MG/15ML
LIQUID ORAL AS NEEDED
Status: DISCONTINUED | OUTPATIENT
Start: 2023-12-26 | End: 2023-12-26 | Stop reason: HOSPADM

## 2023-12-26 RX ORDER — ACETAMINOPHEN 500 MG
1000 TABLET ORAL ONCE
Status: COMPLETED | OUTPATIENT
Start: 2023-12-26 | End: 2023-12-26

## 2023-12-26 RX ORDER — PROMETHAZINE HYDROCHLORIDE 25 MG/1
25 SUPPOSITORY RECTAL ONCE AS NEEDED
Status: DISCONTINUED | OUTPATIENT
Start: 2023-12-26 | End: 2023-12-26 | Stop reason: HOSPADM

## 2023-12-26 RX ORDER — DIPHENHYDRAMINE HYDROCHLORIDE 50 MG/ML
12.5 INJECTION INTRAMUSCULAR; INTRAVENOUS
Status: DISCONTINUED | OUTPATIENT
Start: 2023-12-26 | End: 2023-12-26 | Stop reason: HOSPADM

## 2023-12-26 RX ORDER — SODIUM CHLORIDE 0.9 % (FLUSH) 0.9 %
1-10 SYRINGE (ML) INJECTION AS NEEDED
Status: DISCONTINUED | OUTPATIENT
Start: 2023-12-26 | End: 2023-12-27 | Stop reason: HOSPADM

## 2023-12-26 RX ORDER — CEFAZOLIN SODIUM IN 0.9 % NACL 3 G/100 ML
3000 INTRAVENOUS SOLUTION, PIGGYBACK (ML) INTRAVENOUS ONCE
Status: COMPLETED | OUTPATIENT
Start: 2023-12-26 | End: 2023-12-26

## 2023-12-26 RX ORDER — EPHEDRINE SULFATE 50 MG/ML
5 INJECTION, SOLUTION INTRAVENOUS ONCE AS NEEDED
Status: DISCONTINUED | OUTPATIENT
Start: 2023-12-26 | End: 2023-12-26 | Stop reason: HOSPADM

## 2023-12-26 RX ORDER — CHOLECALCIFEROL (VITAMIN D3) 125 MCG
5 CAPSULE ORAL NIGHTLY PRN
Status: DISCONTINUED | OUTPATIENT
Start: 2023-12-26 | End: 2023-12-27 | Stop reason: HOSPADM

## 2023-12-26 RX ORDER — DROPERIDOL 2.5 MG/ML
0.62 INJECTION, SOLUTION INTRAMUSCULAR; INTRAVENOUS
Status: DISCONTINUED | OUTPATIENT
Start: 2023-12-26 | End: 2023-12-26 | Stop reason: HOSPADM

## 2023-12-26 RX ORDER — FLUMAZENIL 0.1 MG/ML
0.2 INJECTION INTRAVENOUS AS NEEDED
Status: DISCONTINUED | OUTPATIENT
Start: 2023-12-26 | End: 2023-12-26 | Stop reason: HOSPADM

## 2023-12-26 RX ORDER — SODIUM CHLORIDE 0.9 % (FLUSH) 0.9 %
3 SYRINGE (ML) INJECTION EVERY 12 HOURS SCHEDULED
Status: DISCONTINUED | OUTPATIENT
Start: 2023-12-26 | End: 2023-12-26 | Stop reason: HOSPADM

## 2023-12-26 RX ORDER — OXYCODONE AND ACETAMINOPHEN 7.5; 325 MG/1; MG/1
1 TABLET ORAL EVERY 4 HOURS PRN
Status: DISCONTINUED | OUTPATIENT
Start: 2023-12-26 | End: 2023-12-26 | Stop reason: HOSPADM

## 2023-12-26 RX ORDER — ONDANSETRON 4 MG/1
4 TABLET, ORALLY DISINTEGRATING ORAL EVERY 6 HOURS PRN
Status: DISCONTINUED | OUTPATIENT
Start: 2023-12-26 | End: 2023-12-27 | Stop reason: HOSPADM

## 2023-12-26 RX ORDER — ACETAMINOPHEN 325 MG/1
325 TABLET ORAL EVERY 4 HOURS PRN
Status: DISCONTINUED | OUTPATIENT
Start: 2023-12-26 | End: 2023-12-27 | Stop reason: HOSPADM

## 2023-12-26 RX ORDER — KETOROLAC TROMETHAMINE 15 MG/ML
15 INJECTION, SOLUTION INTRAMUSCULAR; INTRAVENOUS EVERY 6 HOURS PRN
Status: DISCONTINUED | OUTPATIENT
Start: 2023-12-26 | End: 2023-12-27 | Stop reason: HOSPADM

## 2023-12-26 RX ORDER — CALCIUM CARBONATE 500 MG/1
1 TABLET, CHEWABLE ORAL DAILY
Status: DISCONTINUED | OUTPATIENT
Start: 2023-12-26 | End: 2023-12-27 | Stop reason: HOSPADM

## 2023-12-26 RX ORDER — LIDOCAINE HYDROCHLORIDE 10 MG/ML
0.5 INJECTION, SOLUTION INFILTRATION; PERINEURAL ONCE AS NEEDED
Status: DISCONTINUED | OUTPATIENT
Start: 2023-12-26 | End: 2023-12-26 | Stop reason: HOSPADM

## 2023-12-26 RX ORDER — DEXAMETHASONE SODIUM PHOSPHATE 4 MG/ML
INJECTION, SOLUTION INTRA-ARTICULAR; INTRALESIONAL; INTRAMUSCULAR; INTRAVENOUS; SOFT TISSUE AS NEEDED
Status: DISCONTINUED | OUTPATIENT
Start: 2023-12-26 | End: 2023-12-26 | Stop reason: SURG

## 2023-12-26 RX ORDER — FENTANYL CITRATE 50 UG/ML
50 INJECTION, SOLUTION INTRAMUSCULAR; INTRAVENOUS
Status: DISCONTINUED | OUTPATIENT
Start: 2023-12-26 | End: 2023-12-26 | Stop reason: HOSPADM

## 2023-12-26 RX ORDER — DOCUSATE SODIUM 100 MG/1
100 CAPSULE, LIQUID FILLED ORAL 2 TIMES DAILY PRN
Status: DISCONTINUED | OUTPATIENT
Start: 2023-12-26 | End: 2023-12-27 | Stop reason: HOSPADM

## 2023-12-26 RX ORDER — DOCUSATE SODIUM 250 MG
250 CAPSULE ORAL 2 TIMES DAILY PRN
Qty: 30 CAPSULE | Refills: 1 | Status: SHIPPED | OUTPATIENT
Start: 2023-12-26

## 2023-12-26 RX ORDER — IPRATROPIUM BROMIDE AND ALBUTEROL SULFATE 2.5; .5 MG/3ML; MG/3ML
3 SOLUTION RESPIRATORY (INHALATION) ONCE AS NEEDED
Status: DISCONTINUED | OUTPATIENT
Start: 2023-12-26 | End: 2023-12-26 | Stop reason: HOSPADM

## 2023-12-26 RX ORDER — ATORVASTATIN CALCIUM 20 MG/1
20 TABLET, FILM COATED ORAL DAILY
Status: DISCONTINUED | OUTPATIENT
Start: 2023-12-26 | End: 2023-12-27 | Stop reason: HOSPADM

## 2023-12-26 RX ORDER — OXYCODONE HYDROCHLORIDE AND ACETAMINOPHEN 5; 325 MG/1; MG/1
1 TABLET ORAL EVERY 4 HOURS PRN
Status: DISCONTINUED | OUTPATIENT
Start: 2023-12-26 | End: 2023-12-27 | Stop reason: HOSPADM

## 2023-12-26 RX ORDER — TRANEXAMIC ACID 100 MG/ML
INJECTION, SOLUTION INTRAVENOUS AS NEEDED
Status: DISCONTINUED | OUTPATIENT
Start: 2023-12-26 | End: 2023-12-26 | Stop reason: SURG

## 2023-12-26 RX ORDER — DOXEPIN HYDROCHLORIDE 25 MG/1
50 CAPSULE ORAL NIGHTLY
Status: DISCONTINUED | OUTPATIENT
Start: 2023-12-26 | End: 2023-12-27 | Stop reason: HOSPADM

## 2023-12-26 RX ORDER — ASPIRIN 325 MG
325 TABLET, DELAYED RELEASE (ENTERIC COATED) ORAL 2 TIMES DAILY WITH MEALS
Status: DISCONTINUED | OUTPATIENT
Start: 2023-12-27 | End: 2023-12-27 | Stop reason: HOSPADM

## 2023-12-26 RX ORDER — SODIUM CHLORIDE 0.9 % (FLUSH) 0.9 %
10 SYRINGE (ML) INJECTION EVERY 12 HOURS SCHEDULED
Status: DISCONTINUED | OUTPATIENT
Start: 2023-12-26 | End: 2023-12-27 | Stop reason: HOSPADM

## 2023-12-26 RX ORDER — DIAZEPAM 5 MG/1
5 TABLET ORAL 2 TIMES DAILY PRN
Status: DISCONTINUED | OUTPATIENT
Start: 2023-12-26 | End: 2023-12-27 | Stop reason: HOSPADM

## 2023-12-26 RX ORDER — DIPHENHYDRAMINE HYDROCHLORIDE 50 MG/ML
12.5 INJECTION INTRAMUSCULAR; INTRAVENOUS EVERY 6 HOURS PRN
Status: DISCONTINUED | OUTPATIENT
Start: 2023-12-26 | End: 2023-12-27 | Stop reason: HOSPADM

## 2023-12-26 RX ORDER — SODIUM CHLORIDE 9 MG/ML
40 INJECTION, SOLUTION INTRAVENOUS AS NEEDED
Status: DISCONTINUED | OUTPATIENT
Start: 2023-12-26 | End: 2023-12-27 | Stop reason: HOSPADM

## 2023-12-26 RX ORDER — ASPIRIN 325 MG
325 TABLET, DELAYED RELEASE (ENTERIC COATED) ORAL 2 TIMES DAILY WITH MEALS
Qty: 60 TABLET | Refills: 0 | Status: SHIPPED | OUTPATIENT
Start: 2023-12-27

## 2023-12-26 RX ORDER — NALOXONE HCL 0.4 MG/ML
0.2 VIAL (ML) INJECTION AS NEEDED
Status: DISCONTINUED | OUTPATIENT
Start: 2023-12-26 | End: 2023-12-26 | Stop reason: HOSPADM

## 2023-12-26 RX ORDER — ONDANSETRON 2 MG/ML
4 INJECTION INTRAMUSCULAR; INTRAVENOUS EVERY 6 HOURS PRN
Status: DISCONTINUED | OUTPATIENT
Start: 2023-12-26 | End: 2023-12-27 | Stop reason: HOSPADM

## 2023-12-26 RX ORDER — CELECOXIB 200 MG/1
200 CAPSULE ORAL ONCE
Status: COMPLETED | OUTPATIENT
Start: 2023-12-26 | End: 2023-12-26

## 2023-12-26 RX ORDER — DULOXETIN HYDROCHLORIDE 30 MG/1
30 CAPSULE, DELAYED RELEASE ORAL 3 TIMES DAILY
Status: DISCONTINUED | OUTPATIENT
Start: 2023-12-26 | End: 2023-12-27 | Stop reason: HOSPADM

## 2023-12-26 RX ORDER — PROMETHAZINE HYDROCHLORIDE 25 MG/1
25 TABLET ORAL ONCE AS NEEDED
Status: DISCONTINUED | OUTPATIENT
Start: 2023-12-26 | End: 2023-12-26 | Stop reason: HOSPADM

## 2023-12-26 RX ORDER — ONDANSETRON 2 MG/ML
INJECTION INTRAMUSCULAR; INTRAVENOUS AS NEEDED
Status: DISCONTINUED | OUTPATIENT
Start: 2023-12-26 | End: 2023-12-26 | Stop reason: SURG

## 2023-12-26 RX ORDER — PROPOFOL 10 MG/ML
INJECTION, EMULSION INTRAVENOUS AS NEEDED
Status: DISCONTINUED | OUTPATIENT
Start: 2023-12-26 | End: 2023-12-26 | Stop reason: SURG

## 2023-12-26 RX ORDER — SODIUM CHLORIDE 450 MG/100ML
100 INJECTION, SOLUTION INTRAVENOUS CONTINUOUS
Status: DISCONTINUED | OUTPATIENT
Start: 2023-12-26 | End: 2023-12-27 | Stop reason: HOSPADM

## 2023-12-26 RX ORDER — CLINDAMYCIN PHOSPHATE 900 MG/50ML
900 INJECTION, SOLUTION INTRAVENOUS EVERY 8 HOURS
Qty: 100 ML | Refills: 0 | Status: COMPLETED | OUTPATIENT
Start: 2023-12-26 | End: 2023-12-27

## 2023-12-26 RX ORDER — HYDRALAZINE HYDROCHLORIDE 20 MG/ML
5 INJECTION INTRAMUSCULAR; INTRAVENOUS
Status: DISCONTINUED | OUTPATIENT
Start: 2023-12-26 | End: 2023-12-26 | Stop reason: HOSPADM

## 2023-12-26 RX ORDER — LIDOCAINE HYDROCHLORIDE 20 MG/ML
INJECTION, SOLUTION EPIDURAL; INFILTRATION; INTRACAUDAL; PERINEURAL AS NEEDED
Status: DISCONTINUED | OUTPATIENT
Start: 2023-12-26 | End: 2023-12-26 | Stop reason: SURG

## 2023-12-26 RX ORDER — FERROUS SULFATE 325(65) MG
325 TABLET ORAL
Status: DISCONTINUED | OUTPATIENT
Start: 2023-12-27 | End: 2023-12-27 | Stop reason: HOSPADM

## 2023-12-26 RX ORDER — CYCLOBENZAPRINE HCL 10 MG
10 TABLET ORAL 2 TIMES DAILY PRN
Status: DISCONTINUED | OUTPATIENT
Start: 2023-12-26 | End: 2023-12-27 | Stop reason: HOSPADM

## 2023-12-26 RX ORDER — FENTANYL CITRATE 50 UG/ML
50 INJECTION, SOLUTION INTRAMUSCULAR; INTRAVENOUS ONCE AS NEEDED
Status: COMPLETED | OUTPATIENT
Start: 2023-12-26 | End: 2023-12-26

## 2023-12-26 RX ORDER — FAMOTIDINE 10 MG/ML
20 INJECTION, SOLUTION INTRAVENOUS ONCE
Status: COMPLETED | OUTPATIENT
Start: 2023-12-26 | End: 2023-12-26

## 2023-12-26 RX ORDER — LABETALOL HYDROCHLORIDE 5 MG/ML
5 INJECTION, SOLUTION INTRAVENOUS
Status: DISCONTINUED | OUTPATIENT
Start: 2023-12-26 | End: 2023-12-26 | Stop reason: HOSPADM

## 2023-12-26 RX ORDER — OXYCODONE HYDROCHLORIDE AND ACETAMINOPHEN 5; 325 MG/1; MG/1
2 TABLET ORAL EVERY 4 HOURS PRN
Status: DISCONTINUED | OUTPATIENT
Start: 2023-12-26 | End: 2023-12-27 | Stop reason: HOSPADM

## 2023-12-26 RX ORDER — CEFAZOLIN SODIUM 2 G/100ML
INJECTION, SOLUTION INTRAVENOUS AS NEEDED
Status: DISCONTINUED | OUTPATIENT
Start: 2023-12-26 | End: 2023-12-26 | Stop reason: SURG

## 2023-12-26 RX ORDER — DEXAMETHASONE SODIUM PHOSPHATE 4 MG/ML
INJECTION, SOLUTION INTRA-ARTICULAR; INTRALESIONAL; INTRAMUSCULAR; INTRAVENOUS; SOFT TISSUE
Status: COMPLETED | OUTPATIENT
Start: 2023-12-26 | End: 2023-12-26

## 2023-12-26 RX ORDER — HYDROCODONE BITARTRATE AND ACETAMINOPHEN 5; 325 MG/1; MG/1
1 TABLET ORAL ONCE AS NEEDED
Status: DISCONTINUED | OUTPATIENT
Start: 2023-12-26 | End: 2023-12-26 | Stop reason: HOSPADM

## 2023-12-26 RX ORDER — ONDANSETRON 2 MG/ML
4 INJECTION INTRAMUSCULAR; INTRAVENOUS ONCE AS NEEDED
Status: DISCONTINUED | OUTPATIENT
Start: 2023-12-26 | End: 2023-12-26 | Stop reason: HOSPADM

## 2023-12-26 RX ORDER — MORPHINE SULFATE 2 MG/ML
4 INJECTION, SOLUTION INTRAMUSCULAR; INTRAVENOUS
Status: DISCONTINUED | OUTPATIENT
Start: 2023-12-26 | End: 2023-12-27 | Stop reason: HOSPADM

## 2023-12-26 RX ORDER — GABAPENTIN 300 MG/1
900 CAPSULE ORAL EVERY MORNING
Status: DISCONTINUED | OUTPATIENT
Start: 2023-12-27 | End: 2023-12-27 | Stop reason: HOSPADM

## 2023-12-26 RX ORDER — EPHEDRINE SULFATE 50 MG/ML
INJECTION INTRAVENOUS AS NEEDED
Status: DISCONTINUED | OUTPATIENT
Start: 2023-12-26 | End: 2023-12-26 | Stop reason: SURG

## 2023-12-26 RX ORDER — LAMOTRIGINE 100 MG/1
300 TABLET ORAL NIGHTLY
Status: DISCONTINUED | OUTPATIENT
Start: 2023-12-26 | End: 2023-12-27 | Stop reason: HOSPADM

## 2023-12-26 RX ORDER — ROPIVACAINE HYDROCHLORIDE 5 MG/ML
INJECTION, SOLUTION EPIDURAL; INFILTRATION; PERINEURAL
Status: COMPLETED | OUTPATIENT
Start: 2023-12-26 | End: 2023-12-26

## 2023-12-26 RX ORDER — PROMETHAZINE HYDROCHLORIDE 12.5 MG/1
12.5 TABLET ORAL EVERY 6 HOURS PRN
Status: DISCONTINUED | OUTPATIENT
Start: 2023-12-26 | End: 2023-12-27 | Stop reason: HOSPADM

## 2023-12-26 RX ORDER — FENTANYL CITRATE 50 UG/ML
INJECTION, SOLUTION INTRAMUSCULAR; INTRAVENOUS AS NEEDED
Status: DISCONTINUED | OUTPATIENT
Start: 2023-12-26 | End: 2023-12-26 | Stop reason: SURG

## 2023-12-26 RX ORDER — NALOXONE HCL 0.4 MG/ML
0.4 VIAL (ML) INJECTION
Status: DISCONTINUED | OUTPATIENT
Start: 2023-12-26 | End: 2023-12-27 | Stop reason: HOSPADM

## 2023-12-26 RX ORDER — MIDAZOLAM HYDROCHLORIDE 1 MG/ML
0.5 INJECTION INTRAMUSCULAR; INTRAVENOUS
Status: DISCONTINUED | OUTPATIENT
Start: 2023-12-26 | End: 2023-12-26 | Stop reason: HOSPADM

## 2023-12-26 RX ORDER — DIPHENHYDRAMINE HCL 25 MG
25 CAPSULE ORAL EVERY 6 HOURS PRN
Status: DISCONTINUED | OUTPATIENT
Start: 2023-12-26 | End: 2023-12-27 | Stop reason: HOSPADM

## 2023-12-26 RX ORDER — ROCURONIUM BROMIDE 10 MG/ML
INJECTION, SOLUTION INTRAVENOUS AS NEEDED
Status: DISCONTINUED | OUTPATIENT
Start: 2023-12-26 | End: 2023-12-26 | Stop reason: SURG

## 2023-12-26 RX ADMIN — PROPOFOL 50 MG: 10 INJECTION, EMULSION INTRAVENOUS at 14:52

## 2023-12-26 RX ADMIN — ROCURONIUM BROMIDE 10 MG: 10 INJECTION, SOLUTION INTRAVENOUS at 14:51

## 2023-12-26 RX ADMIN — FENTANYL CITRATE 50 MCG: 50 INJECTION, SOLUTION INTRAMUSCULAR; INTRAVENOUS at 15:56

## 2023-12-26 RX ADMIN — CLINDAMYCIN PHOSPHATE 900 MG: 900 INJECTION, SOLUTION INTRAVENOUS at 21:26

## 2023-12-26 RX ADMIN — SUGAMMADEX 200 MG: 100 INJECTION, SOLUTION INTRAVENOUS at 15:22

## 2023-12-26 RX ADMIN — PROPOFOL 200 MG: 10 INJECTION, EMULSION INTRAVENOUS at 13:33

## 2023-12-26 RX ADMIN — Medication 10 ML: at 20:42

## 2023-12-26 RX ADMIN — ACETAMINOPHEN 1000 MG: 500 TABLET ORAL at 11:29

## 2023-12-26 RX ADMIN — ONDANSETRON 4 MG: 2 INJECTION INTRAMUSCULAR; INTRAVENOUS at 15:22

## 2023-12-26 RX ADMIN — ROPIVACAINE HYDROCHLORIDE 20 ML: 5 INJECTION EPIDURAL; INFILTRATION; PERINEURAL at 12:38

## 2023-12-26 RX ADMIN — EPHEDRINE SULFATE 10 MG: 50 INJECTION INTRAVENOUS at 14:14

## 2023-12-26 RX ADMIN — CELECOXIB 200 MG: 200 CAPSULE ORAL at 11:28

## 2023-12-26 RX ADMIN — OXYCODONE AND ACETAMINOPHEN 2 TABLET: 5; 325 TABLET ORAL at 16:17

## 2023-12-26 RX ADMIN — SODIUM CHLORIDE, POTASSIUM CHLORIDE, SODIUM LACTATE AND CALCIUM CHLORIDE: 600; 310; 30; 20 INJECTION, SOLUTION INTRAVENOUS at 15:24

## 2023-12-26 RX ADMIN — PROPOFOL 50 MG: 10 INJECTION, EMULSION INTRAVENOUS at 14:24

## 2023-12-26 RX ADMIN — MEPIVACAINE HYDROCHLORIDE 5 ML: 15 INJECTION, SOLUTION EPIDURAL; INFILTRATION at 12:38

## 2023-12-26 RX ADMIN — LAMOTRIGINE 300 MG: 100 TABLET ORAL at 21:26

## 2023-12-26 RX ADMIN — CEFAZOLIN 3000 MG: 10 INJECTION, POWDER, FOR SOLUTION INTRAVENOUS at 13:23

## 2023-12-26 RX ADMIN — ANTACID TABLETS 1 TABLET: 500 TABLET, CHEWABLE ORAL at 18:51

## 2023-12-26 RX ADMIN — CEFAZOLIN SODIUM 2 G: 2 INJECTION, SOLUTION INTRAVENOUS at 15:06

## 2023-12-26 RX ADMIN — FENTANYL CITRATE 50 MCG: 50 INJECTION, SOLUTION INTRAMUSCULAR; INTRAVENOUS at 12:35

## 2023-12-26 RX ADMIN — SODIUM CHLORIDE 100 ML/HR: 4.5 INJECTION, SOLUTION INTRAVENOUS at 18:52

## 2023-12-26 RX ADMIN — TRANEXAMIC ACID 1000 MG: 100 INJECTION INTRAVENOUS at 13:44

## 2023-12-26 RX ADMIN — DEXAMETHASONE SODIUM PHOSPHATE 4 MG: 4 INJECTION, SOLUTION INTRA-ARTICULAR; INTRALESIONAL; INTRAMUSCULAR; INTRAVENOUS; SOFT TISSUE at 12:38

## 2023-12-26 RX ADMIN — OXYCODONE AND ACETAMINOPHEN 1 TABLET: 5; 325 TABLET ORAL at 21:27

## 2023-12-26 RX ADMIN — ROCURONIUM BROMIDE 50 MG: 10 INJECTION, SOLUTION INTRAVENOUS at 13:33

## 2023-12-26 RX ADMIN — ATORVASTATIN CALCIUM 20 MG: 20 TABLET, FILM COATED ORAL at 18:51

## 2023-12-26 RX ADMIN — LIDOCAINE HYDROCHLORIDE 100 MG: 20 INJECTION, SOLUTION EPIDURAL; INFILTRATION; INTRACAUDAL; PERINEURAL at 13:33

## 2023-12-26 RX ADMIN — SODIUM CHLORIDE, POTASSIUM CHLORIDE, SODIUM LACTATE AND CALCIUM CHLORIDE 9 ML/HR: 600; 310; 30; 20 INJECTION, SOLUTION INTRAVENOUS at 11:27

## 2023-12-26 RX ADMIN — MIDAZOLAM HYDROCHLORIDE 1 MG: 1 INJECTION, SOLUTION INTRAMUSCULAR; INTRAVENOUS at 12:35

## 2023-12-26 RX ADMIN — DEXAMETHASONE SODIUM PHOSPHATE 8 MG: 4 INJECTION, SOLUTION INTRAMUSCULAR; INTRAVENOUS at 13:38

## 2023-12-26 RX ADMIN — METOPROLOL TARTRATE 12.5 MG: 25 TABLET, FILM COATED ORAL at 21:26

## 2023-12-26 RX ADMIN — FAMOTIDINE 20 MG: 10 INJECTION INTRAVENOUS at 12:21

## 2023-12-26 RX ADMIN — FENTANYL CITRATE 50 MCG: 50 INJECTION, SOLUTION INTRAMUSCULAR; INTRAVENOUS at 13:51

## 2023-12-26 RX ADMIN — DULOXETINE HYDROCHLORIDE 30 MG: 30 CAPSULE, DELAYED RELEASE ORAL at 21:26

## 2023-12-26 NOTE — OP NOTE
Orthopaedic Surgery Operative Note    Patient Name:  Armando Dixon Jr.  YOB: 1955  Age: 68 y.o.  Medical Records Number:  [id number]    Date of Procedure:  12/26/2023    Pre-operative Diagnosis:  Primary Osteoarthritis Left Knee    Post-operative Diagnosis:  Primary Osteoarthritis Left Knee    Procedure Performed:  Left Total Knee Arthroplasty    Surgical Approach: Knee Mid-Vastus    Implants:  Biomet Vanguard TKA, 67.5 Femoral Component, 75 Tibial Tray with a 40 mm Modular Stem, 31 3-Peg Patella, 12 mm Posterior Lipped Polyethylene Insert    Surgeon:  Bonifacio Dwyer M.D.    Assistant: Tamiko Jj (who was present during the critical portions of the case, thereby decreasing operative time and patient morbidity)    Anesthetic Type:  General    Estimated Blood Loss:  250cc's    Specimens: * No orders in the log *    No Complications      Indications for Procedure:  Armando Dixon Jr. is a 68 y.o. male suffering from end stage degenerative changes in the left knee.  The patients pain is becoming disabling, despite extensive conservative care, including NSAIDS, therapy and injections. The risks, benefits and alternatives were discussed and the patient wishes to proceed with left total knee arthroplasty.      Procedure Performed:    After informed consent was obtained, the correct patient identified, the correct operative side marked by the operative surgeon, and pre-operative IV Kefzol  given (prior to tourniquet inflation), the patient was taken to the operating room and placed supine on the operating table.  After general anesthesia was induced, a surgical time out was performed and 1 gm of Tranxemic Acid given, a well-padded tourniquet was placed and the patient's left lower extremity was prepped with chloraprep and draped in a sterile fashion.    A midline incision was made overlying the left knee and we sharply dissected down to expose the parapatellar retinaculum.  A muscle sparing,  mid-vastus, parapatellar arthrotomy was performed and we elevated the soft tissue both medially and laterally.  The menisci and ACL were excised.  We everted the patella and measured this to be 23 mm and we removed 8 mm of bone down to 15 mm.  We measured the patella to be 31 and drilled our three drill holes.  We protected the patella with the patella protector and turned our attention to the femur and the tibia.    We drilled drill holes into the femoral and the tibial intramedullary canals and proceeded to irrigate the canals to remove the fatty marrow.  We used the intramedullary margaux and the 5 degree valgus cut block to make our distal femoral cut.  Once we had a smooth surface, we measured the femur to be 67.5.  We assured we had rotational alignment using the epicondylar axis, then we used the four-in-one cut block to make our anterior, posterior, anterior and posterior chamfer cuts.  We placed our femoral trial, had excellent fit, extended the knee and marked Anali's line on the tibia.      We then turned our attention to the tibia, where we irrigated the canal again.  We used the intramedullary margaux and the 3 degree posterior sloped cut block to remove 2 mm of bone from the effected side of the tibia.  Prior to making our cut, we assured we had rotational alignment using the external guide and Datto's line.  Once we had a smooth surface, we measured the tibia to be 75.  We then removed soft tissue and bony debris from the posterior aspect of the knee.    We placed our trial implants and had excellent fit, range of motion, stability and ligament balance, throughout a complete arc of motion with a 12 lipped polyethylene liner.  We removed our trial implants, punched the tibial keel, copiously irrigated the knee, then cemented our implants in place using Biomet cement.  Once the cement cured, we trialed again with the 10, 12 and 14 AS, standard and posterior lipped polyethylene liners.  The 12 lipped  "gave us the best range of motion, stability and ligament balance, throughout a complete arc of motion.  We removed the trials, copiously irrigated the knee with dilute betadine solution, gave IV antibiotics and local anesthetic, then placed our permanent polyethylene liner.  We placed a 1/8\" hemovac drain, then closed the arthrotomy with #1 Vicryl pop-off sutures.  The subcutaneous tissue was closed with 2-0 vicryl pop-off sutures.  The skin was closed with staples.  We placed a sterile dressing of Xeroform, 4x4's, abdominal pads, cast padding and an Ace Wrap.  All sponge and needle counts were correct.  The patient was then awakened from general anesthesia and taken to the recovery room in stable condition.    The patient will be started on anticoagulation for DVT prophylaxis.  IV antibiotics will be discontinued within 24 hours of surgery.  Immediately prior to surgery, there were no acute Thromboembolic nor Cardiovascular risk factors.  An updated Medical Reconciliation form is on the chart.    Bonifacio Slater PA-C  [facility]  [address] [place] [id number] [phone]    12/26/2023      "

## 2023-12-26 NOTE — ANESTHESIA PROCEDURE NOTES
Airway  Urgency: elective    Date/Time: 12/26/2023 1:36 PM  Airway not difficult    General Information and Staff    Patient location during procedure: OR  CRNA/CAA: Shanelle Cruz CRNA    Indications and Patient Condition  Indications for airway management: airway protection    Preoxygenated: yes  Mask difficulty assessment: 1 - vent by mask    Final Airway Details  Final airway type: endotracheal airway      Successful airway: ETT  Cuffed: yes   Successful intubation technique: direct laryngoscopy  Facilitating devices/methods: intubating stylet  Endotracheal tube insertion site: oral  Blade: Kendy  Blade size: 4  ETT size (mm): 7.5  Cormack-Lehane Classification: grade IIa - partial view of glottis  Placement verified by: chest auscultation and capnometry   Cuff volume (mL): 8  Measured from: lips  Number of attempts at approach: 1  Assessment: lips, teeth, and gum same as pre-op and atraumatic intubation

## 2023-12-26 NOTE — ANESTHESIA POSTPROCEDURE EVALUATION
Patient: Armando Dixon Jr.    Procedure Summary       Date: 12/26/23 Room / Location:  [facility] [address] [place] [address] [facility]    Anesthesia Start: 1328 Anesthesia Stop: 1539    Procedure: TOTAL KNEE ARTHROPLASTY (Left: Knee) Diagnosis:     Surgeons: Bonifacio Dwyer MD Provider: Elma Sawyer MD    Anesthesia Type: general with block ASA Status: 3            Anesthesia Type: general with block    Vitals  Vitals Value Taken Time   /69 12/26/23 1615   Temp 36.6 °C (97.9 °F) 12/26/23 1536   Pulse 71 12/26/23 1623   Resp 16 12/26/23 1540   SpO2 95 % 12/26/23 1623   Vitals shown include unfiled device data.        Post Anesthesia Care and Evaluation    Patient location during evaluation: bedside  Patient participation: complete - patient participated  Level of consciousness: awake  Pain management: adequate    Airway patency: patent  Anesthetic complications: No anesthetic complications    Cardiovascular status: acceptable  Respiratory status: acceptable  Hydration status: acceptable    Comments: /59   Pulse 75   Temp 36.6 °C (97.9 °F) (Oral)   Resp 16   SpO2 94%

## 2023-12-26 NOTE — ANESTHESIA PROCEDURE NOTES
Peripheral Block    Pre-sedation assessment completed: 12/26/2023 12:30 PM    Patient reassessed immediately prior to procedure    Patient location during procedure: holding area  Start time: 12/26/2023 12:38 PM  Stop time: 12/26/2023 12:42 PM  Reason for block: at surgeon's request and post-op pain management  Performed by  Anesthesiologist: Elma Sawyer MD  Preanesthetic Checklist  Completed: patient identified, IV checked, site marked, risks and benefits discussed, surgical consent, monitors and equipment checked, pre-op evaluation and timeout performed  Prep:  Pt Position: sitting  Sterile barriers:cap, gloves, mask and sterile barriers  Prep: ChloraPrep  Patient monitoring: blood pressure monitoring, continuous pulse oximetry and EKG  Procedure    Sedation: yes  Performed under: PNB  Guidance:ultrasound guided    ULTRASOUND INTERPRETATION.  Using ultrasound guidance a 22 G gauge needle was placed in close proximity to the femoral nerve, at which point, under ultrasound guidance anesthetic was injected in the area of the nerve and spread of the anesthesia was seen on ultrasound in close proximity thereto.  There were no abnormalities seen on ultrasound; a digital image was taken; and the patient tolerated the procedure with no complications. Images:still images obtained    Laterality:left  Block Type:adductor canal block  Injection Technique:single-shot  Needle Type:echogenic  Needle Gauge:20 G  Resistance on Injection: none    Medications Used: dexamethasone (DECADRON) injection - Injection   4 mg - 12/26/2023 12:38:00 PM  ropivacaine (NAROPIN) 0.5 % injection - Injection   20 mL - 12/26/2023 12:38:00 PM  Mepivacaine HCl (PF) (CARBOCAINE) 1.5 % injection - Injection   5 mL - 12/26/2023 12:38:00 PM      Medications  Comment:U/S guidance used for needle placement & medication dispersement.    Photo placed in chart.      Post Assessment  Injection Assessment: negative aspiration for heme, no  paresthesia on injection and incremental injection  Patient Tolerance:comfortable throughout block  Complications:no

## 2023-12-26 NOTE — H&P
Orthopaedic Surgery History and Physical    Patient Name:  Armando Dixon Jr.  YOB: 1955   Age: 68 y.o.  Medical Records Number:  [id number]    Date of Admission:  12/26/2023 10:38 AM    Chief Complaint:  No admission diagnoses are documented for this encounter.    Armando Dixon Jr. is a 68 y.o. male who presents c/o severe left knee pain.  The pain has been on and off for many years, worsening to the point where the pain is becoming disabling.  The pain is a constant dull ache with occasional sharp, stabbing pain.  The patient has failed conservative treatment and would like to proceed with left total knee arthroplasty.    /80 (BP Location: Left arm, Patient Position: Sitting)   Pulse 69   Temp 98.2 °F (36.8 °C) (Oral)   Resp 16   SpO2 96%     Past Medical History:    Past Medical History:   Diagnosis Date    Allergic     Anxiety     Arthritis     Asthma     CHILDOOD    Coronary artery disease     STENT X 2    DDD (degenerative disc disease), cervical     LUMBAR, THORACIC, SACRAL    Deep vein thrombosis     Depression     Esophagitis with gastritis     GERD (gastroesophageal reflux disease)     History of kidney stones     Hyperlipidemia     Hypertension     Incisional hernia     Left knee pain     Numbness     LEFT LATERAL THIGH S/P HIP ARTHROPLASTY/MEDIAL PAIN    ENRRIQUE on CPAP     Osteoarthritis of left knee     Osteoporosis     Prediabetes     Prostatitis     Pulmonary embolism     Urgency incontinence        Past Surgical History:   Past Surgical History:   Procedure Laterality Date    CARDIAC CATHETERIZATION      CARDIAC CATHETERIZATION N/A 02/28/2020    Procedure: Left Heart Cath;  Surgeon: Minerva Adams MD;  Location:  [facility];  Service: Cardiology;  Laterality: N/A;    CARDIAC CATHETERIZATION N/A 02/28/2020    Procedure: Coronary angiography;  Surgeon: Minerva Adams MD;  Location:  [facility];  Service: Cardiology;   Laterality: N/A;    CARDIAC CATHETERIZATION N/A 02/28/2020    Procedure: Left ventriculography;  Surgeon: Minerva Adams MD;  [facility];  Service: Cardiology;  Laterality: N/A;    CARDIAC CATHETERIZATION N/A 02/28/2020    Procedure: Percutaneous Coronary Intervention;  Surgeon: Minerva Adams MD;  [facility];  Service: Cardiology;  Laterality: N/A;    CARDIAC CATHETERIZATION N/A 02/28/2020    Procedure: Stent FADY coronary;  Surgeon: Minerva Adams MD;  [facility];  Service: Cardiology;  Laterality: N/A;    CARPAL TUNNEL RELEASE Bilateral     CHOLECYSTECTOMY      COLONOSCOPY  10/06/2014    [facility]    ENDOSCOPY  08/29/2014    Severe esophagitis, gastritis, neg for H pylori, Walters's, and fungi    GASTRIC BYPASS      INCISIONAL HERNIA REPAIR      TOTAL HIP ARTHROPLASTY Bilateral     TOTAL KNEE ARTHROPLASTY Right 6/6/2023    Procedure: TOTAL KNEE ARTHROPLASTY;  Surgeon: Bonifacio Dwyer MD;  [facility];  Service: Orthopedics;  Laterality: Right;    VENA CAVA FILTER INSERTION         Social History:    Social History     Socioeconomic History    Marital status:    Tobacco Use    Smoking status: Never    Smokeless tobacco: Never   Vaping Use    Vaping Use: Never used   Substance and Sexual Activity    Alcohol use: Yes     Comment: RARELY    Drug use: No    Sexual activity: Defer       Family History:    Family History   Problem Relation Age of Onset    Hypertension Mother     Arrhythmia Mother     Hyperlipidemia Neg Hx     Heart disease Neg Hx     Heart failure Neg Hx     Heart attack Neg Hx     Malig Hyperthermia Neg Hx        Current Medications:  Scheduled Meds:ceFAZolin, 3,000 mg, Intravenous, Once  famotidine, 20 mg, Intravenous, Once  ropivacaine 0.5 % 50 mL, Morphine 5 mg, ketorolac 30 mg, EPINEPHrine 1 mg/mL 0.3 mg in sodium chloride 0.9 % 50 mL solution, , Injection, Once  sodium  chloride, 3 mL, Intravenous, Q12H      Continuous Infusions:lactated ringers, 9 mL/hr, Last Rate: 9 mL/hr (12/26/23 1127)      PRN Meds:.  fentanyl    lidocaine    midazolam    sodium chloride    Current Facility-Administered Medications:     ceFAZolin in Sodium Chloride (ANCEF) IVPB solution 3,000 mg, 3,000 mg, Intravenous, Once, Bonifacio Dwyer MD    famotidine (PEPCID) injection 20 mg, 20 mg, Intravenous, Once, Elma Sawyer MD    fentaNYL citrate (PF) (SUBLIMAZE) injection 50 mcg, 50 mcg, Intravenous, Once PRN, Elma Sawyer MD    lactated ringers infusion, 9 mL/hr, Intravenous, Continuous, Elma Sawyer MD, Last Rate: 9 mL/hr at 12/26/23 1127, 9 mL/hr at 12/26/23 1127    lidocaine (XYLOCAINE) 1 % injection 0.5 mL, 0.5 mL, Intradermal, Once PRN, Elma Sawyer MD    midazolam (VERSED) injection 0.5 mg, 0.5 mg, Intravenous, Q5 Min PRN, Elma Sawyer MD    ropivacaine 0.5 % 50 mL, Morphine 5 mg, ketorolac 30 mg, EPINEPHrine 1 mg/mL 0.3 mg in sodium chloride 0.9 % 50 mL solution, , Injection, Once, Boniafcio Dwyer MD    sodium chloride 0.9 % flush 3 mL, 3 mL, Intravenous, Q12H, Elma Sawyer MD    sodium chloride 0.9 % flush 3-10 mL, 3-10 mL, Intravenous, PRN, Elma Sawyer MD    Allergies:  No Known Allergies    Review of Systems:   HEENT: Patient denies any headaches, vision changes, change in hearing, or tinnitus, Patient denies any rhinorrhea,epistaxis, sinus pain, mouth or dental problems, sore throat or hoarseness, or dysphagia  Pulmonary: Patient denies any cough, congestion, SOA, or wheezing  Cardiovascular: Patient denies any chest pain, dyspnea, palpitations, weakness, intolerance of exercise, varicosities, swelling of extremities, known murmur  Gastrointestinal:  Patient denies nausea, vomiting, diarrhea, constipation, loss  of appetite, change in appetite, dysphagia, gas, heartburn, melena, change in  bowel habits, use of laxatives or other drugs to alter the function of the gastrointestinal tract.  Genital/Urinary: Patient denies dysuria, change in color of urine, change in frequency of urination, pain with urgency, incontinence, retention, or nocturia.  Musculoskeletal: Patient denies increased warmth; redness; or swelling of joints; limitation of function; deformity; crepitation: pain in a joint or an extremity, the neck, or the back, especially with movement.  Neurological: Patient denies dizziness, tremor, ataxia, difficulty in speaking, change in speech, paresthesia, loss of sensation, seizures, syncope, changes in memory.  Endocrine system: Patient denies tremors, palpitations, intolerance of heat or cold, polyuria, polydipsia, polyphagia, diaphoresis, exophthalmos, or goiter.  Psychological: Patient denies thoughts/plans or harming self or other; depression,  insomnia, night terrors, azul, memory loss, disorientation.  Skin: Patient denies any bruising, rashes, discoloration, pruritus, wounds, ulcers, decubiti, changes in the hair or nails  Hematopoietic: Patient denies history of spontaneous or excessive bleeding, epistaxis, hematuria, melena, fatigue, enlarged or tender lymph nodes, pallor, history of anemia.        Physical Exam:   Awake, A&O x3, affect normal, no acute distress  Ambulating with a limp due to knee pain  Knee ROM is limited due to pain (5-115)  Strength is 4/5 in the quad, hamstring and calf  Cap refill is normal, Sensation intact    Card:  RR, HD Stable  Pulm:  Regular breathing, no S.O.A  Abd:  Soft, NT, ND    Lab Results (last 24 hours)       ** No results found for the last 24 hours. **            XR Chest PA & Lateral, XR Knee 1 or 2 View Left    Result Date: 12/13/2023  Narrative: XR CHEST PA AND LATERAL-, XR KNEE 1 OR 2 VW LEFT-  HISTORY: 68-year-old male with history of left knee pain. Preoperative evaluation.  FINDINGS:  CHEST: There is no evidence for pneumonia, effusions, or  CHF.  LEFT KNEE: There are severe osteoarthritic changes, most severe at the medial tibiofemoral compartment. There is no joint effusion or acute abnormality.  This report was finalized on 12/13/2023 1:58 PM by Dr. Randi Patel M.D on Workstation: [id number]         Assessment:  End-stage Primary Left Knee Osteoarthritis    Plan:  Patient's pain is becoming disabling, despite extensive conservative treatment.  Radiographs reveal end-stage degenerative changes.  The risks of surgery, including, but not limited to, heart attack, stroke, dying, DVT, nerve injury, vascular injury, arthrofibrosis and infection were discussed.  The alternatives and benefits were also discussed.  All questions answered and the patient wishes to proceed with left total knee arthroplasty.    Bonifacio Slater PA-C  [facility]  [address] [place] [address]  [phone]    12/26/2023    CC: Bonifacio Dodd MD, Bonifacio Dwyer MD

## 2023-12-26 NOTE — DISCHARGE SUMMARY
"  Orthopaedic Surgery Discharge Summary    Patient Name:  Armando Dixon Jr.  YOB: 1955  Age: 68 y.o.  Medical Records Number:  [id number]    Date of Admission:  12/26/2023  Date of Discharge:  12/27/2023    Primary Discharge Diagnosis:  Primary osteoarthritis of left knee [M17.12]    Secondary Discharge Diagnosis:    Problems Addressed this Visit          Musculoskeletal and Injuries    * (Principal) Primary osteoarthritis of left knee - Primary    Relevant Medications    oxyCODONE-acetaminophen (PERCOCET) 5-325 MG per tablet     Diagnoses         Codes Comments    Primary osteoarthritis of left knee    -  Primary ICD-10-CM: M17.12  ICD-9-CM: 715.16             Procedures Performed:  Left Total Knee Arthroplasty      Hospital Course:    Armando Dixon Jr. is a 68 y.o.  male who underwent successful left tka on 12/26/2023.  Armando Dixon Jr. was started on Aspirin 325 mg po twice daily post-operatively for DVT prophylaxis.  On post-op day 1 the patients dressing was changed and their incision was clean, with no signs of infection and their calf was soft, with no signs of DVT.  The patient progressed well with physical therapy and the patients hemoglobin remained stable. On post-operative day 1 the patient was felt ready for discharge.     Vitals:  Vitals:    12/26/23 1643 12/26/23 1645 12/26/23 1700 12/26/23 1734   BP: 153/77 153/77 148/81 156/74   BP Location:    Left arm   Patient Position:    Lying   Pulse: 76 81 71 85   Resp: 16 16 16 16   Temp: 98.5 °F (36.9 °C)   98.4 °F (36.9 °C)   TempSrc: Oral   Oral   SpO2: 96% 97% 97% 97%   Weight:    130 kg (285 lb 9.6 oz)   Height:    165.1 cm (65\")       Discharge Medications:      Discharge Medications        New Medications        Instructions Start Date   aspirin 325 MG EC tablet   325 mg, Oral, 2 Times Daily With Meals   Start Date: December 27, 2023     docusate sodium 250 MG capsule  Commonly known as: COLACE   250 mg, Oral, 2 Times Daily " PRN      oxyCODONE-acetaminophen 5-325 MG per tablet  Commonly known as: PERCOCET   1-2 tablets, Oral, Every 4 Hours PRN             Continue These Medications        Instructions Start Date   acetaminophen 500 MG tablet  Commonly known as: TYLENOL   500 mg, Oral, Every 6 Hours PRN      ALBUTEROL IN   Inhalation, As Needed      alfuzosin 10 MG 24 hr tablet  Commonly known as: UROXATRAL   10 mg, Oral, Daily      atorvastatin 20 MG tablet  Commonly known as: LIPITOR   20 mg, Oral, Daily      calcium carbonate 500 MG chewable tablet  Commonly known as: TUMS   1 tablet, Oral, Daily      cyclobenzaprine 10 MG tablet  Commonly known as: FLEXERIL   10 mg, Oral, 2 Times Daily PRN      doxepin 25 MG capsule  Commonly known as: SINEquan   50 mg, Oral, Nightly      DULoxetine HCl 30 MG capsule  Commonly known as: DRIZALMA   Oral, Daily      DULoxetine 30 MG capsule  Commonly known as: CYMBALTA   Take 1 capsule by mouth 3 times a day.      gabapentin 300 MG capsule  Commonly known as: NEURONTIN   900 mg, Every Morning,  CAPSULES AT BEDTIME      lamoTRIgine 150 MG tablet  Commonly known as: LaMICtal   300 mg, Oral, Nightly      metoprolol tartrate 25 MG tablet  Commonly known as: LOPRESSOR   TAKE 1/2 TABLET BY MOUTH TWICE A DAY      montelukast 10 MG tablet  Commonly known as: SINGULAIR   10 mg, Oral, Nightly      nitroglycerin 0.4 MG SL tablet  Commonly known as: NITROSTAT   1 under the tongue as needed for angina, may repeat q5mins for up three doses      omeprazole 40 MG capsule  Commonly known as: priLOSEC   40 mg, Oral, 2 Times Daily      triamterene-hydrochlorothiazide 37.5-25 MG per capsule  Commonly known as: DYAZIDE   1 capsule, Oral, Every Morning      Vyvanse 50 MG capsule  Generic drug: lisdexamfetamine   50 mg, Oral, Every Morning             Stop These Medications      diclofenac 75 MG EC tablet  Commonly known as: VOLTAREN              Pain Medications:  Percocet 5/325 mg 1-2 po q 4-6 hours prn pain    DVT  Prophylaxis:  Enteric Coated Aspirin 325 mg po twice daily for 2 weeks, then one daily for 4 weeks      Total Knee Joint Replacement Discharge Instructions:    I. ACTIVITIES:  1. Exercises:  Complete exercise program as taught by the hospital physical therapist 2 times per day  Exercise program will be advanced by the physical therapist  During the day be up ambulating every 2 hours (while awake) for short distances  Complete the ankle pump exercises at least 10 times per hour (while awake)  Elevate legs most of the day the first week post operatively and thereafter elevate legs when in bed and for at least 30 minutes during the day. Caution must be taken to avoid pillow placement under the bend of the knee as this can led to flexion contractures of the knee.  Use cold packs 20-30 minutes approximately 5 times per day. This should be done before and after completing your exercises and at any time you are experiencing pain/ stiffness in your operative extremity.      2. Activities of Daily Living:  No tub baths, hot tubs, or swimming pools for 4 weeks  May shower and let water run over the incision on post-operative day #5 if no drainage. Do not scrub or rub the incision. Simply let the water run over the incision and pat dry.    II. Restrictions  Do not cross legs or kneel  Your surgeon will discuss with you when you will be able to drive again.  Weight bearing is as tolerated  First week stay inside on even terrain. May go up and down stairs one stair at a time utilizing the hand rail  After one week, you may venture outside.    III. Precautions:  Everyone that comes near you should wash their hands  No elective dental, genital-urinary, or colon procedures or surgical procedures for 12 weeks after surgery unless absolutely necessary.   If dental work or surgical procedure is deemed absolutely necessary, you will need to contact your surgeon as you will need to take antibiotics 1 hour prior to any dental work  (including teeth cleanings).  Please discuss with your surgeon prophylactic antibiotics as the length of time this intervention will be necessary for you varies with each patient’s health history and situation.  Avoid sick people. If you must be around someone who is ill, they should wear a mask.  Avoid visits to the Emergency Room or Urgent Care unless you are having a life threatening event.   If ordered stockings are to be placed on in the morning and removed at night. Monitor the stockings to ensure that any swelling is not causing the stockings to become too tight. In this case, remove stockings immediately.    IV. INCISION CARE:  Wash your hands prior to dressing changes  Change the dressing as needed to keep incision clean and dry. Utilize dry gauze and paper tape. Avoid touching the side of the gauze that goes against the incision with your hands.  No creams or ointments to the incision  May remove dressing once the incision is free of drainage  Do not touch or pick at the incision  Check incision every day and notify surgeon immediately if any of the following signs or symptoms are noted:  Increase in redness  Increase in swelling around the incision and of the entire extremity  Increase in pain  Drainage oozing from the incision  Pulling apart of the edges of the incision  Increase in overall body temperature (greater than 100.5 degrees)  Your surgeon will instruct you regarding suture or staple removal    V. Medications:   1. Anticoagulants: You will be discharged on an anticoagulant. This is a prophylactic medication that helps prevent blood clots during your post-operative period. The type and length of dosage varies based on your individual needs, procedure performed, and surgeon’s preference.  While taking the anticoagulant, you should avoid taking any additional aspirin, ibuprofen (Advil or Motrin), Aleve (Naprosyn) or other non-steroidal anti-inflammatory medications.   Notify surgeon immediately if  any marleni bleeding is noted in the urine, stool, emesis, or from the nose or the incision. Blood in the stool will often appear as black rather than red. Blood in urine may appear as pink. Blood in emesis may appear as brown/black like coffee grounds.  You will need to apply pressure for longer periods of time to any cuts or abrasions to stop bleeding  Avoid alcohol while taking anticoagulants    2. Stool Softeners: You will be at greater risk of constipation after surgery due to being less mobile and the pain medications.   Take stool softeners as instructed by your surgeon while on pain medications. Over the counter Colace 100 mg 1-2 capsules twice daily.   If stools become too loose or too frequent, please decreases the dosage or stop the stool softener.  If constipation occurs despite use of stool softeners, you are to continue the stool softeners and add a laxative (Milk of Magnesia 1 ounce daily as needed)  Drink plenty of fluids, and eat fruits and vegetables during your recovery time    3. Pain Medications utilized after surgery are narcotics and the law requires that the following information be given to all patients that are prescribed narcotics:  CLASSIFICATION: Pain medications are called Opioids and are narcotics  LEGALITIES: It is illegal to share narcotics with others and to drive within 24 hours of taking narcotics  POTENTIAL SIDE EFFECTS: Potential side effects of opioids include: nausea, vomiting, itching, dizziness, drowsiness, dry mouth, constipation, and difficulty urinating.  POTENTIAL ADVERSE EFFECTS:   Opioid tolerance can develop with use of pain medications and this simply means that it requires more and more of the medication to control pain; however, this is seen more in patients that use opioids for longer periods of time.  Opioid dependence can develop with use of Opioids and this simply means that to stop the medication can cause withdrawal symptoms; however, this is seen with patients  that use Opioids for longer periods of time.  Opioid addiction can develop with use of Opioids and the incidence of this is very unlikely in patients who take the medications as ordered and stop the medications as instructed.  Opioid overdose can be dangerous, but is unlikely when the medication is taken as ordered and stopped when ordered. It is important not to mix opioids with alcohol or with and type of sedative such as Benadryl as this can lead to over sedation and respiratory difficulty.  DOSAGE:   Pain medications will need to be taken consistently for the first week to decrease pain and promote adequate pain relief and participation in physical therapy.  After the initial surgical pain begins to resolve, you may begin to decrease the pain medication. By the end of 6-8 weeks, you should be off of pain medications.  Refills will not be given by the office during evening hours, on weekends, or after 6-8 weeks post-op.  To seek refills on pain medications during the initial 6 week post-operative period, you must call the office 48 hours in advance to request the refill. The office will then notify you when to  the prescription. DO NOT wait until you are out of the medication to request a refill.    V. FOLLOW-UP VISITS:  You will need to follow up in the office with your surgeon in 3 weeks. Please call this number [phone] to schedule this appointment.  If you have any concerns or suspected complications prior to your follow up visit, please call your surgeons office. Do not wait until your appointment time if you suspect complications. These will need to be addressed in the office promptly.    Bonifacio Slater PA-C  [facility]  [address] [place], [place] [address]  [phone]    12/26/2023    CC:Bonifacio Dodd MD:Bonifacio Dwyer MD

## 2023-12-26 NOTE — ANESTHESIA PREPROCEDURE EVALUATION
Anesthesia Evaluation     Patient summary reviewed and Nursing notes reviewed   history of anesthetic complications:   NPO Solid Status: > 8 hours  NPO Liquid Status: > 2 hours           Airway   Mallampati: II  Neck ROM: full  Large neck circumference  Dental - normal exam     Pulmonary    (+) pulmonary embolism, asthma,sleep apnea  Cardiovascular     ECG reviewed  PT is on anticoagulation therapy    (+) hypertension, CAD, cardiac stents more than 12 months ago , DVT, hyperlipidemia    ROS comment: Off anticoagulation x 2 weeks.    Sinus rhythm  Short WA interval  Borderline low voltage, extremity leads  No change from previous tracing      5/23:  Findings consistent with a normal ECG stress test.  ·  Left ventricular ejection fraction is normal (Calculated EF = 60%).  ·  Myocardial perfusion imaging indicates a normal myocardial perfusion study with no evidence of ischemia.  ·  Impressions are consistent with a low risk study.    Left ventricular ejection fraction appears to be 66 - 70%.  ·  Left ventricular diastolic function was normal.        Neuro/Psych  (+) numbness, psychiatric history  GI/Hepatic/Renal/Endo    (+) obesity, morbid obesity, GERD, renal disease- stones    Musculoskeletal     Abdominal   (+) obese   Substance History - negative use     OB/GYN negative ob/gyn ROS         Other   arthritis,                   Anesthesia Plan    ASA 3     general with block     (    I have reviewed the patient's history with the patient and the chart, including all pertinent laboratory results and imaging. I have explained the risks of anesthesia including but not limited to dental damage, corneal abrasion, nerve injury, MI, stroke, and death.    )  intravenous induction     Anesthetic plan, risks, benefits, and alternatives have been provided, discussed and informed consent has been obtained with: patient.    CODE STATUS:

## 2023-12-27 VITALS
HEIGHT: 65 IN | RESPIRATION RATE: 16 BRPM | OXYGEN SATURATION: 95 % | TEMPERATURE: 97.5 F | DIASTOLIC BLOOD PRESSURE: 72 MMHG | WEIGHT: 285.6 LBS | SYSTOLIC BLOOD PRESSURE: 126 MMHG | BODY MASS INDEX: 47.58 KG/M2 | HEART RATE: 62 BPM

## 2023-12-27 LAB
ANION GAP SERPL CALCULATED.3IONS-SCNC: 10 MMOL/L (ref 5–15)
BUN SERPL-MCNC: 20 MG/DL (ref 8–23)
BUN/CREAT SERPL: 18.7 (ref 7–25)
CALCIUM SPEC-SCNC: 8.6 MG/DL (ref 8.6–10.5)
CHLORIDE SERPL-SCNC: 101 MMOL/L (ref 98–107)
CO2 SERPL-SCNC: 24 MMOL/L (ref 22–29)
CREAT SERPL-MCNC: 1.07 MG/DL (ref 0.76–1.27)
DEPRECATED RDW RBC AUTO: 42.3 FL (ref 37–54)
EGFRCR SERPLBLD CKD-EPI 2021: 76.1 ML/MIN/1.73
ERYTHROCYTE [DISTWIDTH] IN BLOOD BY AUTOMATED COUNT: 13.1 % (ref 12.3–15.4)
GLUCOSE SERPL-MCNC: 178 MG/DL (ref 65–99)
HCT VFR BLD AUTO: 36.9 % (ref 37.5–51)
HGB BLD-MCNC: 12.2 G/DL (ref 13–17.7)
MCH RBC QN AUTO: 29.1 PG (ref 26.6–33)
MCHC RBC AUTO-ENTMCNC: 33.1 G/DL (ref 31.5–35.7)
MCV RBC AUTO: 88.1 FL (ref 79–97)
PLATELET # BLD AUTO: 232 10*3/MM3 (ref 140–450)
PMV BLD AUTO: 9.4 FL (ref 6–12)
POTASSIUM SERPL-SCNC: 4.1 MMOL/L (ref 3.5–5.2)
RBC # BLD AUTO: 4.19 10*6/MM3 (ref 4.14–5.8)
SODIUM SERPL-SCNC: 135 MMOL/L (ref 136–145)
WBC NRBC COR # BLD AUTO: 10.56 10*3/MM3 (ref 3.4–10.8)

## 2023-12-27 PROCEDURE — 85027 COMPLETE CBC AUTOMATED: CPT | Performed by: ORTHOPAEDIC SURGERY

## 2023-12-27 PROCEDURE — 80048 BASIC METABOLIC PNL TOTAL CA: CPT | Performed by: ORTHOPAEDIC SURGERY

## 2023-12-27 PROCEDURE — G0378 HOSPITAL OBSERVATION PER HR: HCPCS

## 2023-12-27 PROCEDURE — 25010000002 CLINDAMYCIN 900 MG/50ML SOLUTION: Performed by: ORTHOPAEDIC SURGERY

## 2023-12-27 PROCEDURE — 97110 THERAPEUTIC EXERCISES: CPT

## 2023-12-27 PROCEDURE — 97161 PT EVAL LOW COMPLEX 20 MIN: CPT

## 2023-12-27 RX ADMIN — ATORVASTATIN CALCIUM 20 MG: 20 TABLET, FILM COATED ORAL at 08:23

## 2023-12-27 RX ADMIN — CLINDAMYCIN PHOSPHATE 900 MG: 900 INJECTION, SOLUTION INTRAVENOUS at 05:15

## 2023-12-27 RX ADMIN — DULOXETINE HYDROCHLORIDE 30 MG: 30 CAPSULE, DELAYED RELEASE ORAL at 08:23

## 2023-12-27 RX ADMIN — FERROUS SULFATE TAB 325 MG (65 MG ELEMENTAL FE) 325 MG: 325 (65 FE) TAB at 08:23

## 2023-12-27 RX ADMIN — OXYCODONE AND ACETAMINOPHEN 2 TABLET: 5; 325 TABLET ORAL at 08:23

## 2023-12-27 RX ADMIN — GABAPENTIN 900 MG: 300 CAPSULE ORAL at 06:58

## 2023-12-27 RX ADMIN — ANTACID TABLETS 1 TABLET: 500 TABLET, CHEWABLE ORAL at 08:24

## 2023-12-27 RX ADMIN — OXYCODONE AND ACETAMINOPHEN 1 TABLET: 5; 325 TABLET ORAL at 03:57

## 2023-12-27 RX ADMIN — METOPROLOL TARTRATE 12.5 MG: 25 TABLET, FILM COATED ORAL at 08:24

## 2023-12-27 RX ADMIN — Medication 10 ML: at 08:23

## 2023-12-27 RX ADMIN — ASPIRIN 325 MG: 325 TABLET, COATED ORAL at 08:23

## 2023-12-27 NOTE — PLAN OF CARE
Goal Outcome Evaluation:         Patient is POD #0 for a left total knee arthroplasty. Patient is alert x4.  Dressing is dry, and intact.  Hemovac is intact.  Ice applied to the affected extremity.  Morphine, and percocet were ordered for pain management.  Voiding function is intact.  Patient was able to ambulate from the stretcher to the bed.  No signs of distress noted.  Will continue to monitor and update accordingly.

## 2023-12-27 NOTE — THERAPY EVALUATION
Patient Name: Armando Dixon Jr.  : 1955    MRN: [id number]                              Today's Date: 2023       Admit Date: 2023    Visit Dx:     ICD-10-CM ICD-9-CM   1. Primary osteoarthritis of left knee  M17.12 715.16     Patient Active Problem List   Diagnosis    Essential hypertension    Class 1 obesity due to excess calories without serious comorbidity with body mass index (BMI) of 30.0 to 30.9 in adult    Mixed hyperlipidemia    Abnormal cardiac function test    Coronary artery disease involving native coronary artery of native heart without angina pectoris    Abnormal electrocardiogram    Preoperative examination, unspecified    Primary osteoarthritis of right knee    Primary osteoarthritis of left knee     Past Medical History:   Diagnosis Date    Allergic     Anxiety     Arthritis     Asthma     CHILDOOD    Coronary artery disease     STENT X 2    DDD (degenerative disc disease), cervical     LUMBAR, THORACIC, SACRAL    Deep vein thrombosis     Depression     Esophagitis with gastritis     GERD (gastroesophageal reflux disease)     History of kidney stones     Hyperlipidemia     Hypertension     Incisional hernia     Left knee pain     Numbness     LEFT LATERAL THIGH S/P HIP ARTHROPLASTY/MEDIAL PAIN    ENRRIQUE on CPAP     Osteoarthritis of left knee     Osteoporosis     Prediabetes     Prostatitis     Pulmonary embolism     Urgency incontinence      Past Surgical History:   Procedure Laterality Date    CARDIAC CATHETERIZATION      CARDIAC CATHETERIZATION N/A 2020    Procedure: Left Heart Cath;  Surgeon: Minerva Adams MD;  Location:  [facility];  Service: Cardiology;  Laterality: N/A;    CARDIAC CATHETERIZATION N/A 2020    Procedure: Coronary angiography;  Surgeon: Minerva Adams MD;  Location:  [facility];  Service: Cardiology;  Laterality: N/A;    CARDIAC CATHETERIZATION N/A 2020    Procedure: Left ventriculography;   Surgeon: Minerva Adams MD;  Location:  [facility];  Service: Cardiology;  Laterality: N/A;    CARDIAC CATHETERIZATION N/A 02/28/2020    Procedure: Percutaneous Coronary Intervention;  Surgeon: Minerva Adams MD;  Location:  [facility];  Service: Cardiology;  Laterality: N/A;    CARDIAC CATHETERIZATION N/A 02/28/2020    Procedure: Stent FADY coronary;  Surgeon: Minerva Adams MD;  Location:  [facility];  Service: Cardiology;  Laterality: N/A;    CARPAL TUNNEL RELEASE Bilateral     CHOLECYSTECTOMY      COLONOSCOPY  10/06/2014    [facility]    ENDOSCOPY  08/29/2014    Severe esophagitis, gastritis, neg for H pylori, Walters's, and fungi    GASTRIC BYPASS      INCISIONAL HERNIA REPAIR      TOTAL HIP ARTHROPLASTY Bilateral     TOTAL KNEE ARTHROPLASTY Right 6/6/2023    Procedure: TOTAL KNEE ARTHROPLASTY;  Surgeon: Bonifacio Dwyer MD;  Location:  [facility];  Service: Orthopedics;  Laterality: Right;    TOTAL KNEE ARTHROPLASTY Left 12/26/2023    Procedure: TOTAL KNEE ARTHROPLASTY;  Surgeon: Bonifacio Dwyer MD;  Location:  [facility];  Service: Orthopedics;  Laterality: Left;    VENA CAVA FILTER INSERTION        General Information       Row Name 12/27/23 0952          Physical Therapy Time and Intention    Document Type discharge evaluation/summary  -MS     Mode of Treatment physical therapy  -MS       Row Name 12/27/23 0945          General Information    Patient Profile Reviewed yes  -MS     Prior Level of Function independent:;all household mobility  access to RW  -MS     Existing Precautions/Restrictions fall  -MS     Barriers to Rehab none identified  -MS       Row Name 12/27/23 0951          Living Environment    People in Home alone  -MS       Row Name 12/27/23 0971          Home Main Entrance    Number of Stairs, Main Entrance --  ramp to enter  -MS       Row Name 12/27/23 0909          Cognition    Orientation Status (Cognition)  oriented x 4  -MS       Row Name 12/27/23 0944          Safety Issues, Functional Mobility    Safety Issues Affecting Function (Mobility) insight into deficits/self-awareness;positioning of assistive device;sequencing abilities  -MS     Impairments Affecting Function (Mobility) strength;endurance/activity tolerance;balance;pain;postural/trunk control;range of motion (ROM)  -MS     Comment, Safety Issues/Impairments (Mobility) Gait belt and non skid socks donned.  -MS               User Key  (r) = Recorded By, (t) = Taken By, (c) = Cosigned By      Initials Name Provider Type    Guadalupe Cardoso, PT Physical Therapist                   Mobility       Row Name 12/27/23 0947          Bed Mobility    Bed Mobility supine-sit  -MS     Supine-Sit [place] (Bed Mobility) supervision;verbal cues  -MS       Row Name 12/27/23 0947          Sit-Stand Transfer    Sit-Stand [place] (Transfers) contact guard;verbal cues  -MS     Assistive Device (Sit-Stand Transfers) walker, front-wheeled  -MS       Row Name 12/27/23 0947          Gait/Stairs (Locomotion)    [place] Level (Gait) supervision;verbal cues;nonverbal cues (demo/gesture)  -MS     Assistive Device (Gait) walker, front-wheeled  -MS     Patient was able to Ambulate yes  -MS     Distance in Feet (Gait) 100'  -MS     Deviations/Abnormal Patterns (Gait) dillon decreased;gait speed decreased;antalgic  -MS     Comment, (Gait/Stairs) No overt LOB or veering noted. Good sequencing.  -MS       Row Name 12/27/23 0947          Mobility    Extremity Weight-bearing Status left lower extremity  -MS     Left Lower Extremity (Weight-bearing Status) weight-bearing as tolerated (WBAT)  -MS               User Key  (r) = Recorded By, (t) = Taken By, (c) = Cosigned By      Initials Name Provider Type    Guadalupe Cardoso, PT Physical Therapist                   Obj/Interventions       Row Name 12/27/23 0941          Range of Motion Comprehensive    General Range of  Motion lower extremity range of motion deficits identified  -MS     Comment, General Range of Motion L LE limited 2/2 pain  -MS       Row Name 12/27/23 0948          Strength Comprehensive (MMT)    General Manual Muscle Testing (MMT) Assessment lower extremity strength deficits identified  -MS     Comment, General Manual Muscle Testing (MMT) Assessment L LE post op weakness  -MS       Row Name 12/27/23 0948          Motor Skills    Therapeutic Exercise --  L TKA protocol x 10 reps  -MS       Row Name 12/27/23 0948          Balance    Balance Assessment sitting static balance;standing static balance  -MS     Static Sitting Balance independent  -MS     Position, Sitting Balance sitting edge of bed  -MS     Static Standing Balance supervision  -MS     Position/Device Used, Standing Balance supported;walker, rolling  -MS       Row Name 12/27/23 0948          Sensory Assessment (Somatosensory)    Sensory Assessment (Somatosensory) sensation intact  -MS               User Key  (r) = Recorded By, (t) = Taken By, (c) = Cosigned By      Initials Name Provider Type    Guadalupe Cardoso, PT Physical Therapist                   Goals/Plan    No documentation.                  Clinical Impression       Row Name 12/27/23 0949          Pain    Pretreatment Pain Rating 5/10  -MS     Posttreatment Pain Rating 5/10  -MS     Pain Location - Side/Orientation Left  -MS     Pain Location lower  -MS     Pain Location - knee  -MS     Pain Intervention(s) Repositioned;Ambulation/increased activity;Rest;Cold pack  -MS       Row Name 12/27/23 0968          Therapy Assessment/Plan (PT)    Criteria for Skilled Interventions Met (PT) no;does not meet criteria for skilled intervention  -MS     Therapy Frequency (PT) daily  -MS       Row Name 12/27/23 0949          Vital Signs    O2 Delivery Pre Treatment room air  -MS       Row Name 12/27/23 0949          Positioning and Restraints    Pre-Treatment Position in bed  -MS     Post Treatment  Position chair  -MS     In Chair notified nsg;reclined;call light within reach;encouraged to call for assist;exit alarm on  -MS               User Key  (r) = Recorded By, (t) = Taken By, (c) = Cosigned By      Initials Name Provider Type    MS LorenzsGuadalupe, PT Physical Therapist                   Outcome Measures       Row Name 12/27/23 0949          How much help from another person do you currently need...    Turning from your back to your side while in flat bed without using bedrails? 4  -MS     Moving from lying on back to sitting on the side of a flat bed without bedrails? 4  -MS     Moving to and from a bed to a chair (including a wheelchair)? 4  -MS     Standing up from a chair using your arms (e.g., wheelchair, bedside chair)? 4  -MS     Climbing 3-5 steps with a railing? 3  -MS     To walk in hospital room? 4  -MS     AM-PAC 6 Clicks Score (PT) 23  -MS     Highest Level of Mobility Goal 7 --> Walk 25 feet or more  -MS               User Key  (r) = Recorded By, (t) = Taken By, (c) = Cosigned By      Initials Name Provider Type    Guadalupe Cardoso, PT Physical Therapist                                   PT Recommendation and Plan           Time Calculation:         PT Charges       Row Name 12/27/23 0950             Time Calculation    Start Time 0935  -MS      Stop Time 0951  -MS      Time Calculation (min) 16 min  -MS      PT Received On 12/27/23  -MS                User Key  (r) = Recorded By, (t) = Taken By, (c) = Cosigned By      Initials Name Provider Type    Guadalupe Cardoso, PT Physical Therapist                  Therapy Charges for Today       Code Description Service Date Service Provider Modifiers Qty    [id number]  PT EVAL LOW COMPLEXITY 2 12/27/2023 Guadalupe Zavaleta, PT GP 1    [id number] HC PT THER PROC EA 15 MIN 12/27/2023 Guadalupe Zavaleta, PT GP 1            PT G-Codes  AM-PAC 6 Clicks Score (PT): 23  PT Discharge Summary  Anticipated Discharge Disposition (PT): home  with home health, home with assist    Guadalupe Zavaleta, PT  12/27/2023

## 2023-12-27 NOTE — DISCHARGE PLACEMENT REQUEST
"Zack Isabella SÁNCHEZ Jr. \"Nasim\" (68 y.o. Male)       Date of Birth   1955    Social Security Number       Address   [address] [address]    Home Phone   [phone]    MRN   [id number]       [facility]    Marital Status                               Admission Date   12/26/23    Admission Type   Elective    Admitting Provider   Bonifacio Dwyer MD    Attending Provider   Bonifacio Dwyer MD    Department, Room/Bed   [address], P797/1       Discharge Date       Discharge Disposition   Home or Self Care    Discharge Destination                                 Attending Provider: Bonifacio Dwyer MD    Allergies: No Known Allergies    Isolation: None   Infection: None   Code Status: CPR    Ht: 165.1 cm (65\")   Wt: 130 kg (285 lb 9.6 oz)    Admission Cmt: None   Principal Problem: Primary osteoarthritis of left knee [M17.12]                   Active Insurance as of 12/26/2023       Primary Coverage       Payor Plan Insurance Group Employer/Plan Group    HUMANA MEDICARE REPLACEMENT HUMANA MEDICARE REPLACEMENT [id number]       Payor Plan Address Payor Plan Phone Number Payor Plan Fax Number Effective Dates    [address] [address] [phone]  6/1/2021 - None Entered    [facility] [id number]         Subscriber Name Subscriber Birth Date Member ID       ISABELLA MOCK  1955 [id number]                     Emergency Contacts        (Rel.) Home Phone Work Phone Mobile Phone    Johnny Mock (Son) -- -- [phone]    PAOLA WOLF (Sister) [phone] -- --    KATIE LEARY (Sister) -- -- [phone]              "

## 2023-12-27 NOTE — PLAN OF CARE
Goal Outcome Evaluation:  Plan of Care Reviewed With: patient        Progress: improving  Outcome Evaluation: Pt remains stable. Ambulates with walker use and 1 assist, will need PT eval today. Dressing is cdi with hv in place. Pain well controlled with percocet. Voiding without difficulty. Educated on sleep apnea management, CPAP worn while sleeping. Plans to dc home today.

## 2023-12-27 NOTE — PROGRESS NOTES
"  Orthopaedic Surgery  Progress Note  12/27/2023    Patients Name:  Armando Dixon Jr.  YOB: 1955  Age: 68 y.o.  Medical Records Number:  [id number]  Date of Admission: 12/26/2023    No complaints except appropriate pain and stiffness in the left knee    Vitals:  Vitals:    12/26/23 1734 12/26/23 2121 12/27/23 0155 12/27/23 0621   BP: 156/74 141/69 145/74 144/72   BP Location: Left arm Left arm Left arm Left arm   Patient Position: Lying Lying Lying Lying   Pulse: 85 89 75 67   Resp: 16 16 16 16   Temp: 98.4 °F (36.9 °C) 98.3 °F (36.8 °C) 97.8 °F (36.6 °C) 97.9 °F (36.6 °C)   TempSrc: Oral Oral Oral Oral   SpO2: 97% 96% 95% 94%   Weight: 130 kg (285 lb 9.6 oz)      Height: 165.1 cm (65\")          LLE:  NVI, calf nontender, Sensation intact  No signs of DVT    Incision: clean, no signs of infection    Lab Results (last 24 hours)       Procedure Component Value Units Date/Time    Basic Metabolic Panel [[id number]]  (Abnormal) Collected: 12/27/23 0442    Specimen: Blood Updated: 12/27/23 0533     Glucose 178 mg/dL      BUN 20 mg/dL      Creatinine 1.07 mg/dL      Sodium 135 mmol/L      Potassium 4.1 mmol/L      Chloride 101 mmol/L      CO2 24.0 mmol/L      Calcium 8.6 mg/dL      BUN/Creatinine Ratio 18.7     Anion Gap 10.0 mmol/L      eGFR 76.1 mL/min/1.73     Narrative:      GFR Normal >60  Chronic Kidney Disease <60  Kidney Failure <15      CBC (No Diff) [[id number]]  (Abnormal) Collected: 12/27/23 0442    Specimen: Blood Updated: 12/27/23 0515     WBC 10.56 10*3/mm3      RBC 4.19 10*6/mm3      Hemoglobin 12.2 g/dL      Hematocrit 36.9 %      MCV 88.1 fL      MCH 29.1 pg      MCHC 33.1 g/dL      RDW 13.1 %      RDW-SD 42.3 fl      MPV 9.4 fL      Platelets 232 10*3/mm3             XR Knee 1 or 2 View Left    Result Date: 12/26/2023  Narrative: XR KNEE 1 OR 2 VW LEFT-12/26/2023  HISTORY: Postop left knee arthroplasty.  Distal femoral and proximal tibia components of the left knee prosthesis are well " seated with no abnormal surrounding bony lucencies. Soft tissue air skin staples and small caliber catheter are seen. No unexpected findings are noted.      Impression: 1. Satisfactory postoperative appearance of the left knee.   This report was finalized on 12/26/2023 4:05 PM by Dr. Tomás Nava M.D on Workstation: [id number]      Peripheral Block    Result Date: 12/26/2023  Narrative: Elma Sawyer MD     12/26/2023 12:43 PM Peripheral Block Pre-sedation assessment completed: 12/26/2023 12:30 PM Patient reassessed immediately prior to procedure Patient location during procedure: holding area Start time: 12/26/2023 12:38 PM Stop time: 12/26/2023 12:42 PM Reason for block: at surgeon's request and post-op pain management Performed by Anesthesiologist: Elma Sawyer MD Preanesthetic Checklist Completed: patient identified, IV checked, site marked, risks and benefits discussed, surgical consent, monitors and equipment checked, pre-op evaluation and timeout performed Prep: Pt Position: sitting Sterile barriers:cap, gloves, mask and sterile barriers Prep: ChloraPrep Patient monitoring: blood pressure monitoring, continuous pulse oximetry and EKG Procedure Sedation: yes Performed under: PNB Guidance:ultrasound guided ULTRASOUND INTERPRETATION.  Using ultrasound guidance a 22 G gauge needle was placed in close proximity to the femoral nerve, at which point, under ultrasound guidance anesthetic was injected in the area of the nerve and spread of the anesthesia was seen on ultrasound in close proximity thereto.  There were no abnormalities seen on ultrasound; a digital image was taken; and the patient tolerated the procedure with no complications. Images:still images obtained Laterality:left Block Type:adductor canal block Injection Technique:single-shot Needle Type:echogenic Needle Gauge:20 G Resistance on Injection: none Medications Used: dexamethasone (DECADRON) injection - Injection  4 mg  - 12/26/2023 12:38:00 PM ropivacaine (NAROPIN) 0.5 % injection - Injection  20 mL - 12/26/2023 12:38:00 PM Mepivacaine HCl (PF) (CARBOCAINE) 1.5 % injection - Injection  5 mL - 12/26/2023 12:38:00 PM Medications Comment:U/S guidance used for needle placement & medication dispersement.  Photo placed in chart.  Post Assessment Injection Assessment: negative aspiration for heme, no paresthesia on injection and incremental injection Patient Tolerance:comfortable throughout block Complications:no     XR Chest PA & Lateral, XR Knee 1 or 2 View Left    Result Date: 12/13/2023  Narrative: XR CHEST PA AND LATERAL-, XR KNEE 1 OR 2 VW LEFT-  HISTORY: 68-year-old male with history of left knee pain. Preoperative evaluation.  FINDINGS:  CHEST: There is no evidence for pneumonia, effusions, or CHF.  LEFT KNEE: There are severe osteoarthritic changes, most severe at the medial tibiofemoral compartment. There is no joint effusion or acute abnormality.  This report was finalized on 12/13/2023 1:58 PM by Dr. Randi Patel M.D on Workstation: [id number]       Assesment/Plan:    Procedures:  Left TKA  Postoperative Day: 1  Weightbearing Status:  WBAT with walker  DVT Prophylaxis:  ASA for DVT prophylaxis    Disposition:  Home with home health after PT today, if comfortable and mobilizing safely    Bonifacio Slater PA-C  [facility]  [address] [place] [address]  [phone]    12/27/2023

## 2023-12-27 NOTE — PLAN OF CARE
Goal Outcome Evaluation:     Patient is a 68 y.o. male POD1 L TKA with expected post op weakness and impaired functional mobility. Patient is ind at baseline and lives at home with grandchildren who can assist as needed. Access to RW. Today, patient performed bed mobility with SV, required CGA for transfers and ambulated 100' SV with a RW. No safety concerns demo'd. Based on current clinical presentation, patient okay to DC home today with assist and  PT to follow up.        Anticipated Discharge Disposition (PT): home with home health, home with assist

## 2023-12-27 NOTE — CASE MANAGEMENT/SOCIAL WORK
Discharge Planning Assessment  [facility]     Patient Name: Armando Dixon Jr.  MRN: [id number]  Today's Date: 12/27/2023    Admit Date: 12/26/2023    Plan: Home with family support & [facility] .   Discharge Needs Assessment       Row Name 12/27/23 0916       Living Environment    People in Home alone    Current Living Arrangements home    Primary Care Provided by self    Provides Primary Care For no one    Family Caregiver if Needed sibling(s)    Quality of Family Relationships helpful;involved;supportive    Able to Return to Prior Arrangements yes       Resource/Environmental Concerns    Transportation Concerns none       Transition Planning    Patient/Family Anticipates Transition to home with family;home with help/services    Patient/Family Anticipated Services at Transition     Transportation Anticipated family or friend will provide       Discharge Needs Assessment    Readmission Within the Last 30 Days no previous admission in last 30 days    Equipment Currently Used at Home cane, straight;shower chair    Discharge Facility/Level of Care Needs home with home health    Provided Post Acute Provider List? N/A    N/A Provider List Comment Decline; requests [facility] .    Patient's Choice of Community Agency(s) [facility] .                   Discharge Plan       Row Name 12/27/23 0918       Plan    Plan Home with family support & [facility] .    Patient/Family in Agreement with Plan yes    Plan Comments Spoke with the patient, verified current information and explained the role of the CCP. Patient said he lives with his son/Johnny and has family support. He's IADL, owns a cane & shower chair and has [facility] with [facility]  in the past. Patient plans to d/c home with family support & [facility] . Referral sent in [facility]. Patient also plans for his family to transport him home at d/c. No other needs identified. CCP will follow.                  Continued Care and Services - Admitted Since 12/26/2023       Home Medical Care        Service Provider Request Status Selected Services Address Phone Fax Patient Preferred    [facility] Accepted N/A [address]  [facility] [id number] [phone] --                  Expected Discharge Date and Time       Expected Discharge Date Expected Discharge Time    Dec 27, 2023            Demographic Summary       Row Name 12/27/23 0915       General Information    Admission Type observation    Reason for Consult discharge planning    Preferred Language English       Contact Information    Permission Granted to Share Info With ;family/designee                   Functional Status       Row Name 12/27/23 0915       Functional Status    Usual Activity Tolerance good       Functional Status, IADL    Medications independent    Meal Preparation assistive equipment    Housekeeping assistive equipment    Laundry assistive equipment    Shopping assistive equipment       Mental Status Summary    Recent Changes in Mental Status/Cognitive Functioning no changes                   Psychosocial       Row Name 12/27/23 0916       Intellectual Performance WDL    Level of Consciousness Alert       Coping/Stress    Patient Personal Strengths able to adapt    Sources of Support sibling(s)    Reaction to Health Status accepting    Understanding of Condition and Treatment adequate understanding of medical condition;adequate understanding of treatment       Developmental Stage (Eriksson's)    Developmental Stage Stage 8 (65 years-death/Late Adulthood) Integrity vs. Jerardo RAMEY, RN

## 2023-12-27 NOTE — PROGRESS NOTES
"Nutrition Services    Patient Name:  Armando Dixon Jr.  YOB: 1955  MRN: [id number]  Admit Date:  12/26/2023  Assessment Date:  12/27/23    NUTRITION SCREENING      Reason for Encounter MST score 2+   Diagnosis/Problem Degenerative joint disease of lower leg    s/p left total knee arthroplasty        PO Diet Diet: Regular/House Diet; Texture: Regular Texture (IDDSI 7); Fluid Consistency: Thin (IDDSI 0)   Supplements    PO Intake % 100%       Medications MAR reviewed by RD   Labs  Listed below, reviewed   Physical Findings Obese, oxygen therapy   GI Function 12/26 last BM   Skin Status incision       Height  Weight  BMI  Weight Trend     Height: 165.1 cm (65\")  Weight: 130 kg (285 lb 9.6 oz) (12/26/23 1734)  Body mass index is 47.53 kg/m².  Stable       Nutrition Problem (PES) No nutrition diagnosis at this time.       Intervention/Plan RD to follow up per protocol.     Results from last 7 days   Lab Units 12/27/23  0442   SODIUM mmol/L 135*   POTASSIUM mmol/L 4.1   CHLORIDE mmol/L 101   CO2 mmol/L 24.0   BUN mg/dL 20   CREATININE mg/dL 1.07   CALCIUM mg/dL 8.6   GLUCOSE mg/dL 178*     Results from last 7 days   Lab Units 12/27/23  0442   HEMOGLOBIN g/dL 12.2*   HEMATOCRIT % 36.9*     Lab Results   Component Value Date    HGBA1C 5.3 01/13/2020         Electronically signed by:  Ofelia Conn RD  12/27/23 12:06 EST  "

## 2023-12-31 NOTE — CASE MANAGEMENT/SOCIAL WORK
Case Management Discharge Note      Final Note: Home w/HH    Provided Post Acute Provider List?: N/A  N/A Provider List Comment: Decline; requests [facility].    Selected Continued Care - Discharged on 12/27/2023 Admission date: 12/26/2023 - Discharge disposition: Home or Self Care      Destination    No services have been selected for the patient.                Durable Medical Equipment    No services have been selected for the patient.                Dialysis/Infusion    No services have been selected for the patient.                Home Medical Care Coordination complete.      Service Provider Selected Services Address Phone Fax Patient Preferred    [facility] [address] [place] [id number] [phone] --              Therapy    No services have been selected for the patient.                Community Resources    No services have been selected for the patient.                Community & DME    No services have been selected for the patient.                         Final Discharge Disposition Code: 06 - home with home health care

## 2024-01-02 ENCOUNTER — TELEPHONE (OUTPATIENT)
Dept: ORTHOPEDIC SURGERY | Facility: HOSPITAL | Age: 69
End: 2024-01-02
Payer: MEDICARE

## 2024-01-02 NOTE — TELEPHONE ENCOUNTER
Called and spoke with Mr. Dixon to see how he is doing as he is 1 week SP TKA. He said he is doing fair. This one is rougher than the other one. He is having more pain and got behind, he is still having to take 2 pills regularly to keep the pain controlled. He said he was calling the office today for a refill on his medication. He is working with PT and making some progress. He still hasn't received the ROM Tech bike and thinks that may have something to do with the issues he's having. He hopes to hear something about that today. He was able to take a shower without issue yesterday. All in all he is doing well, just no as good as he did with the other one. Mr. Dixon doesn't have any questions for me at this time. He was given my contact information should he need anything.

## 2024-02-07 RX ORDER — TRIAMTERENE AND HYDROCHLOROTHIAZIDE 37.5; 25 MG/1; MG/1
1 CAPSULE ORAL EVERY MORNING
Qty: 30 CAPSULE | Refills: 5 | Status: SHIPPED | OUTPATIENT
Start: 2024-02-07

## 2024-03-19 RX ORDER — ATORVASTATIN CALCIUM 20 MG/1
20 TABLET, FILM COATED ORAL DAILY
Qty: 90 TABLET | Refills: 1 | Status: SHIPPED | OUTPATIENT
Start: 2024-03-19

## 2024-05-24 ENCOUNTER — OFFICE VISIT (OUTPATIENT)
Dept: CARDIOLOGY | Facility: CLINIC | Age: 69
End: 2024-05-24
Payer: MEDICARE

## 2024-05-24 VITALS
BODY MASS INDEX: 43.87 KG/M2 | SYSTOLIC BLOOD PRESSURE: 129 MMHG | HEART RATE: 71 BPM | HEIGHT: 66 IN | WEIGHT: 273 LBS | DIASTOLIC BLOOD PRESSURE: 71 MMHG

## 2024-05-24 DIAGNOSIS — G47.33 OBSTRUCTIVE SLEEP APNEA SYNDROME: Primary | ICD-10-CM

## 2024-05-24 DIAGNOSIS — E78.2 MIXED HYPERLIPIDEMIA: ICD-10-CM

## 2024-05-24 DIAGNOSIS — I10 ESSENTIAL HYPERTENSION: ICD-10-CM

## 2024-05-24 PROCEDURE — 1159F MED LIST DOCD IN RCRD: CPT | Performed by: NURSE PRACTITIONER

## 2024-05-24 PROCEDURE — 3074F SYST BP LT 130 MM HG: CPT | Performed by: NURSE PRACTITIONER

## 2024-05-24 PROCEDURE — 3078F DIAST BP <80 MM HG: CPT | Performed by: NURSE PRACTITIONER

## 2024-05-24 PROCEDURE — 99214 OFFICE O/P EST MOD 30 MIN: CPT | Performed by: NURSE PRACTITIONER

## 2024-05-24 PROCEDURE — 1160F RVW MEDS BY RX/DR IN RCRD: CPT | Performed by: NURSE PRACTITIONER

## 2024-05-24 RX ORDER — HYDROCODONE BITARTRATE AND ACETAMINOPHEN 10; 325 MG/1; MG/1
TABLET ORAL
COMMUNITY
Start: 2024-05-10

## 2024-05-24 NOTE — PROGRESS NOTES
Subjective:        Armando Dixon Jr. is a 68 y.o. male who here for follow up    No chief complaint on file.      HPI    Armando Dixon is a 68-year-old male who is current with this provider.  He has a history of obesity, ENRRIQUE, CAD, hyperlipidemia, Vena cava filter at [facility] and hypertension.    Stress test in June 2022 showed no ECG evidence of myocardial ischemia.  Findings consistent with a normal ECG stress test.  Cardiac cath in 2020 with stent placement and successful angioplasty to mid LAD.      The following portions of the patient's history were reviewed and updated as appropriate: allergies, current medications, past family history, past medical history, past social history, past surgical history and problem list.    Past Medical History:   Diagnosis Date    Allergic     Anxiety     Arthritis     Asthma     CHILDOOD    Coronary artery disease     STENT X 2    DDD (degenerative disc disease), cervical     LUMBAR, THORACIC, SACRAL    Deep vein thrombosis     Depression     Esophagitis with gastritis     GERD (gastroesophageal reflux disease)     History of kidney stones     Hyperlipidemia     Hypertension     Incisional hernia     Left knee pain     Numbness     LEFT LATERAL THIGH S/P HIP ARTHROPLASTY/MEDIAL PAIN    ENRRIQUE on CPAP     Osteoarthritis of left knee     Osteoporosis     Prediabetes     Prostatitis     Pulmonary embolism     Urgency incontinence          reports that he has never smoked. He has never used smokeless tobacco. He reports current alcohol use. He reports that he does not use drugs.     Family History   Problem Relation Age of Onset    Hypertension Mother     Arrhythmia Mother     Hyperlipidemia Neg Hx     Heart disease Neg Hx     Heart failure Neg Hx     Heart attack Neg Hx     Malig Hyperthermia Neg Hx        ROS     Review of Systems  Constitutional: No wt loss, fever, fatigue  Gastrointestinal: No nausea, abdominal pain  Behavioral/Psych: No insomnia or anxiety  Cardiovascular :  denies cp or shob      Objective:           Constitutional:       Appearance: Well-developed.   Neck:      Vascular: No JVD.      Trachea: No tracheal deviation.   Pulmonary:      Effort: Pulmonary effort is normal. No respiratory distress.      Breath sounds: Normal breath sounds. No stridor. No decreased breath sounds. No wheezing. No rhonchi. No rales.   Chest:      Chest wall: Not tender to palpatation.   Cardiovascular:      Normal rate. Regular rhythm.      No gallop.       Comments:    Pulses:     Intact distal pulses.   Edema:     Peripheral edema absent.   Abdominal:      General: Bowel sounds are normal. There is no distension.      Palpations: Abdomen is soft.      Tenderness: There is no abdominal tenderness.   Musculoskeletal:      Comments: Uses a walker Skin:     General: Skin is warm.   Neurological:      Mental Status: Alert and oriented to person, place, and time.      Deep Tendon Reflexes: Reflexes are normal and symmetric.         Procedures  2020  HEMODYNAMIC / ANGIOGRAPHIC DATA:    Left ventricular end diastolic pressure was 10 mmHg.  Left ventriculography revealed an EF around 60%.    The left main is, normal left main.  The left anterior descending artery is , Left anterior descending long diffuse disease with a midportion consequence 90% stenosis reduced to 0% with 2.5/33 and 2.75/8 Xience stent,Ostial diagonal branch jailed and was dilated with a 2.5/12 trek balloon  The left circumflex is .Circumflex artery nondominant with OM ostial 60% stenosis  The right coronary artery is .Right coronary artery dominant posterolateral branch 50% stenosis  Successful angioplasty and stent to the mid 90% x 2 lesions and the diagonal branch ostial 80% reduced to 0% with 2.5/33 and 2.75/8 Xience stent in LAD, diagonal branch was jailed but was dilated with 2.5/12 trek balloon     YASEMIN FLOW    PRE.... 3...       POST..... 3.     TYPE OF LESION........C.....     RECOMMENDATIONS:  Post-procedure care will  focus on prevention of any ischemic events and congestive complications.  Aggressive risk factor modification will be carried out.     Importance of taking dual antiplatelets for one year  has been explained, risk of stent thrombosis leading to the acute MI, which carries high morbidity and mortality has been explained     Discontinuation or interruptions of these medications should be under the strict guidance of appropriate health professional     2023   Interpretation Summary         Findings consistent with a normal ECG stress test.    Left ventricular ejection fraction is normal (Calculated EF = 60%).    Myocardial perfusion imaging indicates a normal myocardial perfusion study with no evidence of ischemia.    Impressions are consistent with a low risk study.     Asymptomatic for chest pain. ECG is negative for ischemia.   Ectopy: none  B/P: Appropriate for Beta-blocker therapy Baseline 117/53, Peak (post Lexiscan) 98/40, Recovery:109//38     Pharmacologic study due to inability to tolerate increasing speed and grade of treadmill due to orthopedic, mobility  issues and Beta-blocker therapy.  Unable to participate in low level exercise due to unsteady gait, poor balance and poor mobility. Uses cane for ambulation.   Supervised by: Dr. Adams      2023Interpretation Summary         Left ventricular ejection fraction appears to be 66 - 70%.    Left ventricular diastolic function was normal.    Current Outpatient Medications:     acetaminophen (TYLENOL) 500 MG tablet, Take 1 tablet by mouth Every 6 (Six) Hours As Needed for Mild Pain., Disp: , Rfl:     ALBUTEROL IN, Inhale As Needed (WHEEZING)., Disp: , Rfl:     alfuzosin (UROXATRAL) 10 MG 24 hr tablet, Take 1 tablet by mouth Daily., Disp: , Rfl:     aspirin 325 MG EC tablet, Take 1 tablet by mouth 2 (Two) Times a Day With Meals., Disp: 60 tablet, Rfl: 0    atorvastatin (LIPITOR) 20 MG tablet, TAKE 1 TABLET BY MOUTH DAILY, Disp: 90 tablet, Rfl: 1     calcium carbonate (TUMS) 500 MG chewable tablet, Chew 1 tablet Daily., Disp: , Rfl:     cyclobenzaprine (FLEXERIL) 10 MG tablet, Take 1 tablet by mouth 2 (Two) Times a Day As Needed for Muscle Spasms., Disp: 20 tablet, Rfl: 0    docusate sodium (COLACE) 250 MG capsule, Take 1 capsule by mouth 2 (Two) Times a Day As Needed for Constipation., Disp: 30 capsule, Rfl: 1    doxepin (SINEquan) 25 MG capsule, Take 2 capsules by mouth Every Night., Disp: , Rfl:     DULoxetine (CYMBALTA) 30 MG capsule, Take 1 capsule by mouth 3 times a day., Disp: , Rfl:     DULoxetine HCl (DRIZALMA) 30 MG capsule, Take  by mouth Daily., Disp: , Rfl:     gabapentin (NEURONTIN) 300 MG capsule, 3 capsules Every Morning.  CAPSULES AT BEDTIME, Disp: , Rfl:     lamoTRIgine (LaMICtal) 150 MG tablet, Take 2 tablets by mouth Every Night., Disp: , Rfl:     lisdexamfetamine (Vyvanse) 50 MG capsule, Take 1 capsule by mouth Every Morning, Disp: , Rfl:     metoprolol tartrate (LOPRESSOR) 25 MG tablet, TAKE 1/2 TABLET BY MOUTH TWICE A DAY, Disp: 90 tablet, Rfl: 1    montelukast (SINGULAIR) 10 MG tablet, Take 1 tablet by mouth Every Night., Disp: , Rfl:     nitroglycerin (NITROSTAT) 0.4 MG SL tablet, 1 under the tongue as needed for angina, may repeat q5mins for up three doses, Disp: 25 tablet, Rfl: 3    omeprazole (priLOSEC) 40 MG capsule, Take 1 capsule by mouth 2 (Two) Times a Day., Disp: , Rfl:     oxyCODONE-acetaminophen (PERCOCET) 5-325 MG per tablet, Take 1-2 tablets by mouth Every 4 (Four) Hours As Needed for pain., Disp: 42 tablet, Rfl: 0    triamterene-hydrochlorothiazide (DYAZIDE) 37.5-25 MG per capsule, TAKE ONE CAPSULE BY MOUTH EVERY MORNING, Disp: 30 capsule, Rfl: 5     Assessment:        Patient Active Problem List   Diagnosis    Essential hypertension    Class 1 obesity due to excess calories without serious comorbidity with body mass index (BMI) of 30.0 to 30.9 in adult    Mixed hyperlipidemia    Abnormal cardiac function test     Coronary artery disease involving native coronary artery of native heart without angina pectoris    Abnormal electrocardiogram    Preoperative examination, unspecified    Primary osteoarthritis of right knee    Primary osteoarthritis of left knee               Plan:   1.  Hypertension: Today in the office blood pressure stable.  Continue beta-blockade, and Dyazide    Educated patient on exercising for at least 30 minutes a day for 2 to 3 days a week. Importance of controlling hypertension and blood pressure checkup on the regular basis has been explained. Hypertension as a silent killer has been discussed. Risk reduction of the weight and regular exercises to control the hypertension has been explained.    2.  Hyperlipidemia: Previous lipid panel reviewed. On statin. His Pcp manages his labs and cholesterol.     Risk of the hyperlipidemia, importance of the treatment has been explained. Pros and cons of the statins has been explained. Regular blood workup as well as side effects including the liver failure, myelopathy death has been explained.    3.  CAD with history of stent placement. On statin, asa, and bb.    Risk reduction for the coronary artery disease, controlling the blood pressure, blood sugar management, cholesterol management, exercise, stress management, and proper compliance with medications and follow-up has been discussed    4.  Obesity with BMI 44.06    5. Richard: CPAP compliant             No diagnosis found.    There are no diagnoses linked to this encounter.    COUNSELING: done    Armando Dixon Jr.Counseling was given to patient for the following topics: diagnostic results, risk factor reductions, impressions, risks and benefits of treatment options and importance of treatment compliance .       SMOKING COUNSELING: denies     He will follow up in 6 months, unless he needs to be seen sooner.      Sincerely,   EDU Maldonado  [facility]  05/24/24  12:59 EDT    EMR  Dragon/Transcription disclaimer: Dictated utilizing Dragon Dictation

## 2024-08-01 ENCOUNTER — HOSPITAL ENCOUNTER (OUTPATIENT)
Facility: HOSPITAL | Age: 69
Discharge: HOME | End: 2024-08-01
Attending: EMERGENCY MEDICINE | Admitting: EMERGENCY MEDICINE
Payer: MEDICARE

## 2024-08-01 ENCOUNTER — APPOINTMENT (OUTPATIENT)
Dept: GENERAL RADIOLOGY | Facility: HOSPITAL | Age: 69
End: 2024-08-01
Payer: MEDICARE

## 2024-08-01 VITALS
WEIGHT: 275 LBS | RESPIRATION RATE: 14 BRPM | SYSTOLIC BLOOD PRESSURE: 136 MMHG | BODY MASS INDEX: 44.2 KG/M2 | HEIGHT: 66 IN | DIASTOLIC BLOOD PRESSURE: 61 MMHG | OXYGEN SATURATION: 96 % | HEART RATE: 77 BPM | TEMPERATURE: 98.5 F

## 2024-08-01 DIAGNOSIS — S90.02XA CONTUSION OF LEFT ANKLE, INITIAL ENCOUNTER: ICD-10-CM

## 2024-08-01 DIAGNOSIS — M54.16 LUMBAR RADICULOPATHY: Primary | ICD-10-CM

## 2024-08-01 DIAGNOSIS — S80.02XA CONTUSION OF LEFT KNEE, INITIAL ENCOUNTER: ICD-10-CM
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
## 2024-08-01 LAB
BILIRUB UR QL STRIP: NEGATIVE
CLARITY UR: CLEAR
COLOR UR: YELLOW
GLUCOSE UR STRIP-MCNC: ABNORMAL MG/DL
HGB UR QL STRIP.AUTO: NEGATIVE
KETONES UR QL STRIP: NEGATIVE
LEUKOCYTE ESTERASE UR QL STRIP.AUTO: NEGATIVE
NITRITE UR QL STRIP: NEGATIVE
PH UR STRIP.AUTO: 5.5 [PH] (ref 5–8)
PROT UR QL STRIP: NEGATIVE
SP GR UR STRIP: 1.02 (ref 1–1.03)
UROBILINOGEN UR QL STRIP: ABNORMAL

## 2024-08-01 PROCEDURE — 73610 X-RAY EXAM OF ANKLE: CPT

## 2024-08-01 PROCEDURE — 99203 OFFICE O/P NEW LOW 30 MIN: CPT | Performed by: EMERGENCY MEDICINE

## 2024-08-01 PROCEDURE — 81003 URINALYSIS AUTO W/O SCOPE: CPT | Performed by: EMERGENCY MEDICINE

## 2024-08-01 PROCEDURE — 25010000002 DEXAMETHASONE SODIUM PHOSPHATE 10 MG/ML SOLUTION: Performed by: EMERGENCY MEDICINE

## 2024-08-01 PROCEDURE — G0463 HOSPITAL OUTPT CLINIC VISIT: HCPCS | Performed by: EMERGENCY MEDICINE

## 2024-08-01 PROCEDURE — 25010000002 FENTANYL CITRATE (PF) 50 MCG/ML SOLUTION: Performed by: EMERGENCY MEDICINE

## 2024-08-01 PROCEDURE — 73590 X-RAY EXAM OF LOWER LEG: CPT

## 2024-08-01 PROCEDURE — 72170 X-RAY EXAM OF PELVIS: CPT

## 2024-08-01 PROCEDURE — 73560 X-RAY EXAM OF KNEE 1 OR 2: CPT

## 2024-08-01 RX ORDER — PREDNISONE 20 MG/1
TABLET ORAL
Qty: 9 TABLET | Refills: 0 | Status: SHIPPED | OUTPATIENT
Start: 2024-08-01

## 2024-08-01 RX ORDER — HYDROCODONE BITARTRATE AND ACETAMINOPHEN 5; 325 MG/1; MG/1
1 TABLET ORAL EVERY 6 HOURS PRN
Qty: 12 TABLET | Refills: 0 | Status: SHIPPED | OUTPATIENT
Start: 2024-08-01 | End: 2024-08-04

## 2024-08-01 RX ORDER — DEXAMETHASONE SODIUM PHOSPHATE 10 MG/ML
10 INJECTION, SOLUTION INTRAMUSCULAR; INTRAVENOUS ONCE
Status: COMPLETED | OUTPATIENT
Start: 2024-08-01 | End: 2024-08-01

## 2024-08-01 RX ORDER — HYDROCODONE BITARTRATE AND ACETAMINOPHEN 7.5; 325 MG/1; MG/1
1 TABLET ORAL ONCE
Status: COMPLETED | OUTPATIENT
Start: 2024-08-01 | End: 2024-08-01

## 2024-08-01 RX ORDER — FENTANYL CITRATE 50 UG/ML
75 INJECTION, SOLUTION INTRAMUSCULAR; INTRAVENOUS ONCE
Status: COMPLETED | OUTPATIENT
Start: 2024-08-01 | End: 2024-08-01

## 2024-08-01 RX ADMIN — DEXAMETHASONE SODIUM PHOSPHATE 10 MG: 10 INJECTION, SOLUTION INTRAMUSCULAR; INTRAVENOUS at 21:58

## 2024-08-01 RX ADMIN — HYDROCODONE BITARTRATE AND ACETAMINOPHEN 1 TABLET: 7.5; 325 TABLET ORAL at 20:19

## 2024-08-01 RX ADMIN — FENTANYL CITRATE 75 MCG: 50 INJECTION INTRAMUSCULAR; INTRAVENOUS at 21:56

## 2024-08-02 NOTE — DISCHARGE INSTRUCTIONS
Today we do not detect any evidence of fracture on your left hip knee or lower leg.    I did give you an intramuscular dose of a steroid as well as send in a prescription for tapering steroid pack over the next 6 days.  I think this will reduce the inflammation and improve your neuropathy to get you back to your baseline.    I did also send in some appropriate pain control for the next 3 days.    Return for increasing pain numbness.    Please read all of the instructions in this handout.  If you receive prescriptions please fill them and take them as directed.  Please call your primary care physician for follow-up appointment in the next 5 to 7 days.  If you do not have a physician you may call the [facility] referral line at [phone].    You may return to the emergency department at any time for any concerns such as worsening symptoms.  If you received a work or school note it will be printed at the back of this packet.

## 2024-08-02 NOTE — [facility] PROVIDER NOTE
EMERGENCY DEPARTMENT ENCOUNTER    Room Number:  05/05  Date seen:  8/2/2024  Time seen: 20:16 EDT  PCP: Bonifacio Dodd MD  Historian:     Discussed/obtained information from independent historians:     HPI:  The patient is a very nice 69-year-old male.  He does have a history of neuropathy primarily affecting his left lower extremity.  He also has a history of left total knee replacement approximately 1 year ago.  He utilizes a walker for ambulation.  He states that he is currently in therapy for the neuropathy and has shown improvement.  He has a history of falls secondary to weakness in the left knee.  He presents today after mechanical fall at home just prior to arrival.  He states that he had gone to the restroom and when he leaned over to gather his pants, he had weakness in the knee and fell to the ground.  He did not strike his head or lose consciousness.  At this time he complains of pain at the knee tib-fib and left ankle.  He is not anticoagulated.      External (non-ED) record review:        Chronic or social conditions impacting care:    ALLERGIES  Patient has no known allergies.    PAST MEDICAL HISTORY  Active Ambulatory Problems     Diagnosis Date Noted    Essential hypertension 07/05/2018    Class 1 obesity due to excess calories without serious comorbidity with body mass index (BMI) of 30.0 to 30.9 in adult 07/05/2018    Mixed hyperlipidemia 12/24/2019    Abnormal cardiac function test 02/24/2020    Coronary artery disease involving native coronary artery of native heart without angina pectoris 03/10/2020    Abnormal electrocardiogram 05/23/2023    Preoperative examination, unspecified 05/23/2023    Primary osteoarthritis of right knee 06/06/2023    Primary osteoarthritis of left knee 12/26/2023     Resolved Ambulatory Problems     Diagnosis Date Noted    No Resolved Ambulatory Problems     Past Medical History:   Diagnosis Date    Allergic     Anxiety     Arthritis     Asthma     Coronary  artery disease     DDD (degenerative disc disease), cervical     Deep vein thrombosis     Depression     Esophagitis with gastritis     GERD (gastroesophageal reflux disease)     History of kidney stones     Hyperlipidemia     Hypertension     Incisional hernia     Left knee pain     Numbness     ENRRIQUE on CPAP     Osteoarthritis of left knee     Osteoporosis     Prediabetes     Prostatitis     Pulmonary embolism     Urgency incontinence        PAST SURGICAL HISTORY  Past Surgical History:   Procedure Laterality Date    CARDIAC CATHETERIZATION      CARDIAC CATHETERIZATION N/A 02/28/2020    Procedure: Left Heart Cath;  Surgeon: Minerva Adams MD;  Location: [facility] CATH INVASIVE LOCATION;  Service: Cardiology;  Laterality: N/A;    CARDIAC CATHETERIZATION N/A 02/28/2020    Procedure: Coronary angiography;  Surgeon: Minerva Adams MD;  Location: [facility] CATH INVASIVE LOCATION;  Service: Cardiology;  Laterality: N/A;    CARDIAC CATHETERIZATION N/A 02/28/2020    Procedure: Left ventriculography;  Surgeon: Minerva Adams MD;  Location: [facility] CATH INVASIVE LOCATION;  Service: Cardiology;  Laterality: N/A;    CARDIAC CATHETERIZATION N/A 02/28/2020    Procedure: Percutaneous Coronary Intervention;  Surgeon: Minerva Adams MD;  Location: [facility] CATH INVASIVE LOCATION;  Service: Cardiology;  Laterality: N/A;    CARDIAC CATHETERIZATION N/A 02/28/2020    Procedure: Stent FADY coronary;  Surgeon: Minerva Adams MD;  Location: [facility] CATH INVASIVE LOCATION;  Service: Cardiology;  Laterality: N/A;    CARPAL TUNNEL RELEASE Bilateral     CHOLECYSTECTOMY      COLONOSCOPY  10/06/2014    [facility]    ENDOSCOPY  08/29/2014    Severe esophagitis, gastritis, neg for H pylori, Walters's, and fungi    GASTRIC BYPASS      INCISIONAL HERNIA REPAIR      TOTAL HIP ARTHROPLASTY Bilateral     TOTAL KNEE ARTHROPLASTY Right 6/6/2023    Procedure: TOTAL KNEE ARTHROPLASTY;  Surgeon: Bonifacio Dwyer MD;  Location: [facility]  OR [facility];  Service: Orthopedics;  Laterality: Right;    TOTAL KNEE ARTHROPLASTY Left 12/26/2023    Procedure: TOTAL KNEE ARTHROPLASTY;  Surgeon: Bonifacio Dwyer MD;  Location: [facility];  Service: Orthopedics;  Laterality: Left;    VENA CAVA FILTER INSERTION         FAMILY HISTORY  Family History   Problem Relation Age of Onset    Hypertension Mother     Arrhythmia Mother     Hyperlipidemia Neg Hx     Heart disease Neg Hx     Heart failure Neg Hx     Heart attack Neg Hx     Malig Hyperthermia Neg Hx        SOCIAL HISTORY  Social History     Socioeconomic History    Marital status:    Tobacco Use    Smoking status: Never    Smokeless tobacco: Never   Vaping Use    Vaping status: Never Used   Substance and Sexual Activity    Alcohol use: Not Currently     Comment: RARELY    Drug use: No    Sexual activity: Defer       REVIEW OF SYSTEMS  Review of Systems    All systems reviewed and negative except for those discussed in HPI.       PHYSICAL EXAM    I have reviewed the triage vital signs and nursing notes.  Vitals:    08/01/24 2000   BP: 136/61   Pulse: 77   Resp: 14   Temp:    SpO2: 96%     Physical Exam  Vital signs: Reviewed in nurses notes    General: Awake alert no acute distress    HEENT: Normocephalic atraumatic nasopharynx clear of muscular moist    Neck:   Nontender to palpation.  Supple    Respiratory:   Nonlabored respirations.  Clear to auscultation bilaterally.  Equal breath sounds bilaterally.  No wheezes or stridor noted.    Cardiovascular: Regular rate and rhythm.  Pedal pulses are intact bilaterally.  He does have trace bilateral pretibial edema    Abdomen: Nondistended nontender    Skin:   Warm and dry.  No rashes noted    Musculoskeletal: Pelvis is stable to rock without tenderness left knee reveals some tenderness palpation on the anterior and lateral aspect of the knee.  No deformity.  Skin is intact.  There is still flexion and extension intact.  There is also some generalized pain  throughout palpation of the tib-fib area.  No deformity or soft tissue swelling noted.  Likewise there is tenderness palpation on the anterior lateral aspect of the left ankle.  Again no deformity or soft tissue swelling noted.  Patient moves all 5 toes    Neurological examination: Patient is awake alert oriented x 4.  Speech is intact.  No facial palsy.  Patient has good strength and tone in bilateral upper extremities.  He has 4+ out of 5 strength on the right lower extremity.  The left lower extremity he does have mild decreased dorsi and plantarflexion.  He still has extension and flexion at the left knee and hip.  He states he has this weakness occasionally from his neuropathy.      LAB RESULTS  Recent Results (from the past 24 hour(s))   Urinalysis With Culture If Indicated - Urine, Clean Catch    Collection Time: 08/01/24  9:38 PM    Specimen: Urine, Clean Catch   Result Value Ref Range    Color, UA Yellow Yellow, Straw    Appearance, UA Clear Clear    pH, UA 5.5 5.0 - 8.0    Specific Gravity, UA 1.020 1.005 - 1.030    Glucose,  mg/dL (Trace) (A) Negative    Ketones, UA Negative Negative    Bilirubin, UA Negative Negative    Blood, UA Negative Negative    Protein, UA Negative Negative    Leuk Esterase, UA Negative Negative    Nitrite, UA Negative Negative    Urobilinogen, UA 0.2 E.U./dL 0.2 - 1.0 E.U./dL       Ordered the above labs and independently interpreted results.  My findings will be discussed in the ED course or medical decision making section below      PROCEDURES:  Procedures      RADIOLOGY RESULTS  XR Tibia Fibula 2 View Left, XR Ankle 3+ View Left, XR Pelvis 1 or 2 View, XR Knee 1 or 2 View Left    Result Date: 8/1/2024  XR PELVIS 1 OR 2 VW-, XR ANKLE 3+ VW LEFT-, XR TIBIA FIBULA 2 VW LEFT-, XR KNEE 1 OR 2 VW LEFT-  INDICATION: Post fall  COMPARISON: Left knee 12/26/2023       Left ankle: No fracture. Ankle mortise maintained on nonweightbearing views. Mild soft tissue swelling about the  ankle.  Left tibia and fibula: No fracture. Normal alignment. Left total knee arthroplasty.  Left knee: Left total knee arthroplasty. Components are well-seated without evidence of periprosthetic fracture or loosening.  Pelvis: Partially visualized bilateral total hip arthroplasties. No fracture/periprosthetic fracture. Please note arthroplasties are only partially visualized. No evidence of subsidence. Heterotopic ossification adjacent to the right anterior superior iliac spine and to a lesser extent, the left greater trochanter.  This report was finalized on 8/1/2024 8:55 PM by Dr. Shai Riggs M.D on Workstation: [employer]        Ordered the above noted radiological studies.  Independently interpreted by me.  My findings will be discussed in the medical decision section below.     PROGRESS, DATA ANALYSIS, CONSULTS AND MEDICAL DECISION MAKING    Please note that this section constitutes my independent interpretation of clinical data including lab results, radiology, EKG's.  This constitutes my independent professional opinion regarding differential diagnosis and management of this patient.  It may include any factors such as history from outside sources, review of external records, social determinants of health, management of medications, response to those treatments, and discussions with other providers.    ED Course as of 08/02/24 0221   u Aug 01, 2024   2136 I did review an encounter from [facility] orthopedic clinic dated 5/15/2024.  At that time his assessment was chronic back pain from widespread lumbar degenerative disc disease with stenosis most severe at L3-L4.  The recommendations at that time were to work on low impact fitness program and continue pain management with consideration for additional injections. [TC]   0625 Patient is stable at this time.  He does have chronic left lower extremity weakness and numbness secondary to widespread lumbar degenerative disc disease.    No evidence of  acute surgical emergency today.    I am going to place him on a short course of corticosteroids and some pain medication for the next 3 days to try to help settle this episode. [TC]      ED Course User Index  [TC] Tomás Jurado MD     Orders placed during this visit:  Orders Placed This Encounter   Procedures    XR Tibia Fibula 2 View Left    XR Ankle 3+ View Left    XR Pelvis 1 or 2 View    XR Knee 1 or 2 View Left    Urinalysis With Culture If Indicated -    Apply ace wrap       Medications   HYDROcodone-acetaminophen (NORCO) 7.5-325 MG per tablet 1 tablet (1 tablet Oral Given 8/1/24 2019)   dexAMETHasone sodium phosphate injection 10 mg (10 mg Intramuscular Given 8/1/24 2158)   fentaNYL citrate (PF) (SUBLIMAZE) injection 75 mcg (75 mcg Intramuscular Given 8/1/24 2156)            Medical Decision Making  Problems Addressed:  Lumbar radiculopathy: complicated acute illness or injury    Amount and/or Complexity of Data Reviewed  Radiology: ordered.    Risk  Prescription drug management.            DIAGNOSIS  Final diagnoses:   Lumbar radiculopathy   Fall, initial encounter   Contusion of left knee, initial encounter   Contusion of left ankle, initial encounter          Medication List        New Prescriptions      predniSONE 20 MG tablet  Commonly known as: DELTASONE  2 po daily x 3d, then 1 po daily x 3d.            Changed      aspirin 325 MG EC tablet  Take 1 tablet by mouth 2 (Two) Times a Day With Meals.  What changed: how much to take     * HYDROcodone-acetaminophen  MG per tablet  Commonly known as: NORCO  What changed: Another medication with the same name was added. Make sure you understand how and when to take each.     * HYDROcodone-acetaminophen 5-325 MG per tablet  Commonly known as: NORCO  Take 1 tablet by mouth Every 6 (Six) Hours As Needed for Moderate Pain for up to 3 days.  What changed: You were already taking a medication with the same name, and this prescription was added. Make sure  you understand how and when to take each.           * This list has 2 medication(s) that are the same as other medications prescribed for you. Read the directions carefully, and ask your doctor or other care provider to review them with you.                   Where to Get Your Medications        These medications were sent to [facility] [id number] - [place], [place] - [address] [place] [address] AT [facility] [place] - [phone]  - [phone] FX  [address] [facility], [facility] [address]      Phone: [phone]   HYDROcodone-acetaminophen 5-325 MG per tablet  predniSONE 20 MG tablet         FOLLOW-UP  Bonifacio Dodd MD  [address]  [place] [id number] [phone]      5-7 days as needed        Latest Documented Vital Signs:  As of 02:21 EDT  BP- 136/61 HR- 77 Temp- 98.5 °F (36.9 °C) (Oral) O2 sat- 96%    Appropriate PPE utilized throughout this patient encounter to include mask, if indicated, per current protocol. Hand hygiene was performed before donning PPE and after removal when leaving the room.    Please note that portions of this were completed with a voice recognition program.     Note Disclaimer: At [facility], we believe that sharing information builds trust and better relationships. You are receiving this note because you are receiving care at [facility] or recently visited. It is possible you will see health information before a provider has talked with you about it. This kind of information can be easy to misunderstand. To help you fully understand what it means for your health, we urge you to discuss this note with your provider.

## 2024-08-12 ENCOUNTER — APPOINTMENT (OUTPATIENT)
Dept: GENERAL RADIOLOGY | Facility: HOSPITAL | Age: 69
End: 2024-08-12
Payer: MEDICARE

## 2024-08-12 ENCOUNTER — HOSPITAL ENCOUNTER (OUTPATIENT)
Facility: HOSPITAL | Age: 69
Discharge: HOME | End: 2024-08-12
Attending: EMERGENCY MEDICINE | Admitting: EMERGENCY MEDICINE
Payer: MEDICARE

## 2024-08-12 VITALS
BODY MASS INDEX: 45 KG/M2 | RESPIRATION RATE: 18 BRPM | HEART RATE: 84 BPM | DIASTOLIC BLOOD PRESSURE: 77 MMHG | OXYGEN SATURATION: 98 % | HEIGHT: 66 IN | TEMPERATURE: 98.4 F | WEIGHT: 280 LBS | SYSTOLIC BLOOD PRESSURE: 123 MMHG

## 2024-08-12 DIAGNOSIS — S71.112A LACERATION OF LEFT THIGH, INITIAL ENCOUNTER: Primary | ICD-10-CM

## 2024-08-12 PROCEDURE — 73560 X-RAY EXAM OF KNEE 1 OR 2: CPT

## 2024-08-12 PROCEDURE — 25010000002 TETANUS-DIPHTH-ACELL PERTUSSIS 5-2.5-18.5 LF-MCG/0.5 SUSPENSION PREFILLED SYRINGE: Performed by: NURSE PRACTITIONER

## 2024-08-12 PROCEDURE — 12002 RPR S/N/AX/GEN/TRNK2.6-7.5CM: CPT | Performed by: NURSE PRACTITIONER

## 2024-08-12 PROCEDURE — G0463 HOSPITAL OUTPT CLINIC VISIT: HCPCS | Performed by: NURSE PRACTITIONER

## 2024-08-12 PROCEDURE — 99213 OFFICE O/P EST LOW 20 MIN: CPT | Performed by: NURSE PRACTITIONER

## 2024-08-12 PROCEDURE — 90471 IMMUNIZATION ADMIN: CPT | Performed by: NURSE PRACTITIONER

## 2024-08-12 PROCEDURE — 90715 TDAP VACCINE 7 YRS/> IM: CPT | Performed by: NURSE PRACTITIONER

## 2024-08-12 RX ORDER — TRIAMTERENE AND HYDROCHLOROTHIAZIDE 37.5; 25 MG/1; MG/1
1 CAPSULE ORAL EVERY MORNING
Qty: 30 CAPSULE | Refills: 2 | Status: SHIPPED | OUTPATIENT
Start: 2024-08-12

## 2024-08-12 RX ORDER — LIDOCAINE HYDROCHLORIDE AND EPINEPHRINE 10; 10 MG/ML; UG/ML
10 INJECTION, SOLUTION INFILTRATION; PERINEURAL ONCE
Status: COMPLETED | OUTPATIENT
Start: 2024-08-12 | End: 2024-08-12

## 2024-08-12 RX ORDER — HYDROCODONE BITARTRATE AND ACETAMINOPHEN 5; 325 MG/1; MG/1
2 TABLET ORAL ONCE
Status: COMPLETED | OUTPATIENT
Start: 2024-08-12 | End: 2024-08-12

## 2024-08-12 RX ADMIN — TETANUS TOXOID, REDUCED DIPHTHERIA TOXOID AND ACELLULAR PERTUSSIS VACCINE, ADSORBED 0.5 ML: 5; 2.5; 8; 8; 2.5 SUSPENSION INTRAMUSCULAR at 18:45

## 2024-08-12 RX ADMIN — LIDOCAINE HYDROCHLORIDE,EPINEPHRINE BITARTRATE 10 ML: 10; .01 INJECTION, SOLUTION INFILTRATION; PERINEURAL at 18:47

## 2024-08-12 RX ADMIN — HYDROCODONE BITARTRATE AND ACETAMINOPHEN 2 TABLET: 5; 325 TABLET ORAL at 20:59

## 2024-08-12 NOTE — FSED PROVIDER NOTE
EMERGENCY DEPARTMENT ENCOUNTER    Room Number:  12/12  Date seen:  8/13/2024  Time seen: 18:33 EDT  PCP: Bonifacio Dodd MD  Historian: patient    Discussed/obtained information from independent historians: n/a    HPI:  Chief complaint:left leg laceration, above knee  A complete HPI/ROS/PMH/PSH/SH/FH are unobtainable due to: n/a  Context:Armando Dixon Jr. is a 69 y.o. male with past medical history of hypertension, hyperlipidemia, CAD, who presents to the ED with c/o mechanical fall today sustaining a left distal femur area laceration.  Seen at  and sent here for repair.  Denies episodic lightheadedness dizziness or anything prior to the fall and states the fall was more because he was in a hurry.  He is able to ambulate.  He is status post left total knee replacement December 2023 and has been healing well.  Unsure of last Tdap.     ALLERGIES  Patient has no known allergies.    PAST MEDICAL HISTORY  Active Ambulatory Problems     Diagnosis Date Noted    Essential hypertension 07/05/2018    Class 1 obesity due to excess calories without serious comorbidity with body mass index (BMI) of 30.0 to 30.9 in adult 07/05/2018    Mixed hyperlipidemia 12/24/2019    Abnormal cardiac function test 02/24/2020    Coronary artery disease involving native coronary artery of native heart without angina pectoris 03/10/2020    Abnormal electrocardiogram 05/23/2023    Preoperative examination, unspecified 05/23/2023    Primary osteoarthritis of right knee 06/06/2023    Primary osteoarthritis of left knee 12/26/2023     Resolved Ambulatory Problems     Diagnosis Date Noted    No Resolved Ambulatory Problems     Past Medical History:   Diagnosis Date    Allergic     Anxiety     Arthritis     Asthma     Coronary artery disease     DDD (degenerative disc disease), cervical     Deep vein thrombosis     Depression     Esophagitis with gastritis     GERD (gastroesophageal reflux disease)     History of kidney stones      Hyperlipidemia     Hypertension     Incisional hernia     Left knee pain     Numbness     ENRRIQUE on CPAP     Osteoarthritis of left knee     Osteoporosis     Prediabetes     Prostatitis     Pulmonary embolism     Urgency incontinence        PAST SURGICAL HISTORY  Past Surgical History:   Procedure Laterality Date    CARDIAC CATHETERIZATION      CARDIAC CATHETERIZATION N/A 02/28/2020    Procedure: Left Heart Cath;  Surgeon: Minerva Adams MD;  Location:  [facility];  Service: Cardiology;  Laterality: N/A;    CARDIAC CATHETERIZATION N/A 02/28/2020    Procedure: Coronary angiography;  Surgeon: Minerva Adams MD;  Location:  [facility];  Service: Cardiology;  Laterality: N/A;    CARDIAC CATHETERIZATION N/A 02/28/2020    Procedure: Left ventriculography;  Surgeon: Minerva Adams MD;  Location:  [facility];  Service: Cardiology;  Laterality: N/A;    CARDIAC CATHETERIZATION N/A 02/28/2020    Procedure: Percutaneous Coronary Intervention;  Surgeon: Minerva Adams MD;  [facility];  Service: Cardiology;  Laterality: N/A;    CARDIAC CATHETERIZATION N/A 02/28/2020    Procedure: Stent FADY coronary;  Surgeon: Minerva Adams MD;  Location:  [facility];  Service: Cardiology;  Laterality: N/A;    CARPAL TUNNEL RELEASE Bilateral     CHOLECYSTECTOMY      COLONOSCOPY  10/06/2014    [facility]    ENDOSCOPY  08/29/2014    Severe esophagitis, gastritis, neg for H pylori, Walters's, and fungi    GASTRIC BYPASS      INCISIONAL HERNIA REPAIR      TOTAL HIP ARTHROPLASTY Bilateral     TOTAL KNEE ARTHROPLASTY Right 6/6/2023    Procedure: TOTAL KNEE ARTHROPLASTY;  Surgeon: Bonifacio Dwyer MD;  Location: [facility] [place];  Service: Orthopedics;  Laterality: Right;    TOTAL KNEE ARTHROPLASTY Left 12/26/2023    Procedure: TOTAL KNEE ARTHROPLASTY;  Surgeon: Bonifacio Dwyer MD;  Location: [facility] [place];  Service:  Orthopedics;  Laterality: Left;    VENA CAVA FILTER INSERTION         FAMILY HISTORY  Family History   Problem Relation Age of Onset    Hypertension Mother     Arrhythmia Mother     Hyperlipidemia Neg Hx     Heart disease Neg Hx     Heart failure Neg Hx     Heart attack Neg Hx     Malig Hyperthermia Neg Hx        SOCIAL HISTORY  Social History     Socioeconomic History    Marital status:    Tobacco Use    Smoking status: Never    Smokeless tobacco: Never   Vaping Use    Vaping status: Never Used   Substance and Sexual Activity    Alcohol use: Not Currently     Comment: RARELY    Drug use: No    Sexual activity: Defer       REVIEW OF SYSTEMS  Review of Systems    All systems reviewed and negative except for those discussed in HPI.     PHYSICAL EXAM    I have reviewed the triage vital signs and nursing notes.  Vitals:    08/12/24 1829   BP: 123/77   Pulse: 84   Resp: 18   Temp: 98.4 °F (36.9 °C)   SpO2: 98%     Physical Exam    GENERAL: not distressed  HENT: nares patent  EYES: no scleral icterus  NECK: no ROM limitations  CV: regular rhythm, regular rate  RESPIRATORY: normal effort  ABDOMEN: soft  : deferred  MUSCULOSKELETAL: no bony deformity to left knee.  He can flex and extend the knee.  There is a distal femur laceration measuring approx 3 cm.  Compartments soft  NEURO: alert, moves all extremities, follows commands  SKIN: warm, dry    Laceration Repair    Date/Time: 8/12/2024 6:44 PM    Performed by: Guadalupe Chan APRN  Authorized by: Quinten Celaya MD    Consent:     Consent obtained:  Verbal    Consent given by:  Patient    Risks, benefits, and alternatives were discussed: yes      Risks discussed:  Infection, pain, poor cosmetic result and poor wound healing    Alternatives discussed:  No treatment  Universal protocol:     Patient identity confirmed:  Verbally with patient and arm band  Anesthesia:     Anesthesia method:  Local infiltration    Local anesthetic:  Lidocaine 1% WITH  epi  Laceration details:     Location:  Leg    Leg location:  L upper leg (distal femur area)    Length (cm):  3  Pre-procedure details:     Preparation:  Patient was prepped and draped in usual sterile fashion and imaging obtained to evaluate for foreign bodies  Exploration:     Limited defect created (wound extended): no      Hemostasis achieved with:  Direct pressure    Imaging obtained: x-ray      Imaging outcome: foreign body not noted      Wound exploration: wound explored through full range of motion and entire depth of wound visualized      Wound extent: no foreign bodies/material noted, no muscle damage noted, no nerve damage noted, no tendon damage noted, no underlying fracture noted and no vascular damage noted      Contaminated: no    Treatment:     Area cleansed with:  Shur-Clens    Amount of cleaning:  Extensive    Visualized foreign bodies/material removed: no      Debridement:  None    Undermining:  None    Scar revision: no    Skin repair:     Repair method:  Sutures    Suture size:  3-0    Suture material:  Prolene    Suture technique:  Horizontal mattress    Number of sutures:  5  Approximation:     Approximation:  Close  Repair type:     Repair type:  Simple  Post-procedure details:     Dressing:  Bulky dressing    Procedure completion:  Tolerated      RADIOLOGY RESULTS  XR Knee 1 or 2 View Left    Result Date: 8/12/2024  LEFT KNEE: AP, LATERAL  HISTORY: Distal femoral laceration.  COMPARISON: Left knee x-rays 08/01/2024.  FINDINGS: There has been left total knee arthroplasty with patellar resurfacing. Suprapatellar effusion is demonstrated. There is a small ossification medial to the medial femoral condyle that could represent a small avulsion fragment and was not demonstrated previously. No fracture plane is evident. Small chronic-appearing ossification anterior to the proximal tibia above the tibial tubercle.      1. Knee joint effusion. 2. Small linear ossification medial to the medial  femoral condyle potentially a small avulsion fragment and this is of uncertain age and may be chronic though was not clearly demonstrated on previous exams. No fracture plane is evident in patient with left total knee arthroplasty and patellar resurfacing.  This report was finalized on 8/12/2024 9:15 PM by Johan Temple M.D on Workstation: [id number]        Ordered the above noted radiological studies.  Independently interpreted by me.  My findings will be discussed in the medical decision section below.     PROGRESS, DATA ANALYSIS, CONSULTS AND MEDICAL DECISION MAKING    Please note that this section constitutes my independent interpretation of clinical data including lab results, radiology, EKG's.  This constitutes my independent professional opinion regarding differential diagnosis and management of this patient.  It may include any factors such as history from outside sources, review of external records, social determinants of health, management of medications, response to those treatments, and discussions with other providers.    ED Course as of 08/13/24 0901   Mon Aug 12, 2024   2053 Rechecked patient and I informed him of the x-ray results with the knee joint effusion and a possible avulsion fragment.  He is followed by pain management and advised that he calls his orthopedics doctor tomorrow for close follow-up.  He agrees with the plan of care and all questions addressed at this time. [SS]      ED Course User Index  [SS] Talia Ga PA-C     Orders placed during this visit:  Orders Placed This Encounter   Procedures    Laceration Repair    XR Knee 1 or 2 View Left            Medical Decision Making  Problems Addressed:  Laceration of left thigh, initial encounter, distal thigh above knee: complicated acute illness or injury    Amount and/or Complexity of Data Reviewed  Radiology: ordered.    Risk  Prescription drug management.    Patient presents today with a mechanical fall sustaining a distal  femur area laceration measuring 3 cm.  He was seen in urgent care who recommended he come here due to their worries about closing this.  He did have a recent total knee replacement in December 2023.  On exam he has a normal flexion and extension of the knee.  He can ambulate.  Due to the area of the laceration I did repair this with horizontal sutures pressure dressing and did discuss wound care.  Imaging obtained. Pt ambulates, compartments soft.  Wound care discussed.         DIAGNOSIS  Final diagnoses:   Laceration of left thigh, initial encounter, distal thigh above knee          Medication List        Changed      aspirin 325 MG EC tablet  Take 1 tablet by mouth 2 (Two) Times a Day With Meals.  What changed: how much to take              FOLLOW-UP  Bonifacio Dodd MD  [address] [address]  [phone]    Schedule an appointment as soon as possible for a visit   For suture removal 14-16 days        Latest Documented Vital Signs:  As of 09:01 EDT  BP- 123/77 HR- 84 Temp- 98.4 °F (36.9 °C) (Oral) O2 sat- 98%    Appropriate PPE utilized throughout this patient encounter to include mask, if indicated, per current protocol. Hand hygiene was performed before donning PPE and after removal when leaving the room.    Please note that portions of this were completed with a voice recognition program.     Note Disclaimer: At [facility], we believe that sharing information builds trust and better relationships. You are receiving this note because you are receiving care at [facility] or recently visited. It is possible you will see health information before a provider has talked with you about it. This kind of information can be easy to misunderstand. To help you fully understand what it means for your health, we urge you to discuss this note with your provider.

## 2024-08-12 NOTE — DISCHARGE INSTRUCTIONS
Laceration care:  1.  Keep initial dressing in place for 24 hours if able.  (if blood seeps through, then remove this dressing, wash gently with antibacterial soap and pat dry)    2.  After initial 24 hours wash the wound twice a day with antibacterial soap and apply thin film of over the counter triple antibiotic ointment (bacitracin or neosporin ointment)    3.  Cover with bandaid while at work    4.  Sutures out in 14-16 days.    Wear an ace wrap to provide compression to the area and to serve as a reminder not to bend knee too much.    Return Precautions    Although you are being discharged from the ED today, I encourage you to return for worsening symptoms.  Things can, and do, change such that treatment at home with medication may not be adequate.      Specifically, return for any of the following:    Chest pain, shortness of breath, pain or nausea and vomiting not controlled by medications provided.    Please make a follow up with your Primary Care Provider for a blood pressure recheck.

## 2024-09-16 RX ORDER — ATORVASTATIN CALCIUM 20 MG/1
20 TABLET, FILM COATED ORAL DAILY
Qty: 90 TABLET | Refills: 1 | Status: SHIPPED | OUTPATIENT
Start: 2024-09-16

## 2024-09-16 RX ORDER — METOPROLOL TARTRATE 25 MG/1
TABLET, FILM COATED ORAL
Qty: 90 TABLET | Refills: 1 | Status: SHIPPED | OUTPATIENT
Start: 2024-09-16

## 2025-02-05 ENCOUNTER — OFFICE VISIT (OUTPATIENT)
Dept: CARDIOLOGY | Facility: CLINIC | Age: 70
End: 2025-02-05
Payer: MEDICARE

## 2025-02-05 VITALS
WEIGHT: 256 LBS | DIASTOLIC BLOOD PRESSURE: 73 MMHG | SYSTOLIC BLOOD PRESSURE: 119 MMHG | BODY MASS INDEX: 41.14 KG/M2 | HEART RATE: 63 BPM | HEIGHT: 66 IN

## 2025-02-05 DIAGNOSIS — I25.10 CORONARY ARTERY DISEASE INVOLVING NATIVE CORONARY ARTERY OF NATIVE HEART WITHOUT ANGINA PECTORIS: Primary | ICD-10-CM

## 2025-02-05 DIAGNOSIS — Z01.810 PREOPERATIVE CARDIOVASCULAR EXAMINATION: ICD-10-CM

## 2025-02-05 DIAGNOSIS — E78.2 MIXED HYPERLIPIDEMIA: ICD-10-CM

## 2025-02-05 DIAGNOSIS — I10 ESSENTIAL HYPERTENSION: ICD-10-CM

## 2025-02-05 DIAGNOSIS — G47.33 OBSTRUCTIVE SLEEP APNEA SYNDROME: ICD-10-CM

## 2025-02-05 RX ORDER — AMOXICILLIN 500 MG/1
CAPSULE ORAL
COMMUNITY
Start: 2025-02-02

## 2025-02-05 RX ORDER — HYDROCHLOROTHIAZIDE 12.5 MG/1
TABLET ORAL
COMMUNITY
Start: 2024-10-08 | End: 2025-02-05 | Stop reason: SDUPTHER

## 2025-02-05 RX ORDER — DICLOFENAC SODIUM 75 MG/1
1 TABLET, DELAYED RELEASE ORAL 2 TIMES DAILY
COMMUNITY
Start: 2024-08-12

## 2025-02-05 RX ORDER — HYDROCHLOROTHIAZIDE 12.5 MG/1
12.5 TABLET ORAL DAILY
Qty: 30 TABLET | Refills: 12 | Status: SHIPPED | OUTPATIENT
Start: 2025-02-05

## 2025-02-05 RX ORDER — ALBUTEROL SULFATE 90 UG/1
AEROSOL, METERED RESPIRATORY (INHALATION)
COMMUNITY
Start: 2024-11-12

## 2025-02-05 NOTE — PROGRESS NOTES
Subjective:        Armando Dixon Jr. is a 69 y.o. male who here for follow up    Chief Complaint   Patient presents with    Follow-up     6[facility]-ALSO SURGERY CLR        HPI    Armando Dixon is a 69-year-old male who is well-known to this provider.  He has a history to include ENRRIQUE, CAD with stents, obesity, hyperlipidemia, Vena cava filter at [facility], and hypertension.  He is here today to for a surgery clearance and hypertension.  EKG shows sinus rhythm with heart rate of 60.    5/25/23 echo: EF 66 to 70% and normal LV function.    5/25/2023 stress test: Myocard perfusion imaging indicated a normal Myocardial perfusion study with no evidence of ischemia.       2020 cardiac cath: Successful right and left coronary angiogram and LV gram.  Successful angioplasty and stent to the mid 90% x 2 lesions in the diagonal branch ostial 80% reduced to 0% with 2.5/33 and 2.75/8 Xience stent to LAD, diagonal branch was jailed but was dilated with 2.5/12 trek balloon.  Normal left main.  LAD long diffuse disease with a midportion 90% stenosis reduced to 0%.  Ostial diagonal branch jailed and was dilated with a 2.5/12 trek balloon.  Circumflex artery nondominant with OM ostial 60% stenosis.  RCA dominant posterior lateral branch 50% stenosis.      The following portions of the patient's history were reviewed and updated as appropriate: allergies, current medications, past family history, past medical history, past social history, past surgical history and problem list.    Past Medical History:   Diagnosis Date    Allergic     Anxiety     Arthritis     Asthma     CHILDOOD    Coronary artery disease     STENT X 2    DDD (degenerative disc disease), cervical     LUMBAR, THORACIC, SACRAL    Deep vein thrombosis     Depression     Esophagitis with gastritis     GERD (gastroesophageal reflux disease)     History of kidney stones     Hyperlipidemia     Hypertension     Incisional hernia     Left knee pain     Numbness     LEFT LATERAL  THIGH S/P HIP ARTHROPLASTY/MEDIAL PAIN    ENRRIQUE on CPAP     Osteoarthritis of left knee     Osteoporosis     Prediabetes     Prostatitis     Pulmonary embolism     Urgency incontinence          reports that he has never smoked. He has never used smokeless tobacco. He reports that he does not currently use alcohol. He reports that he does not use drugs.     Family History   Problem Relation Age of Onset    Hypertension Mother     Arrhythmia Mother     Hyperlipidemia Neg Hx     Heart disease Neg Hx     Heart failure Neg Hx     Heart attack Neg Hx     Malig Hyperthermia Neg Hx        ROS     Review of Systems  Constitutional: No wt loss, fever, fatigue  Gastrointestinal: No nausea, abdominal pain  Behavioral/Psych: No insomnia or anxiety  Cardiovascular no cp or shob      Objective:           Vitals and nursing note reviewed.   Constitutional:       Appearance: Well-developed.   HENT:      Head: Normocephalic.      Right Ear: External ear normal.      Left Ear: External ear normal.   Neck:      Vascular: No JVD.   Pulmonary:      Effort: Pulmonary effort is normal. No respiratory distress.      Breath sounds: Normal breath sounds. No stridor. No rales.   Cardiovascular:      Normal rate. Regular rhythm.      No gallop.    Pulses:     Intact distal pulses.   Edema:     Peripheral edema absent.   Abdominal:      General: Bowel sounds are normal. There is no distension.      Palpations: Abdomen is soft.      Tenderness: There is no abdominal tenderness. There is no guarding.   Musculoskeletal: Normal range of motion.         General: No tenderness.      Cervical back: Normal range of motion. Skin:     General: Skin is warm.   Neurological:      Mental Status: Alert and oriented to person, place, and time.      Deep Tendon Reflexes: Reflexes are normal and symmetric.   Psychiatric:         Judgment: Judgment normal.           ECG 12 Lead    Date/Time: 2/5/2025 10:40 AM  Performed by: Licha Kirby APRN Authorized  by: Licha Kirby APRN  Comparison: compared with previous ECG from 5/24/2024  Rhythm: sinus rhythm    Clinical impression: non-specific ECG              5/25/23    Interpretation Summary         Left ventricular ejection fraction appears to be 66 - 70%.    Left ventricular diastolic function was normal.    5/25/2023      Interpretation Summary         Findings consistent with a normal ECG stress test.    Left ventricular ejection fraction is normal (Calculated EF = 60%).    Myocardial perfusion imaging indicates a normal myocardial perfusion study with no evidence of ischemia.    Impressions are consistent with a low risk study.     Asymptomatic for chest pain. ECG is negative for ischemia.   Ectopy: none  B/P: Appropriate for Beta-blocker therapy Baseline 117/53, Peak (post Lexiscan) 98/40, Recovery:109//38     Pharmacologic study due to inability to tolerate increasing speed and grade of treadmill due to orthopedic, mobility  issues and Beta-blocker therapy.  Unable to participate in low level exercise due to unsteady gait, poor balance and poor mobility. Uses cane for ambulation.   Supervised by: Dr. Adams    2020  Conclusion       Successful right and left coronary angiogram and LV gram  Successful angioplasty and stent to the mid 90% x 2 lesions and the diagonal branch ostial 80% reduced to 0% with 2.5/33 and 2.75/8 Xience stent in LAD, diagonal branch was jailed but was dilated with 2.5/12 trek balloon  Normal left main  Left anterior descending long diffuse disease with a midportion consequence 90% stenosis reduced to 0% with 2.5/33 and 2.75/8 Xience stent  Ostial diagonal branch jailed and was dilated with a 2.5/12 trek balloon  Circumflex artery nondominant with OM ostial 60% stenosis  Right coronary artery dominant posterolateral branch 50% stenosis  Normal LV gram      Current Outpatient Medications:     ALBUTEROL IN, Inhale As Needed (WHEEZING)., Disp: , Rfl:     alfuzosin  (UROXATRAL) 10 MG 24 hr tablet, Take 1 tablet by mouth Daily., Disp: , Rfl:     amoxicillin (AMOXIL) 500 MG capsule, , Disp: , Rfl:     atorvastatin (LIPITOR) 20 MG tablet, TAKE 1 TABLET BY MOUTH DAILY, Disp: 90 tablet, Rfl: 1    calcium carbonate (TUMS) 500 MG chewable tablet, Chew 1 tablet Daily., Disp: , Rfl:     diclofenac (VOLTAREN) 75 MG EC tablet, Take 1 tablet by mouth 2 (Two) Times a Day., Disp: , Rfl:     doxepin (SINEquan) 25 MG capsule, Take 2 capsules by mouth Every Night., Disp: , Rfl:     DULoxetine (CYMBALTA) 30 MG capsule, Take 1 capsule by mouth 3 times a day., Disp: , Rfl:     gabapentin (NEURONTIN) 300 MG capsule, 3 capsules Every Morning.  CAPSULES AT BEDTIME, Disp: , Rfl:     hydroCHLOROthiazide 12.5 MG oral, , Disp: , Rfl:     HYDROcodone-acetaminophen (NORCO)  MG per tablet, , Disp: , Rfl:     lamoTRIgine (LaMICtal) 150 MG tablet, Take 2 tablets by mouth Every Night., Disp: , Rfl:     metoprolol tartrate (LOPRESSOR) 25 MG tablet, TAKE 1/2 TABLET BY MOUTH TWICE A DAY, Disp: 90 tablet, Rfl: 1    montelukast (SINGULAIR) 10 MG tablet, Take 1 tablet by mouth Every Night., Disp: , Rfl:     nitroglycerin (NITROSTAT) 0.4 MG SL tablet, 1 under the tongue as needed for angina, may repeat q5mins for up three doses, Disp: 25 tablet, Rfl: 3    omeprazole (priLOSEC) 40 MG capsule, Take 1 capsule by mouth 2 (Two) Times a Day., Disp: , Rfl:     Ventolin  (90 Base) MCG/ACT inhaler, , Disp: , Rfl:     aspirin 325 MG EC tablet, Take 1 tablet by mouth 2 (Two) Times a Day With Meals. (Patient taking differently: Take 81 mg by mouth 2 (Two) Times a Day With Meals. Indications: VTE Prophylaxis), Disp: 60 tablet, Rfl: 0     Assessment:        Patient Active Problem List   Diagnosis    Essential hypertension    Class 1 obesity due to excess calories without serious comorbidity with body mass index (BMI) of 30.0 to 30.9 in adult    Mixed hyperlipidemia    Abnormal cardiac function test    Coronary  artery disease involving native coronary artery of native heart without angina pectoris    Abnormal electrocardiogram    Preoperative examination, unspecified    Primary osteoarthritis of right knee    Primary osteoarthritis of left knee               Plan:   1.  Cardiac clearance known CAD: Denies any chest pain.  He will need a stress mariya and echo.      It was explained to the patient that stress testing carries 85% specificity/sensitivity, and does not rule out future cardiac event.  Risks of the procedure were explained to the patient including shortness of breath, induction of myocardial infarction, and dizziness.  Patient is agreeable to proceeding with stress testing.     Risk reduction for the coronary artery disease, controlling the blood pressure, blood sugar management, cholesterol management, exercise, stress management, and proper compliance with medications and follow-up has been discussed    2.  Hypertension: Blood pressure stable on current medication.  Tinea hctz, and BB.    Educated patient on exercising for at least 30 minutes a day for 2 to 3 days a week. Importance of controlling hypertension and blood pressure checkup on the regular basis has been explained. Hypertension as a silent killer has been discussed. Risk reduction of the weight and regular exercises to control the hypertension has been explained.    2.  Hyperlipidemia: On statin.  His PCP manages his cholesterol and labs.  Risk of the hyperlipidemia, importance of the treatment has been explained. Pros and cons of the statins has been explained. Regular blood workup as well as side effects including the liver failure, myelopathy death has been explained.    4.  Obesity: BMI 41.32    5.  ENRRIQUE    6. Dependent edema: wear stokings and decrease sodium.             No diagnosis found.    There are no diagnoses linked to this encounter.    COUNSELING:ventura Dixon Jr.Counseling was given to patient for the following topics:  diagnostic results, risk factor reductions, impressions, risks and benefits of treatment options and importance of treatment compliance .       SMOKING COUNSELING: denies    -Echo and lexiscan. He uses a walker.   - refill hctz    Sincerely,   EDU Maldonado  [facility]  02/05/25  10:35 EST    EMR Dragon/Transcription disclaimer: Dictated utilizing Dragon Dictation

## 2025-02-26 ENCOUNTER — HOSPITAL ENCOUNTER (OUTPATIENT)
Dept: CARDIOLOGY | Facility: HOSPITAL | Age: 70
Discharge: HOME | End: 2025-02-26
Payer: MEDICARE

## 2025-02-26 ENCOUNTER — HOSPITAL ENCOUNTER (OUTPATIENT)
Dept: CARDIOLOGY | Facility: HOSPITAL | Age: 70
Discharge: HOME | End: 2025-02-26
Admitting: NURSE PRACTITIONER
Payer: MEDICARE

## 2025-02-26 VITALS
BODY MASS INDEX: 41.1 KG/M2 | HEIGHT: 66 IN | WEIGHT: 255.73 LBS | SYSTOLIC BLOOD PRESSURE: 150 MMHG | HEART RATE: 60 BPM | DIASTOLIC BLOOD PRESSURE: 72 MMHG

## 2025-02-26 LAB
AORTIC DIMENSIONLESS INDEX: 0.8 (DI)
AV MEAN PRESS GRAD SYS DOP V1V2: 5.5 MMHG
AV VMAX SYS DOP: 149.3 CM/SEC
BH CV ECHO MEAS - AO MAX PG: 8.9 MMHG
BH CV ECHO MEAS - AO ROOT DIAM: 3.1 CM
BH CV ECHO MEAS - AO V2 VTI: 38.6 CM
BH CV ECHO MEAS - AVA(I,D): 2.45 CM2
BH CV ECHO MEAS - EDV(CUBED): 117.7 ML
BH CV ECHO MEAS - EDV(MOD-SP2): 145 ML
BH CV ECHO MEAS - EDV(MOD-SP4): 170 ML
BH CV ECHO MEAS - EF(MOD-SP2): 62.8 %
BH CV ECHO MEAS - EF(MOD-SP4): 59.4 %
BH CV ECHO MEAS - ESV(CUBED): 38.6 ML
BH CV ECHO MEAS - ESV(MOD-SP2): 54 ML
BH CV ECHO MEAS - ESV(MOD-SP4): 69 ML
BH CV ECHO MEAS - FS: 31 %
BH CV ECHO MEAS - IVS/LVPW: 1.03 CM
BH CV ECHO MEAS - IVSD: 1.32 CM
BH CV ECHO MEAS - LAT PEAK E' VEL: 9.5 CM/SEC
BH CV ECHO MEAS - LV MASS(C)D: 254.4 GRAMS
BH CV ECHO MEAS - LV MAX PG: 4.9 MMHG
BH CV ECHO MEAS - LV MEAN PG: 2.8 MMHG
BH CV ECHO MEAS - LV V1 MAX: 111 CM/SEC
BH CV ECHO MEAS - LV V1 VTI: 31.1 CM
BH CV ECHO MEAS - LVIDD: 4.9 CM
BH CV ECHO MEAS - LVIDS: 3.4 CM
BH CV ECHO MEAS - LVOT AREA: 3 CM2
BH CV ECHO MEAS - LVOT DIAM: 1.96 CM
BH CV ECHO MEAS - LVPWD: 1.28 CM
BH CV ECHO MEAS - MED PEAK E' VEL: 7.2 CM/SEC
BH CV ECHO MEAS - MV A DUR: 0.15 SEC
BH CV ECHO MEAS - MV A MAX VEL: 73.3 CM/SEC
BH CV ECHO MEAS - MV DEC SLOPE: 351 CM/SEC2
BH CV ECHO MEAS - MV DEC TIME: 0.19 SEC
BH CV ECHO MEAS - MV E MAX VEL: 54.7 CM/SEC
BH CV ECHO MEAS - MV E/A: 0.75
BH CV ECHO MEAS - MV MAX PG: 5.3 MMHG
BH CV ECHO MEAS - MV MEAN PG: 1.75 MMHG
BH CV ECHO MEAS - MV P1/2T: 94.7 MSEC
BH CV ECHO MEAS - MV V2 VTI: 39.3 CM
BH CV ECHO MEAS - MVA(P1/2T): 2.32 CM2
BH CV ECHO MEAS - MVA(VTI): 2.4 CM2
BH CV ECHO MEAS - PA ACC TIME: 0.1 SEC
BH CV ECHO MEAS - PA V2 MAX: 72.7 CM/SEC
BH CV ECHO MEAS - PULM A REVS DUR: 0.15 SEC
BH CV ECHO MEAS - PULM A REVS VEL: 28.1 CM/SEC
BH CV ECHO MEAS - PULM DIAS VEL: 33.8 CM/SEC
BH CV ECHO MEAS - PULM S/D: 0.66
BH CV ECHO MEAS - PULM SYS VEL: 22.4 CM/SEC
BH CV ECHO MEAS - RAP SYSTOLE: 8 MMHG
BH CV ECHO MEAS - RV MAX PG: 1.67 MMHG
BH CV ECHO MEAS - RV V1 MAX: 64.5 CM/SEC
BH CV ECHO MEAS - RV V1 VTI: 15 CM
BH CV ECHO MEAS - RVSP: 11 MMHG
BH CV ECHO MEAS - SV(LVOT): 94.4 ML
BH CV ECHO MEAS - SV(MOD-SP2): 91 ML
BH CV ECHO MEAS - SV(MOD-SP4): 101 ML
BH CV ECHO MEAS - TAPSE (>1.6): 2.38 CM
BH CV ECHO MEAS - TR MAX PG: 3 MMHG
BH CV ECHO MEAS - TR MAX VEL: 87.3 CM/SEC
BH CV ECHO MEASUREMENTS AVERAGE E/E' RATIO: 6.55
BH CV XLRA - RV BASE: 3.4 CM
BH CV XLRA - TDI S': 10.8 CM/SEC
LEFT ATRIUM VOLUME INDEX: 29.5 ML/M2
LV EF BIPLANE MOD: 62.4 %
SINUS: 2.7 CM

## 2025-02-26 PROCEDURE — A9500 TC99M SESTAMIBI: HCPCS | Performed by: INTERNAL MEDICINE

## 2025-02-26 PROCEDURE — 93306 TTE W/DOPPLER COMPLETE: CPT

## 2025-02-26 PROCEDURE — 78452 HT MUSCLE IMAGE SPECT MULT: CPT

## 2025-02-26 PROCEDURE — 25510000001 PERFLUTREN 6.52 MG/ML SUSPENSION 2 ML VIAL: Performed by: NURSE PRACTITIONER

## 2025-02-26 PROCEDURE — 34310000005 TECHNETIUM SESTAMIBI: Performed by: INTERNAL MEDICINE

## 2025-02-26 PROCEDURE — 93017 CV STRESS TEST TRACING ONLY: CPT

## 2025-02-26 PROCEDURE — 25010000002 REGADENOSON 0.4 MG/5ML SOLUTION: Performed by: INTERNAL MEDICINE

## 2025-02-26 RX ORDER — REGADENOSON 0.08 MG/ML
0.4 INJECTION, SOLUTION INTRAVENOUS
Status: COMPLETED | OUTPATIENT
Start: 2025-02-26 | End: 2025-02-26

## 2025-02-26 RX ADMIN — SODIUM CHLORIDE 2 ML: 9 INJECTION INTRAMUSCULAR; INTRAVENOUS; SUBCUTANEOUS at 09:05

## 2025-02-26 RX ADMIN — REGADENOSON 0.4 MG: 0.08 INJECTION, SOLUTION INTRAVENOUS at 10:00

## 2025-02-26 RX ADMIN — TECHNETIUM TC 99M SESTAMIBI 1 DOSE: 1 INJECTION INTRAVENOUS at 10:00

## 2025-02-26 RX ADMIN — TECHNETIUM TC 99M SESTAMIBI 1 DOSE: 1 INJECTION INTRAVENOUS at 08:08

## 2025-02-27 LAB
BH CV REST NUCLEAR ISOTOPE DOSE: 10.9 MCI
BH CV STRESS BP STAGE 1: NORMAL
BH CV STRESS COMMENTS STAGE 1: NORMAL
BH CV STRESS DOSE REGADENOSON STAGE 1: 0.4
BH CV STRESS DURATION MIN STAGE 1: 0
BH CV STRESS DURATION SEC STAGE 1: 10
BH CV STRESS HR STAGE 1: 66
BH CV STRESS NUCLEAR ISOTOPE DOSE: 32.4 MCI
BH CV STRESS PROTOCOL 1: NORMAL
BH CV STRESS RECOVERY BP: NORMAL MMHG
BH CV STRESS RECOVERY HR: 72 BPM
BH CV STRESS STAGE 1: 1
MAXIMAL PREDICTED HEART RATE: 152 BPM
PERCENT MAX PREDICTED HR: 54.61 %
SPECT HRT GATED+EF W RNC IV: 62 %
STRESS BASELINE BP: NORMAL MMHG
STRESS BASELINE HR: 54 BPM
STRESS O2 SAT REST: 100 %
STRESS PERCENT HR: 64 %
STRESS POST ESTIMATED WORKLOAD: 1 METS
STRESS POST EXERCISE DUR MIN: 1 MIN
STRESS POST PEAK BP: NORMAL MMHG
STRESS POST PEAK HR: 83 BPM
STRESS TARGET HR: 129 BPM

## 2025-03-06 NOTE — PROGRESS NOTES
Subjective:        Armando Dixon Jr. is a 69 y.o. male who here for follow up    Chief Complaint   Patient presents with    Follow-up     Test results       HPI:  This is a 69 year old male who is new with this provider with coronary artery disease hx stents, hyperlipidemia, hypertension, seen in office today in follow up for cardiac clearance for cervical spine surgery.     Echo 2/26/25 EF 61-65%, normal LV function     Stress test 2/26/25 normal myocardial perfusion study with no evidence of ischemia.     Cardiac cath 2020 normal left main. LAD long diffuse diease with midportion consequence 90% stenosis reduced to 0% with FADY x2. Ostial diagonal branch jailed and was dilated with trek balloon. Circumflex nondominant with OM ostial 60% stenosis. RCA dominant posterolateral branch 50% stenosis. Normal LV gram.     The following portions of the patient's history were reviewed and updated as appropriate: allergies, current medications, past family history, past medical history, past social history, past surgical history and problem list.    Past Medical History:   Diagnosis Date    Allergic     Anxiety     Arthritis     Asthma     CHILDOOD    Coronary artery disease     STENT X 2    DDD (degenerative disc disease), cervical     LUMBAR, THORACIC, SACRAL    Deep vein thrombosis     Depression     Esophagitis with gastritis     GERD (gastroesophageal reflux disease)     History of kidney stones     Hyperlipidemia     Hypertension     Incisional hernia     Left knee pain     Numbness     LEFT LATERAL THIGH S/P HIP ARTHROPLASTY/MEDIAL PAIN    ENRRIQUE on CPAP     Osteoarthritis of left knee     Osteoporosis     Prediabetes     Prostatitis     Pulmonary embolism     Urgency incontinence          reports that he has never smoked. He has never used smokeless tobacco. He reports that he does not currently use alcohol. He reports that he does not use drugs.     Family History   Problem Relation Age of Onset    Hypertension  Mother     Arrhythmia Mother     Hyperlipidemia Neg Hx     Heart disease Neg Hx     Heart failure Neg Hx     Heart attack Neg Hx     Malig Hyperthermia Neg Hx           Objective:           Vitals and nursing note reviewed.   Constitutional:       Appearance: Well-developed.   HENT:      Head: Normocephalic.      Right Ear: External ear normal.      Left Ear: External ear normal.   Neck:      Vascular: No JVD.   Pulmonary:      Effort: Pulmonary effort is normal. No respiratory distress.      Breath sounds: Normal breath sounds. No stridor. No rales.   Cardiovascular:      Normal rate. Regular rhythm.      No gallop.    Pulses:     Intact distal pulses.   Edema:     Peripheral edema absent.   Abdominal:      General: Bowel sounds are normal. There is no distension.      Palpations: Abdomen is soft.      Tenderness: There is no abdominal tenderness. There is no guarding.   Musculoskeletal: Normal range of motion.         General: No tenderness.      Cervical back: Normal range of motion. Skin:     General: Skin is warm.   Neurological:      Mental Status: Alert and oriented to person, place, and time.      Deep Tendon Reflexes: Reflexes are normal and symmetric.   Psychiatric:         Judgment: Judgment normal.         Procedures    HEMODYNAMIC / ANGIOGRAPHIC DATA:    Left ventricular end diastolic pressure was 10 mmHg.  Left ventriculography revealed an EF around 60%.    The left main is, normal left main.  The left anterior descending artery is , Left anterior descending long diffuse disease with a midportion consequence 90% stenosis reduced to 0% with 2.5/33 and 2.75/8 Xience stent,Ostial diagonal branch jailed and was dilated with a 2.5/12 trek balloon  The left circumflex is .Circumflex artery nondominant with OM ostial 60% stenosis  The right coronary artery is .Right coronary artery dominant posterolateral branch 50% stenosis  Successful angioplasty and stent to the mid 90% x 2 lesions and the diagonal branch  ostial 80% reduced to 0% with 2.5/33 and 2.75/8 Xience stent in LAD, diagonal branch was jailed but was dilated with 2.5/12 trek balloon     YASEMIN FLOW    PRE.... 3...       POST..... 3.     TYPE OF LESION........C.....     RECOMMENDATIONS:  Post-procedure care will focus on prevention of any ischemic events and congestive complications.  Aggressive risk factor modification will be carried out.     Importance of taking dual antiplatelets for one year  has been explained, risk of stent thrombosis leading to the acute MI, which carries high morbidity and mortality has been explained     Discontinuation or interruptions of these medications should be under the strict guidance of appropriate health professional       Interpretation Summary         Findings consistent with a normal ECG stress test.    Myocardial perfusion imaging indicates a normal myocardial perfusion study with no evidence of ischemia. Impressions are consistent with a low risk study.    Left ventricular ejection fraction is normal (Calculated EF = 62%).    Interpretation Summary         Left ventricular ejection fraction appears to be 61 - 65%.    Left ventricular diastolic function was normal.    Estimated right ventricular systolic pressure from tricuspid regurgitation is normal (<35 mmHg).    Current Outpatient Medications:     ALBUTEROL IN, Inhale As Needed (WHEEZING)., Disp: , Rfl:     alfuzosin (UROXATRAL) 10 MG 24 hr tablet, Take 1 tablet by mouth Daily., Disp: , Rfl:     amoxicillin (AMOXIL) 500 MG capsule, , Disp: , Rfl:     atorvastatin (LIPITOR) 20 MG tablet, TAKE 1 TABLET BY MOUTH DAILY, Disp: 90 tablet, Rfl: 1    calcium carbonate (TUMS) 500 MG chewable tablet, Chew 1 tablet Daily., Disp: , Rfl:     diclofenac (VOLTAREN) 75 MG EC tablet, Take 1 tablet by mouth 2 (Two) Times a Day., Disp: , Rfl:     doxepin (SINEquan) 25 MG capsule, Take 2 capsules by mouth Every Night., Disp: , Rfl:     DULoxetine (CYMBALTA) 30 MG capsule, Take 1 capsule  by mouth 3 times a day., Disp: , Rfl:     gabapentin (NEURONTIN) 300 MG capsule, 3 capsules Every Morning.  CAPSULES AT BEDTIME, Disp: , Rfl:     hydroCHLOROthiazide 12.5 MG oral, Take 1 each by mouth Daily., Disp: 30 tablet, Rfl: 12    HYDROcodone-acetaminophen (NORCO)  MG per tablet, , Disp: , Rfl:     lamoTRIgine (LaMICtal) 150 MG tablet, Take 2 tablets by mouth Every Night., Disp: , Rfl:     metoprolol tartrate (LOPRESSOR) 25 MG tablet, TAKE 1/2 TABLET BY MOUTH TWICE A DAY, Disp: 90 tablet, Rfl: 1    montelukast (SINGULAIR) 10 MG tablet, Take 1 tablet by mouth Every Night., Disp: , Rfl:     nitroglycerin (NITROSTAT) 0.4 MG SL tablet, 1 under the tongue as needed for angina, may repeat q5mins for up three doses, Disp: 25 tablet, Rfl: 3    omeprazole (priLOSEC) 40 MG capsule, Take 1 capsule by mouth 2 (Two) Times a Day., Disp: , Rfl:     Ventolin  (90 Base) MCG/ACT inhaler, , Disp: , Rfl:      Assessment:        Patient Active Problem List   Diagnosis    Essential hypertension    Class 1 obesity due to excess calories without serious comorbidity with body mass index (BMI) of 30.0 to 30.9 in adult    Mixed hyperlipidemia    Abnormal cardiac function test    Coronary artery disease involving native coronary artery of native heart without angina pectoris    Abnormal electrocardiogram    Preoperative examination, unspecified    Primary osteoarthritis of right knee    Primary osteoarthritis of left knee               Plan:   Cardiac clearance for cervical spine surgery:     has been seen and examined with no clinical signs of angina or CHF , patient is cleared for cervical spine surgery with non-modifiable risk factors. Patient has been advised to take cardiac meds with sip of water on the day of surgery.    Please use beta blocker for tachycardia preoperatively. Anticoagulation to be managed appropriately    Watch for chest pain, shortness of breath, palpitations, arrhythmias, and significant  change in the blood pressure preoperatively. Please check EKG pre-op and postop if any questions, notify us if any change in patient's cardiovascular conditions.    Coronary artery disease: no chest pain, stress test is normal. Continue atorvastatin, metoprolol    Risk reduction for the coronary artery disease, controlling the blood pressure, blood sugar management, cholesterol management, exercise, stress management, and proper compliance with medications and follow-up has been discussed    Hypertension: /79, controlled, continue hctz, lopressor    Importance of controlling hypertension and blood pressure  checkup on the regular basis has been explained. Hypertension as a silent killer has been discussed. Risk reduction of the weight and regular exercises to control the hypertension has been explained.    Hyperlipidemia: he reports his pcp manages his cholesterol and labs, continue atorvastatin    Risk of the hyperlipidemia, importance of the treatment has been explained. Pros and cons of the statins has been explained. Regular blood workup as well as side effects including the liver failure, myelopathy death has been explained.                   COUNSELING: done    Armando Dixon Jr.Counseling was given to patient for the following topics: diagnostic results, risk factor reductions, impressions, risks and benefits of treatment options and importance of treatment compliance .       SMOKING COUNSELING: denies    Clear for surgery follow up in 6mos or sooner if needed    Sincerely,   EDU Mercado  [facility]  03/07/25  10:02 EST    EMR Dragon/Transcription disclaimer:   Much of this encounter note is an electronic transcription/translation of spoken language to printed text. The electronic translation of spoken language may permit erroneous, or at times, nonsensical words or phrases to be inadvertently transcribed; Although I have reviewed the note for such errors, some may still  exist.

## 2025-03-07 ENCOUNTER — OFFICE VISIT (OUTPATIENT)
Dept: CARDIOLOGY | Facility: CLINIC | Age: 70
End: 2025-03-07
Payer: MEDICARE

## 2025-03-07 DIAGNOSIS — I25.10 CORONARY ARTERY DISEASE INVOLVING NATIVE CORONARY ARTERY OF NATIVE HEART WITHOUT ANGINA PECTORIS: ICD-10-CM

## 2025-03-07 DIAGNOSIS — Z01.810 PRE-OPERATIVE CARDIOVASCULAR EXAMINATION: Primary | ICD-10-CM

## 2025-03-07 DIAGNOSIS — I10 ESSENTIAL HYPERTENSION: ICD-10-CM

## 2025-03-07 DIAGNOSIS — E78.2 MIXED HYPERLIPIDEMIA: ICD-10-CM

## 2025-03-07 PROCEDURE — 99214 OFFICE O/P EST MOD 30 MIN: CPT

## 2025-03-07 PROCEDURE — 3078F DIAST BP <80 MM HG: CPT

## 2025-03-07 PROCEDURE — 3074F SYST BP LT 130 MM HG: CPT

## 2025-08-26 RX ORDER — METOPROLOL TARTRATE 25 MG/1
12.5 TABLET, FILM COATED ORAL EVERY 12 HOURS SCHEDULED
Qty: 90 TABLET | Refills: 1 | Status: SHIPPED | OUTPATIENT
Start: 2025-08-26

## (undated) DEVICE — UNDERCAST PADDING: Brand: DEROYAL

## (undated) DEVICE — DUAL CUT SAGITTAL BLADE

## (undated) DEVICE — SYRINGE, LUER LOCK, 60ML: Brand: MEDLINE

## (undated) DEVICE — BNDG ELAS ELITE V/CLOSE 6IN 5YD LF STRL

## (undated) DEVICE — ANTIBACTERIAL UNDYED BRAIDED (POLYGLACTIN 910), SYNTHETIC ABSORBABLE SUTURE: Brand: COATED VICRYL

## (undated) DEVICE — CONTAINER,SPECIMEN,OR STERILE,4OZ: Brand: MEDLINE

## (undated) DEVICE — PK KN TOTL 40

## (undated) DEVICE — GW EMR FIX EXCHG J STD .035 3MM 260CM

## (undated) DEVICE — DEV INDEFLATOR

## (undated) DEVICE — SPNG GZ WOVN 4X4IN 12PLY 10/BX STRL

## (undated) DEVICE — DRESSING,GAUZE,XEROFORM,CURAD,1"X8",ST: Brand: CURAD

## (undated) DEVICE — PENCL ES MEGADINE EZ/CLEAN BUTN W/HOLSTR 10FT

## (undated) DEVICE — CATH DIAG IMPULSE FL3.5 5F 100CM

## (undated) DEVICE — NEEDLE, QUINCKE, 20GX3.5": Brand: MEDLINE

## (undated) DEVICE — TREK CORONARY DILATATION CATHETER 2.50 MM X 12 MM / RAPID-EXCHANGE: Brand: TREK

## (undated) DEVICE — PAD,ABDOMINAL,8"X10",ST,LF: Brand: MEDLINE

## (undated) DEVICE — KT DRN EVAC WND PVC PCH WTROC RND 10F400

## (undated) DEVICE — APPL CHLORAPREP HI/LITE 26ML ORNG

## (undated) DEVICE — STPLR SKIN VISISTAT WD 35CT

## (undated) DEVICE — PATIENT RETURN ELECTRODE, SINGLE-USE, CONTACT QUALITY MONITORING, ADULT, WITH 9FT CORD, FOR PATIENTS WEIGING OVER 33LBS. (15KG): Brand: MEGADYNE

## (undated) DEVICE — DECANTER BAG 9": Brand: MEDLINE INDUSTRIES, INC.

## (undated) DEVICE — BNDG,ELSTC,MATRIX,STRL,4"X5YD,LF,HOOK&LP: Brand: MEDLINE

## (undated) DEVICE — BNDG ELAS ELITE V/CLOSE 4IN 5YD LF STRL

## (undated) DEVICE — KT MANIFLD CARDIAC

## (undated) DEVICE — DRAPE,REIN 53X77,STERILE: Brand: MEDLINE

## (undated) DEVICE — 6F .070 XB 3 100CM: Brand: VISTA BRITE TIP

## (undated) DEVICE — GLV SURG BIOGEL LTX PF 7 1/2

## (undated) DEVICE — CATH VENT MIV RADL PIG ST TIP 4F 110CM

## (undated) DEVICE — CATH DIAG IMPULSE FR4 5F 100CM

## (undated) DEVICE — RUNTHROUGH NS EXTRA FLOPPY PTCA GUIDEWIRE: Brand: RUNTHROUGH

## (undated) DEVICE — DRSNG GZ PETROLTM XEROFORM CURAD 1X8IN STRL

## (undated) DEVICE — GLIDESHEATH SLENDER STAINLESS STEEL KIT: Brand: GLIDESHEATH SLENDER

## (undated) DEVICE — THE STERILE LIGHT HANDLE COVER IS USED WITH STERIS SURGICAL LIGHTING AND VISUALIZATION SYSTEMS.

## (undated) DEVICE — GLV SURG PREMIERPRO ORTHO LTX PF SZ7.5 BRN

## (undated) DEVICE — GUIDELINER CATHETERS ARE INTENDED TO BE USED IN CONJUNCTION WITH GUIDE CATHETERS TO ACCESS DISCRETE REGIONS OF THE CORONARY AND/OR PERIPHERAL VASCULATURE, AND TO FACILITATE PLACEMENT OF INTERVENTIONAL DEVICES.: Brand: GUIDELINER® V3 CATHETER

## (undated) DEVICE — HI-TORQUE WHISPER MS GUIDE WIRE .014 STRAIGHT TIP 3.0 CM X 190 CM: Brand: HI-TORQUE WHISPER

## (undated) DEVICE — PK CATH CARD 40

## (undated) DEVICE — BNDG,ELSTC,MATRIX,STRL,6"X5YD,LF,HOOK&LP: Brand: MEDLINE

## (undated) DEVICE — TR BAND RADIAL ARTERY COMPRESSION DEVICE: Brand: TR BAND